# Patient Record
Sex: MALE | Race: WHITE | NOT HISPANIC OR LATINO | Employment: FULL TIME | URBAN - METROPOLITAN AREA
[De-identification: names, ages, dates, MRNs, and addresses within clinical notes are randomized per-mention and may not be internally consistent; named-entity substitution may affect disease eponyms.]

---

## 2018-04-27 ENCOUNTER — HOSPITAL ENCOUNTER (EMERGENCY)
Facility: HOSPITAL | Age: 53
Discharge: HOME | End: 2018-04-27
Attending: EMERGENCY MEDICINE
Payer: COMMERCIAL

## 2018-04-27 ENCOUNTER — APPOINTMENT (EMERGENCY)
Dept: RADIOLOGY | Facility: HOSPITAL | Age: 53
End: 2018-04-27
Payer: COMMERCIAL

## 2018-04-27 VITALS
RESPIRATION RATE: 16 BRPM | DIASTOLIC BLOOD PRESSURE: 96 MMHG | TEMPERATURE: 98.6 F | OXYGEN SATURATION: 99 % | WEIGHT: 226 LBS | SYSTOLIC BLOOD PRESSURE: 169 MMHG | BODY MASS INDEX: 30.65 KG/M2 | HEART RATE: 78 BPM

## 2018-04-27 DIAGNOSIS — M25.519 SHOULDER PAIN: Primary | ICD-10-CM

## 2018-04-27 DIAGNOSIS — S42.151A CLOSED FRACTURE OF GLENOID CAVITY AND NECK OF RIGHT SCAPULA, INITIAL ENCOUNTER: ICD-10-CM

## 2018-04-27 DIAGNOSIS — S42.141A CLOSED FRACTURE OF GLENOID CAVITY AND NECK OF RIGHT SCAPULA, INITIAL ENCOUNTER: ICD-10-CM

## 2018-04-27 PROCEDURE — 73030 X-RAY EXAM OF SHOULDER: CPT

## 2018-04-27 PROCEDURE — 99283 EMERGENCY DEPT VISIT LOW MDM: CPT

## 2018-04-27 RX ORDER — IBUPROFEN 400 MG/1
400 TABLET ORAL ONCE
Status: COMPLETED | OUTPATIENT
Start: 2018-04-27 | End: 2018-04-27

## 2018-04-27 RX ORDER — OXYCODONE HYDROCHLORIDE AND ACETAMINOPHEN 5; 325 MG/1; MG/1
1 TABLET ORAL EVERY 6 HOURS PRN
Qty: 15 TABLET | Refills: 0 | Status: SHIPPED | OUTPATIENT
Start: 2018-04-27 | End: 2018-05-02

## 2018-04-27 RX ORDER — IBUPROFEN 600 MG/1
600 TABLET ORAL EVERY 6 HOURS PRN
Qty: 28 TABLET | Refills: 0 | Status: SHIPPED | OUTPATIENT
Start: 2018-04-27 | End: 2019-11-15

## 2018-04-27 RX ORDER — OXYCODONE HYDROCHLORIDE AND ACETAMINOPHEN 5; 325 MG/1; MG/1
1 TABLET ORAL ONCE
Status: COMPLETED | OUTPATIENT
Start: 2018-04-27 | End: 2018-04-27

## 2018-04-27 RX ADMIN — IBUPROFEN 400 MG: 400 TABLET ORAL at 21:16

## 2018-04-27 RX ADMIN — OXYCODONE HYDROCHLORIDE AND ACETAMINOPHEN 1 TABLET: 5; 325 TABLET ORAL at 22:17

## 2018-04-28 NOTE — ED PROVIDER NOTES
History  Chief Complaint   Patient presents with    Shoulder Pain     pt c/o shoulder pain increasing over the past 2 weeks  has known torn rotator cuff for years  History provided by:  Patient   used: No    Shoulder Pain   Associated symptoms: no back pain and no fever      Patient is a 26-year-old male presenting to emergency department right shoulder pain  Chronic pain but worse since yesterday  Initially had bruising worsening pain 3 weeks ago  Bruising resolved  Yesterday was doing physical activity when pain got worse  No fevers  No swelling  No redness  No new falls  MDM x-ray concerning for age indeterminate glenoid fracture  Pain medications, sling, Ortho follow-up          None       Past Medical History:   Diagnosis Date    Fracture     right leg    Rotator cuff tear     bilateral       Past Surgical History:   Procedure Laterality Date    HIP ARTHROPLASTY Right     KNEE ARTHROSCOPY Bilateral     CT COLONOSCOPY FLX DX W/COLLJ SPEC WHEN PFRMD N/A 7/26/2016    Procedure: COLONOSCOPY;  Surgeon: Claudia Gross MD;  [facility]: [facility]; Service: Gastroenterology    CT WRIST Lori Stalling LIG Right 12/30/2016    Procedure: ENDOSCOPIC CARPAL TUNNEL RELEASE ;  Surgeon: Tracy Lorenzo MD;  Location: [facility];  Service: Orthopedics       History reviewed  No pertinent family history  I have reviewed and agree with the history as documented  Social History   Substance Use Topics    Smoking status: Never Smoker    Smokeless tobacco: Never Used    Alcohol use Yes      Comment: social        Review of Systems   Constitutional: Negative for chills, diaphoresis and fever  Respiratory: Negative for cough, shortness of breath, wheezing and stridor  Cardiovascular: Negative for chest pain, palpitations and leg swelling  Gastrointestinal: Negative for abdominal pain, blood in stool, diarrhea, nausea and vomiting     Genitourinary: Negative for dysuria, frequency and urgency  Musculoskeletal: Negative for back pain and neck stiffness  Skin: Negative for pallor and rash  Neurological: Negative for dizziness, syncope, weakness, light-headedness and headaches  All other systems reviewed and are negative  Physical Exam  ED Triage Vitals   Temperature Pulse Respirations Blood Pressure SpO2   04/27/18 2028 04/27/18 2026 04/27/18 2026 04/27/18 2026 04/27/18 2026   98 6 °F (37 °C) 78 16 169/96 99 %      Temp Source Heart Rate Source Patient Position - Orthostatic VS BP Location FiO2 (%)   04/27/18 2028 -- -- -- --   Oral          Pain Score       04/27/18 2026       8           Orthostatic Vital Signs  Vitals:    04/27/18 2026   BP: 169/96   Pulse: 78       Physical Exam   Constitutional: He appears well-developed and well-nourished  No distress  HENT:   Head: Normocephalic  Eyes: Pupils are equal, round, and reactive to light  Cardiovascular: Normal rate  Pulmonary/Chest: Effort normal    Musculoskeletal: He exhibits no tenderness or deformity  Decreased range of motion of the right shoulder due to pain, neurovascular intact distally, no tenderness, no deformity   Neurological: He is alert  No sensory deficit  He exhibits normal muscle tone  Coordination normal    Skin: He is not diaphoretic  ED Medications  Medications   ibuprofen (MOTRIN) tablet 400 mg (400 mg Oral Given 4/27/18 2116)   oxyCODONE-acetaminophen (PERCOCET) 5-325 mg per tablet 1 tablet (1 tablet Oral Given 4/27/18 2217)       Diagnostic Studies  Results Reviewed     None                 XR shoulder 2+ views RIGHT   Final Result by Merlinda Bank, DO (04/27 2158)      Lucency through the glenoid which may represent a age indeterminate fracture    Clinical correlation is recommended            Workstation performed: [id number]                    Procedures  Procedures       Phone Contacts  ED Phone Contact    ED Course                               ABI Means Time    Disposition  Final diagnoses:   Shoulder pain   Closed fracture of glenoid cavity and neck of right scapula, initial encounter     Time reflects when diagnosis was documented in both MDM as applicable and the Disposition within this note     Time User Action Codes Description Comment    4/27/2018 10:18 PM Benita Gant Add [M25 519] Shoulder pain     4/27/2018 10:18 PM Benita Gant Add [J16 168Q,  S42 151A] Closed fracture of glenoid cavity and neck of right scapula, initial encounter       ED Disposition     ED Disposition Condition Comment    Discharge  Denae Fitzgerald discharge to home/self care  Condition at discharge: Good        Follow-up Information     Follow up With Specialties Details Why Contact Info Additional [address] Emergency Department Emergency Medicine  As needed, If symptoms worsen Howie Mensah  [facility], [address], [facility] [place], [place], [address], DO Family Medicine  As needed [address] [facility] [place] [facility] Schedule an appointment as soon as possible for a visit  Edson [address] [place] [phone]  [phone]         Discharge Medication List as of 4/27/2018 10:20 PM      START taking these medications    Details   ibuprofen (MOTRIN) 600 mg tablet Take 1 tablet (600 mg total) by mouth every 6 (six) hours as needed for mild pain for up to 7 days, Starting Fri 4/27/2018, Until Fri 5/4/2018, Print      oxyCODONE-acetaminophen (PERCOCET) 5-325 mg per tablet Take 1 tablet by mouth every 6 (six) hours as needed for moderate pain for up to 5 days Max Daily Amount: 4 tablets, Starting Fri 4/27/2018, Until Wed 5/2/2018, Print           No discharge procedures on file      ED Provider  Electronically Signed by           Sony Hu MD  04/27/18 5463

## 2018-04-28 NOTE — DISCHARGE INSTRUCTIONS
Rotator Cuff Injury   WHAT YOU NEED TO KNOW:   What is a rotator cuff injury? A rotator cuff injury is damage to the muscles or tendons of your rotator cuff  A tendon is a cord of tough tissue that connects your muscles to your bones  The rotator cuff is made up of a group of muscles and tendons that hold the shoulder joint in place  The damage may include stretching of your muscle or tears in the tendons  It may also include inflammation of the bursa (small sack of fluid around the joint)  What causes a rotator cuff injury? · Wear and tear of the tendons: Your tendons get weaker as you get older  · Impingement: This happens when the bone of your upper arm presses on the tendon of your shoulder when you lift your arm  Impingement causes pain and inflammation that weaken your rotator cuff  · Overuse or injury:  Heavy lifting, throwing, or overuse may damage the rotator cuff  Sports such as baseball and tennis may injure your rotator cuff when your arm goes over your head  A fall or other shoulder injury may also damage your rotator cuff  What are the signs and symptoms of a rotator cuff injury? · Pain or stiffness: You may feel pain in your shoulder that travels down your arm  You may feel pain all of the time or only sometimes  You may feel pain when you lie on the side of your injured shoulder  · Decreased range of motion:  You may have trouble lifting your arm or placing it behind your back  It may also be hard to use your shoulder  If you have a severe injury, you may not be able to move your shoulder at all  · Tenderness or swelling: Your shoulder area may swell and be painful to the touch  · Numbness: You may lose feeling in part or all of your arm  This is more common in athletes who throw as part of their sport, such as baseball pitchers  · A popping noise: You may hear this noise and feel pain when you lift your arm  How is a rotator cuff injury diagnosed?   Your healthcare provider will ask if you have had an injury or surgery on your shoulder  He will examine your shoulder, and test the strength and movement of your arm  You may need other tests to show what is causing your symptoms  · Ultrasound: An ultrasound uses sound waves to show pictures on a monitor  An ultrasound may be done to show fluid or swelling around your rotator cuff  · X-ray:  An x-ray may show injury to the bones and tissues in your shoulder  · CT scan: This test is also called a CAT scan  An x-ray machine uses a computer to take pictures of your shoulder  You may be given a dye before the pictures are taken to help healthcare providers see the pictures better  Tell the healthcare provider if you have ever had an allergic reaction to contrast dye  · MRI:  This scan uses powerful magnets and a computer to take pictures of your shoulder  An MRI may show damage to your muscles or tendons  You may be given dye to help the pictures show up better  Tell the healthcare provider if you have ever had an allergic reaction to contrast dye  Do not enter the MRI room with any metal  Metal can cause serious injury  Tell the healthcare provider if you have any metal in or on your body  How is a rotator cuff injury treated? · Medicines:      ¨ Acetaminophen: This medicine decreases pain  Acetaminophen is available without a doctor's order  Ask how much to take and how often to take it  Follow directions  Acetaminophen can cause liver damage if not taken correctly  ¨ NSAIDs:  These medicines decrease swelling, pain, and fever  NSAIDs are available without a doctor's order  Ask your healthcare provider which medicine is right for you  Ask how much to take and when to take it  Take as directed  NSAIDs can cause stomach bleeding or kidney problems if not taken correctly  ¨ Pain medicine: You may be given a prescription medicine to decrease pain   Do not wait until the pain is severe before you take this medicine  ¨ Steroids: This medicine may be injected into the rotator cuff area to decrease inflammation and pain  · Physical therapy:  A physical therapist can teach you exercises to help improve movement and strength, and to decrease pain  These exercises may help you go back to your usual activities or return to playing sports  · Surgery: You may need surgery if your injury is severe or your symptoms do not improve  You may also need surgery to decrease your signs and symptoms if other treatments have not worked  ¨ Debridement and repair: This surgery is done for partial or full tears of your rotator cuff  Your healthcare provider will clean away damaged tissue and fix your tear  ¨ Tendon transfer and graft: This surgery is done for badly torn rotator cuffs that cannot be repaired easily  Your healthcare provider will use a piece of another tissue or muscle to fix the torn tendon  ¨ Arthrodesis: This surgery is to reshape the bone of your shoulder joint so it stays in place  This is done if the muscles of your rotator cuff are not working  ¨ Arthroplasty:  During this surgery, an artificial joint made of metal and plastic is put into your shoulder  This surgery may be done if the rotator cuff cannot be fixed, or if your pain and swelling do not go away  How can I care for my rotator cuff injury at home? · Rest:  Rest may help your shoulder heal  Overuse of your shoulder can make your injury worse  Avoid heavy lifting, using your arms over your head, or any other activity that makes the pain worse  · Put ice or heat on your shoulder:  Use ice on your shoulder every few hours for the first several days  This may help decrease pain and swelling  After the first several days, a heating pad may help relax the muscles in your shoulder  When should I contact my healthcare provider?    · The pain in your shoulder or arm is not improving, or is worse than before you started treatment  · You have new pain in your neck  · You have a fever  · You have questions or concerns about your condition or care  When should I seek immediate care or call 911? · You suddenly cannot move your arm  · You have severe stomach or back pain, are vomiting blood, or have black bowel movements  CARE AGREEMENT:   You have the right to help plan your care  Learn about your health condition and how it may be treated  Discuss treatment options with your caregivers to decide what care you want to receive  You always have the right to refuse treatment  The above information is an  only  It is not intended as medical advice for individual conditions or treatments  Talk to your doctor, nurse or pharmacist before following any medical regimen to see if it is safe and effective for you  © 2017 [employer]  Information is for End User's use only and may not be sold, redistributed or otherwise used for commercial purposes  All illustrations and images included in CareNotes® are the copyrighted property of A [employer]  or [employer]

## 2018-06-07 ENCOUNTER — OFFICE VISIT (OUTPATIENT)
Dept: OBGYN CLINIC | Facility: OTHER | Age: 53
End: 2018-06-07
Payer: COMMERCIAL

## 2018-06-07 VITALS
BODY MASS INDEX: 30.35 KG/M2 | SYSTOLIC BLOOD PRESSURE: 146 MMHG | WEIGHT: 223.8 LBS | DIASTOLIC BLOOD PRESSURE: 91 MMHG | HEART RATE: 71 BPM

## 2018-06-07 DIAGNOSIS — M25.511 ACUTE PAIN OF RIGHT SHOULDER: Primary | ICD-10-CM

## 2018-06-07 PROCEDURE — 99214 OFFICE O/P EST MOD 30 MIN: CPT | Performed by: ORTHOPAEDIC SURGERY

## 2018-06-07 NOTE — PROGRESS NOTES
Rehan Ngo MD personally examined the patient and reviewed the history provided  I agree with the note and the assessment and plan by Cesia Cleveland PA-C  Briefly the patient is a 47 y o  male with a chief complaint of right shoulder pain who presents to the office for evaluation and treatment  Please refer to the HPI documented by the PA in the body of the note for further details  I had cared for the patient years ago for a full-thickness rotator cuff tear and has been doing well until his recent falls and recurrent injury    Physical Exam: Blood pressure 146/91, pulse 71, weight 102 kg (223 lb 12 8 oz)  Right shoulder positive drop arm difficult with overhead lifting and weakness with abduction external rotation strength testing    Radiology: I have personally reviewed the following images and my interpretation follows  MRI right shoulder from 2010 shows a full-thickness supraspinatus tear    X-rays from April 2018 show no proximal migration and minimal degenerative change, questionable calcification of the posterior superior labrum    Assessment:    Right shoulder recurrent symptoms with known full-thickness supraspinatus tear    Plan:    Given the amount of time since the injury and the significant concern for progression of his rotator cuff tear and new MRI is indicated to evaluate for the structure of the shoulder and determine what options are available for treatment                  Assessment  Diagnoses and all orders for this visit:    Acute pain of right shoulder  -     MRI shoulder right wo contrast; Future        Discussion and Plan:    1  MRI right shoulder to evaluate rotator cuff, evaluate for loose bodies and to better define the x-ray suspected glenoid fracture  2  Tylenol as needed  3  Follow up after MRI to discuss treatment options  Ed getting  in September, so if there is something surgical then he would like to wait until after the wedding      Subjective: Patient ID: Elena Liang is a 47 y o  male      Ed returns to the office with worsening right shoulder pain  He has a past history positive for a right shoulder rotator cuff tear - 8-9 years ago  He admits to 2 significant falls since he was seen in 2010  His pain recently worsened 2 months ago when painting for extended period of time overhead-Arm got stuck and he had to forcibly move the arm  He noticed significant bruising the next day  A few days later he was carrying a box when the pain in the right shoulder worsened to the point where he had to go to the ER  ER took x-rays and told him he possibly had a glenoid fracture and to follow up with [facility] (this was in April)  Since the ER visit, he has noted an overall improvement in the right shoulder pain but has difficulty elevating the right shoulder over head  He denies paresthesias  The following portions of the patient's history were reviewed and updated as appropriate: allergies, current medications, past family history, past medical history, past social history, past surgical history and problem list     Review of Systems   Constitutional: Negative for chills and fever  HENT: Negative for hearing loss  Eyes: Negative for visual disturbance  Respiratory: Negative for shortness of breath  Cardiovascular: Negative for chest pain  Gastrointestinal: Negative for abdominal pain  Musculoskeletal:        As reviewed in the HPI   Skin: Negative for rash  Neurological:        As reviewed in the HPI   Psychiatric/Behavioral: Negative for agitation  Objective:  Right Shoulder Exam     Tenderness   The patient is experiencing tenderness in the trapezius      Range of Motion   Active Abduction:                       130  Forward Flexion:                        170  External Rotation:                      60  Internal Rotation 0 degrees:      Lumbar    Muscle Strength   External Rotation: 5/5  Supraspinatus:     4/5    Tests Impingement:   Negative  Rubio:          Positive  Cross Arm:      Negative  Drop Arm:        Positive    Comments:  +empty Can  +O'Briens  Neg speeds          Physical Exam   Constitutional: He is oriented to person, place, and time  He appears well-developed and well-nourished  HENT:   Head: Normocephalic  Eyes: EOM are normal    Neck: Normal range of motion  Pulmonary/Chest: No respiratory distress  He has no wheezes  Neurological: He is alert and oriented to person, place, and time  Skin: Skin is warm and dry  Psychiatric: He has a normal mood and affect  His behavior is normal  Judgment and thought content normal          I have personally reviewed pertinent films in PACS and my interpretation is as follows      3 views right shoulder: No dislocation; possible glenoid fracture as mentioned by radiologist

## 2018-06-14 ENCOUNTER — HOSPITAL ENCOUNTER (OUTPATIENT)
Dept: RADIOLOGY | Age: 53
Discharge: HOME | End: 2018-06-14
Payer: COMMERCIAL

## 2018-06-14 DIAGNOSIS — M25.511 ACUTE PAIN OF RIGHT SHOULDER: ICD-10-CM

## 2018-06-14 PROCEDURE — 73221 MRI JOINT UPR EXTREM W/O DYE: CPT

## 2018-07-03 ENCOUNTER — OFFICE VISIT (OUTPATIENT)
Dept: OBGYN CLINIC | Facility: OTHER | Age: 53
End: 2018-07-03
Payer: COMMERCIAL

## 2018-07-03 VITALS
SYSTOLIC BLOOD PRESSURE: 136 MMHG | BODY MASS INDEX: 30.22 KG/M2 | DIASTOLIC BLOOD PRESSURE: 90 MMHG | HEART RATE: 83 BPM | WEIGHT: 222.8 LBS

## 2018-07-03 DIAGNOSIS — IMO0001 SUPRASPINATUS TENDON TEAR, RIGHT, SUBSEQUENT ENCOUNTER: Primary | ICD-10-CM

## 2018-07-03 PROCEDURE — 99214 OFFICE O/P EST MOD 30 MIN: CPT | Performed by: ORTHOPAEDIC SURGERY

## 2018-07-03 PROCEDURE — 20610 DRAIN/INJ JOINT/BURSA W/O US: CPT | Performed by: ORTHOPAEDIC SURGERY

## 2018-07-03 RX ORDER — BUPIVACAINE HYDROCHLORIDE 2.5 MG/ML
2 INJECTION, SOLUTION INFILTRATION; PERINEURAL
Status: COMPLETED | OUTPATIENT
Start: 2018-07-03 | End: 2018-07-03

## 2018-07-03 RX ORDER — BETAMETHASONE SODIUM PHOSPHATE AND BETAMETHASONE ACETATE 3; 3 MG/ML; MG/ML
6 INJECTION, SUSPENSION INTRA-ARTICULAR; INTRALESIONAL; INTRAMUSCULAR; SOFT TISSUE
Status: COMPLETED | OUTPATIENT
Start: 2018-07-03 | End: 2018-07-03

## 2018-07-03 RX ADMIN — BUPIVACAINE HYDROCHLORIDE 2 ML: 2.5 INJECTION, SOLUTION INFILTRATION; PERINEURAL at 15:52

## 2018-07-03 RX ADMIN — BETAMETHASONE SODIUM PHOSPHATE AND BETAMETHASONE ACETATE 6 MG: 3; 3 INJECTION, SUSPENSION INTRA-ARTICULAR; INTRALESIONAL; INTRAMUSCULAR; SOFT TISSUE at 15:52

## 2018-07-03 NOTE — PROGRESS NOTES
Assessment  Diagnoses and all orders for this visit:    Supraspinatus tendon tear, right, subsequent encounter        Discussion and Plan:    The patient has progressed his tear to a large retracted supraspinatus tear with mild to moderate atrophy  Operative options including attempted repair with possible superior capsular reconstruction were reviewed and the patient is interested in this however he wishes to delay this until after his wedding and does not feel he can have surgery to November so we have provided him with a subacromial injection for symptomatic treatment and he will return to his home exercises  We will check his progress in October to possibly consider arthroscopic evaluation and either repair or reconstruction in November    Subjective:   Patient ID: Talon Farooq is a 47 y o  male      Patient presents for review of his right shoulder MRI scan  Had a known full-thickness tear 8 years ago which was treated successfully nonoperatively, he has had recurrent symptoms and cannot raise his arm overhead            The following portions of the patient's history were reviewed and updated as appropriate: allergies, current medications, past family history, past medical history, past social history, past surgical history and problem list     Review of Systems   Constitutional: Negative for chills and fever  HENT: Negative for hearing loss  Eyes: Negative for visual disturbance  Respiratory: Negative for shortness of breath  Cardiovascular: Negative for chest pain  Gastrointestinal: Negative for abdominal pain  Musculoskeletal:        As reviewed in the HPI   Skin: Negative for rash  Neurological:        As reviewed in the HPI   Psychiatric/Behavioral: Negative for agitation         Objective:  Right Shoulder Exam     Tenderness   None    Range of Motion   Active Abduction:                       100  Passive Abduction:                    100  Extension: Normal  Forward Flexion:                        120  External Rotation:                      30    Muscle Strength   Abduction:            4/5  Internal Rotation:  4/5  External Rotation: 4/5    Tests   Impingement:   Positive  Rubio:          Positive  Cross Arm:      Negative  Drop Arm:        Positive  Apprehension: Negative          Physical Exam   Constitutional: He is oriented to person, place, and time  He appears well-developed and well-nourished  HENT:   Head: Normocephalic and atraumatic  Neck: Normal range of motion  Neck supple  Cardiovascular: Normal rate and regular rhythm  Pulmonary/Chest: Effort normal  No respiratory distress  Abdominal: Soft  He exhibits no distension  Neurological: He is alert and oriented to person, place, and time  Skin: Skin is warm and dry  Psychiatric: He has a normal mood and affect  His behavior is normal    Nursing note and vitals reviewed  I have personally reviewed pertinent films in PACS and my interpretation is as follows      MRI right shoulder shows progression of his supraspinatus tear to a full-thickness retracted tear, the tendon is retracted to the glenoid and there is mild to moderate muscle atrophy with cystic changes of the humeral head    Plain x-rays right shoulder from April 2018 show no proximal humeral migration      Large joint arthrocentesis  Date/Time: 7/3/2018 3:52 PM  Consent given by: patient  Timeout: Immediately prior to procedure a time out was called to verify the correct patient, procedure, equipment, support staff and site/side marked as required   Supporting Documentation  Indications: pain   Procedure Details  Location: shoulder - R subacromial bursa  Preparation: Patient was prepped and draped in the usual sterile fashion  Needle size: 22 G  Ultrasound guidance: no  Approach: lateral  Medications administered: 2 mL bupivacaine 0 25 %; 6 mg betamethasone acetate-betamethasone sodium phosphate 6 (3-3) mg/mL    Patient tolerance: patient tolerated the procedure well with no immediate complications  Dressing:  Sterile dressing applied

## 2018-07-03 NOTE — PATIENT INSTRUCTIONS

## 2018-10-04 ENCOUNTER — OFFICE VISIT (OUTPATIENT)
Dept: OBGYN CLINIC | Facility: OTHER | Age: 53
End: 2018-10-04
Payer: COMMERCIAL

## 2018-10-04 VITALS
WEIGHT: 217.4 LBS | DIASTOLIC BLOOD PRESSURE: 91 MMHG | SYSTOLIC BLOOD PRESSURE: 132 MMHG | BODY MASS INDEX: 29.48 KG/M2 | HEART RATE: 76 BPM

## 2018-10-04 DIAGNOSIS — IMO0001 SUPRASPINATUS TENDON TEAR, RIGHT, SUBSEQUENT ENCOUNTER: Primary | ICD-10-CM

## 2018-10-04 PROCEDURE — 99214 OFFICE O/P EST MOD 30 MIN: CPT | Performed by: ORTHOPAEDIC SURGERY

## 2018-10-04 NOTE — PROGRESS NOTES
Assessment  Diagnoses and all orders for this visit:    Supraspinatus tendon tear, right, subsequent encounter      Discussion and Plan:    Plan for OR with Dr Adonis Suresh for Right Shoulder rotator cuff repair  Physical therapy and a sling will be arranged  Follow up after surgery    Subjective:   Patient ID: Kerry Woo is a 47 y o  male      Ed presents to the office with chief complaint of right shoulder pain  Right shoulder pain is worse with motion and lifting  He states the shoulder feels better since the injection in July  He denies new injury or trauma  He currently does not have pain that interferes with sleep  He is here to discuss surgical options for his massive rotator cuff tear  No numbness or tingling  The following portions of the patient's history were reviewed and updated as appropriate: allergies, current medications, past family history, past medical history, past social history, past surgical history and problem list     Review of Systems   Constitutional: Negative for chills and fever  HENT: Negative for hearing loss  Eyes: Negative for visual disturbance  Respiratory: Negative for shortness of breath  Cardiovascular: Negative for chest pain  Gastrointestinal: Negative for abdominal pain  Musculoskeletal:        As reviewed in the HPI   Skin: Negative for rash  Neurological:        As reviewed in the HPI   Psychiatric/Behavioral: Negative for agitation  Objective:  Right Shoulder Exam     Range of Motion   Normal right shoulder ROM    Muscle Strength   External Rotation: 4/5  Supraspinatus:     4/5    Tests   Impingement:   Positive  Rubio:          Positive  Drop Arm:        Positive    Comments:    Positive empty can  Positive O'Briens          Physical Exam   Constitutional: He is oriented to person, place, and time  He appears well-developed and well-nourished  HENT:   Head: Normocephalic  Eyes: EOM are normal    Neck: Normal range of motion  Pulmonary/Chest: No respiratory distress  He has no wheezes  Neurological: He is alert and oriented to person, place, and time  Skin: Skin is warm and dry  Psychiatric: He has a normal mood and affect  His behavior is normal  Judgment and thought content normal        I Etelvina Garcia MD personally examined the patient and reviewed the history provided  I agree with the note and the assessment and plan by Ros Mensah PA-C  Briefly the patient is a 47 y o  male who presents to the office for evaluation and treatment  Please refer to the HPI documented by the PA in the body of the note for further details  Patient has a known full-thickness supraspinatus tear which is somewhat chronic in nature    Physical Exam: Blood pressure 132/91, pulse 76, weight 98 6 kg (217 lb 6 4 oz)  Right shoulder full range of motion with pain and weakness abduction testing    Radiology: I have personally reviewed the following images and my interpretation follows  MRI right shoulder shows a full-thickness complete supraspinatus tear with retraction the glenohumeral joint  Only mild muscle atrophy is seen    Assessment:    Right full-thickness supraspinatus tear    Plan:    I again reviewed with the patient that this is a chronic tear which should be attempted to be repaired to help preserve his function in the shoulder going forward and he does wish to proceed forward  He is aware that we may not be able to fully repair the tear if it is truly chronic but certainly an attempt should be made and he is interested in proceeding forward with scheduling  A thorough discussion was performed with the patient regarding the risks and benefit of operative and nonoperative treatment of their rotator cuff tear    Risks discussed include but were not limited to infection, neurovascular injury, recurrent tear, nonhealing of the repair, need for further surgery, need for biceps tenodesis or tenotomy, stiffness, need for prolonged rehabilitation, as well as the risk of anesthesia  After this discussion all questions were answered and informed consent was obtained in the office for arthroscopic rotator cuff repair of the right shoulder

## 2018-10-04 NOTE — PATIENT INSTRUCTIONS
You are being scheduled for a shoulder arthroscopy to treat your symptoms  Below are some instructions and information on what to expect before and after your surgery  Pre-Surgical Preparation for Arthroscopic Shoulder Surgery: You will be contacted the evening prior to your surgery to confirm the scheduled time of the procedure and when to arrive at the hospital    Do not eat or drink anything after midnight the night before your surgery  Since you are having out-patient surgery, make sure that you have someone who can drive you home later in the day  Also, prepare that person for a long day, as the process of safely preparing for and recovering from the procedure is more time consuming than the actual procedure! As you will be in a sling after surgery, please wear or bring a loose fitting button-down shirt so that you can easily place this over the sling when you leave the surgical suite  This avoids having to place the operative arm in a sleeve  Most patients find that this is the easiest outfit to wear for the first week or so after surgery so you may want to plan accordingly  Most patients find that lying down in bed after shoulder surgery accentuates their discomfort  This is likely related to the effect of gravity on the swelling in the shoulder  As a result, most patients sleep better in a recliner or in bed with pillows propped up behind their back for the first few days or weeks after surgery  It is a good idea to plan for this ahead of time so there will be less hassle getting things set up the night after surgery  What to Expect After Arthroscopic Shoulder Surgery: It is normal to have swelling and discomfort in the shoulder for several days or a week after surgery  It is also normal to have a small amount of drainage from the surgical wounds (especially the first few days after surgery), as we put fluid into the shoulder to visualize the structures during surgery  It is NOT normal to have foul smelling, purulent drainage and if this is noted, please contact the office immediately or proceed to the emergency room for evaluation as this may indicate an infection  Applying ice bags to the shoulder may help with pain that is not controlled by the pain medicine  Ice should be applied 20-30 minutes at a time, every hour or two  Make sure to put a thin towel or T-shirt next to your skin to avoid direct contact of the ice with the skin  Icing is most helpful in the first 48 hours, although many people find that continuing past this time frame lessens their postoperative pain  Please note that your post-operative dressing is not conductive to ice, so if you need to, it is okay to remove that dressing even the night after surgery and place band-aids over the wounds in order for the ice to take effect  Pain Control    Most patients will receive a nerve block, the local anesthetic may keep your whole arm numb for several hours (12-24 in most cases)  When the block is beginning to wear off, you will first feel a pins and needles sensation in your hand  This is a warning that you may start experiencing more pain, so please take a pain pill if you have not already  You will be given a prescription for pain medication when you are discharged from the hospital  If you find you do not tolerate that type of pain medicine well, call our office and we will try another one  The anesthesiologists have also been providing most patients with a catheter that is left in place after the block  The catheter is connected to a small pump which will continue to provide numbing medicine and help prolong the pain control from the block  Unfortunately this catheter is not as effective as the initial block, but can still be very helpful in managing the pain  Even with the block the pain can be significant and a narcotic pain medication is often required to manage the pain    A prescription for this will be provided but only a limited number of pills will be prescribed to help manage the acute phase of recovery  Outside of the acute phase (5 or so days), this medication will not be indicated  In addition to the narcotic pain medication, it is safe to use an anti-inflammatory (unless the patient has a medical condition that would not allow safe use of this mediation)  This includes the Advil, Motrin, Ibuprofen and Alleve category of medications  Simply follow the over the counter dosing on the package and use as indicated as another adjunct  Importantly since these medications are all very similar, use only one of them  Tylenol is a separate medication that can be utilized as well and can be taken at the same time as the other medication or given in a "staggered" manner  Just make sure that you follow the dosing on the over the counter bottle instructions  Also make sure that the pain medication prescribed by Dr Lisa Soto team does not contain acetaminophen (this is found in Percocet and Vicodin)  Typically we do not prescribe those types of pain medications but if for some reason that has been prescribed DO NOT add more Tylenol (acetaminophen) as you could end up taking too much of that medication  As mentioned above, most patients find that lying down accentuates their discomfort  You might sleep better in a recliner, or propped up in bed  Dr Neema Lam encourages patients to safely ambulate around the house as much as possible in the first few days after the procedure as this can help with blood circulation in the legs  While the incidence of blood clots is very rare following shoulder surgery, early ambulation is a great way to help decrease the already low rate  24-48 hours after the surgery you may remove the bandage and cover incisions with Band-Aids if needed   At that time you may shower, the wounds will have a surgical glue that will protect them from shower water but do not submerge your incisions directly (bathing or swimming) until at least 2 weeks post-operatively  Unless noted otherwise in your discharge paperwork, Dr Gamaliel Orourke uses absorbable sutures so they do not need to be removed  Dr Cheko Xie physician assistant (PA) will see you in the office a few days after the procedure to review the intra-operative findings and to initiate physical therapy if appropriate  A post-operative appointment should have been scheduled for you already, but if for some reason this did not happen, please call the office to make one  Physical therapy is important after nearly all shoulder surgeries and a detailed rehabilitation plan based on the specific intra-operative findings and procedures will be provided to your therapist at the first post-operative office visit  Most patients have post-operative therapy appointments scheduled pre-operatively, but if you do not, that will be handled at the first post-operative office visit  Unless expressly directed otherwise it is safe to remove the sling even the first day after the surgery and let the arm hang by the side  This allows patients to shower and even put a shirt on (bad arm in the sleeve first)  It is also safe to flex and extend their wrist, hand and fingers as much as possible when the block wears off  These simple motions can serve to pump fluid out of the forearm and decrease swelling in the arm

## 2018-10-18 NOTE — PRE-PROCEDURE INSTRUCTIONS
Pre-Surgery Instructions:   Medication Instructions    ibuprofen (MOTRIN) 600 mg tablet Patient was instructed by Physician and understands  Pre op and bathing instructions reviewed  Pt will get hibiclens    Pt will bring Sling DOS

## 2018-10-19 ENCOUNTER — ANESTHESIA EVENT (OUTPATIENT)
Dept: PERIOP | Facility: AMBULARY SURGERY CENTER | Age: 53
End: 2018-10-19
Payer: COMMERCIAL

## 2018-11-02 ENCOUNTER — ANESTHESIA (OUTPATIENT)
Dept: PERIOP | Facility: AMBULARY SURGERY CENTER | Age: 53
End: 2018-11-02
Payer: COMMERCIAL

## 2018-11-02 ENCOUNTER — HOSPITAL ENCOUNTER (OUTPATIENT)
Facility: AMBULARY SURGERY CENTER | Age: 53
Setting detail: OUTPATIENT SURGERY
Discharge: HOME | End: 2018-11-02
Attending: ORTHOPAEDIC SURGERY | Admitting: ORTHOPAEDIC SURGERY
Payer: COMMERCIAL

## 2018-11-02 VITALS
HEART RATE: 78 BPM | OXYGEN SATURATION: 98 % | HEIGHT: 72 IN | BODY MASS INDEX: 29.8 KG/M2 | DIASTOLIC BLOOD PRESSURE: 66 MMHG | RESPIRATION RATE: 18 BRPM | WEIGHT: 220 LBS | SYSTOLIC BLOOD PRESSURE: 122 MMHG | TEMPERATURE: 97.5 F

## 2018-11-02 DIAGNOSIS — IMO0001 SUPRASPINATUS TENDON TEAR, RIGHT, SUBSEQUENT ENCOUNTER: Primary | ICD-10-CM

## 2018-11-02 PROCEDURE — 29827 SHO ARTHRS SRG RT8TR CUF RPR: CPT | Performed by: PHYSICIAN ASSISTANT

## 2018-11-02 PROCEDURE — C1713 ANCHOR/SCREW BN/BN,TIS/BN: HCPCS | Performed by: ORTHOPAEDIC SURGERY

## 2018-11-02 PROCEDURE — C9290 INJ, BUPIVACAINE LIPOSOME: HCPCS | Performed by: STUDENT IN AN ORGANIZED HEALTH CARE EDUCATION/TRAINING PROGRAM

## 2018-11-02 PROCEDURE — 29823 SHO ARTHRS SRG XTNSV DBRDMT: CPT | Performed by: PHYSICIAN ASSISTANT

## 2018-11-02 PROCEDURE — 29827 SHO ARTHRS SRG RT8TR CUF RPR: CPT | Performed by: ORTHOPAEDIC SURGERY

## 2018-11-02 PROCEDURE — 29823 SHO ARTHRS SRG XTNSV DBRDMT: CPT | Performed by: ORTHOPAEDIC SURGERY

## 2018-11-02 DEVICE — HEALIX ADVANCE KNOTLESS BR ANCHOR TCP/PLGA ABSORBABLE ANCHOR 5.5MM
Type: IMPLANTABLE DEVICE | Site: SHOULDER | Status: FUNCTIONAL
Brand: HEALIX ADVANCE

## 2018-11-02 DEVICE — DEPUY MITEK HEALIX ADVANCE 4.5 BR: Type: IMPLANTABLE DEVICE | Site: SHOULDER | Status: FUNCTIONAL

## 2018-11-02 RX ORDER — PROPOFOL 10 MG/ML
INJECTION, EMULSION INTRAVENOUS AS NEEDED
Status: DISCONTINUED | OUTPATIENT
Start: 2018-11-02 | End: 2018-11-02 | Stop reason: SURG

## 2018-11-02 RX ORDER — BUPIVACAINE HYDROCHLORIDE 5 MG/ML
INJECTION, SOLUTION PERINEURAL AS NEEDED
Status: DISCONTINUED | OUTPATIENT
Start: 2018-11-02 | End: 2018-11-02 | Stop reason: SURG

## 2018-11-02 RX ORDER — OXYCODONE HYDROCHLORIDE 5 MG/1
10 TABLET ORAL EVERY 4 HOURS PRN
Status: DISCONTINUED | OUTPATIENT
Start: 2018-11-02 | End: 2018-11-02 | Stop reason: HOSPADM

## 2018-11-02 RX ORDER — ACETAMINOPHEN 325 MG/1
650 TABLET ORAL EVERY 6 HOURS PRN
Status: DISCONTINUED | OUTPATIENT
Start: 2018-11-02 | End: 2018-11-02 | Stop reason: HOSPADM

## 2018-11-02 RX ORDER — ONDANSETRON 2 MG/ML
4 INJECTION INTRAMUSCULAR; INTRAVENOUS ONCE AS NEEDED
Status: DISCONTINUED | OUTPATIENT
Start: 2018-11-02 | End: 2018-11-02 | Stop reason: HOSPADM

## 2018-11-02 RX ORDER — HYDROMORPHONE HCL/PF 1 MG/ML
0.5 SYRINGE (ML) INJECTION
Status: DISCONTINUED | OUTPATIENT
Start: 2018-11-02 | End: 2018-11-02 | Stop reason: HOSPADM

## 2018-11-02 RX ORDER — OXYCODONE HYDROCHLORIDE 5 MG/1
TABLET ORAL
Qty: 26 TABLET | Refills: 0 | Status: SHIPPED | OUTPATIENT
Start: 2018-11-02 | End: 2019-11-15

## 2018-11-02 RX ORDER — ONDANSETRON 2 MG/ML
4 INJECTION INTRAMUSCULAR; INTRAVENOUS EVERY 6 HOURS PRN
Status: DISCONTINUED | OUTPATIENT
Start: 2018-11-02 | End: 2018-11-02 | Stop reason: HOSPADM

## 2018-11-02 RX ORDER — ACETAMINOPHEN 500 MG
1000 TABLET ORAL EVERY 6 HOURS PRN
COMMUNITY
End: 2019-11-15

## 2018-11-02 RX ORDER — OXYCODONE HYDROCHLORIDE 5 MG/1
5 TABLET ORAL EVERY 4 HOURS PRN
Status: DISCONTINUED | OUTPATIENT
Start: 2018-11-02 | End: 2018-11-02 | Stop reason: HOSPADM

## 2018-11-02 RX ORDER — MIDAZOLAM HYDROCHLORIDE 1 MG/ML
INJECTION INTRAMUSCULAR; INTRAVENOUS AS NEEDED
Status: DISCONTINUED | OUTPATIENT
Start: 2018-11-02 | End: 2018-11-02 | Stop reason: SURG

## 2018-11-02 RX ORDER — FENTANYL CITRATE/PF 50 MCG/ML
25 SYRINGE (ML) INJECTION
Status: DISCONTINUED | OUTPATIENT
Start: 2018-11-02 | End: 2018-11-02 | Stop reason: HOSPADM

## 2018-11-02 RX ORDER — ONDANSETRON 2 MG/ML
INJECTION INTRAMUSCULAR; INTRAVENOUS AS NEEDED
Status: DISCONTINUED | OUTPATIENT
Start: 2018-11-02 | End: 2018-11-02 | Stop reason: SURG

## 2018-11-02 RX ORDER — FENTANYL CITRATE 50 UG/ML
INJECTION, SOLUTION INTRAMUSCULAR; INTRAVENOUS AS NEEDED
Status: DISCONTINUED | OUTPATIENT
Start: 2018-11-02 | End: 2018-11-02 | Stop reason: SURG

## 2018-11-02 RX ORDER — SODIUM CHLORIDE 9 MG/ML
125 INJECTION, SOLUTION INTRAVENOUS CONTINUOUS
Status: DISCONTINUED | OUTPATIENT
Start: 2018-11-02 | End: 2018-11-02 | Stop reason: HOSPADM

## 2018-11-02 RX ORDER — LIDOCAINE HYDROCHLORIDE 10 MG/ML
INJECTION, SOLUTION INFILTRATION; PERINEURAL AS NEEDED
Status: DISCONTINUED | OUTPATIENT
Start: 2018-11-02 | End: 2018-11-02 | Stop reason: SURG

## 2018-11-02 RX ADMIN — SODIUM CHLORIDE: 0.9 INJECTION, SOLUTION INTRAVENOUS at 11:24

## 2018-11-02 RX ADMIN — CEFAZOLIN SODIUM 2000 MG: 2 SOLUTION INTRAVENOUS at 11:24

## 2018-11-02 RX ADMIN — ONDANSETRON 4 MG: 2 INJECTION INTRAMUSCULAR; INTRAVENOUS at 12:40

## 2018-11-02 RX ADMIN — DEXAMETHASONE SODIUM PHOSPHATE 5 MG: 10 INJECTION INTRAMUSCULAR; INTRAVENOUS at 11:37

## 2018-11-02 RX ADMIN — BUPIVACAINE 20 ML: 13.3 INJECTION, SUSPENSION, LIPOSOMAL INFILTRATION at 11:00

## 2018-11-02 RX ADMIN — BUPIVACAINE HYDROCHLORIDE 5 ML: 5 INJECTION, SOLUTION PERINEURAL at 11:00

## 2018-11-02 RX ADMIN — FENTANYL CITRATE 50 MCG: 50 INJECTION, SOLUTION INTRAMUSCULAR; INTRAVENOUS at 10:55

## 2018-11-02 RX ADMIN — LIDOCAINE HYDROCHLORIDE 50 MG: 10 INJECTION, SOLUTION INFILTRATION; PERINEURAL at 11:28

## 2018-11-02 RX ADMIN — MIDAZOLAM HYDROCHLORIDE 2 MG: 1 INJECTION, SOLUTION INTRAMUSCULAR; INTRAVENOUS at 10:55

## 2018-11-02 RX ADMIN — PROPOFOL 200 MG: 10 INJECTION, EMULSION INTRAVENOUS at 11:28

## 2018-11-02 NOTE — ANESTHESIA POSTPROCEDURE EVALUATION
Post-Op Assessment Note      CV Status:  Stable    Mental Status:  Alert and awake    Hydration Status:  Euvolemic    PONV Controlled:  Controlled    Airway Patency:  Patent    Post Op Vitals Reviewed: Yes          Staff: Anesthesiologist, CRNA           BP   130/81   Temp   97   Pulse  71   Resp   16   SpO2   98

## 2018-11-02 NOTE — ANESTHESIA PROCEDURE NOTES
Peripheral Block    Patient location during procedure: holding area  Start time: 11/2/2018 11:00 AM  Reason for block: at surgeon's request and post-op pain management  Staffing  Anesthesiologist: Jillian Arrieta  Performed: anesthesiologist   Preanesthetic Checklist  Completed: patient identified, site marked, surgical consent, pre-op evaluation, timeout performed, IV checked, risks and benefits discussed and monitors and equipment checked  Peripheral Block  Patient position: sitting  Prep: ChloraPrep  Patient monitoring: heart rate, continuous pulse ox, frequent blood pressure checks and cardiac monitor  Block type: interscalene  Laterality: right  Injection technique: single-shot  Procedures: ultrasound guided  Ultrasound permanent image saved  Local infiltration: lidocaine  Infiltration strength: 1 %  Dose: 1 5 mL  Needle  Needle type: Stimuplex   Needle gauge: 22 G  Needle length: 5 cm  Needle localization: ultrasound guidance  Assessment  Injection assessment: local visualized surrounding nerve on ultrasound, incremental injection, negative aspiration for heme and no paresthesia on injection  Paresthesia pain: none  Heart rate change: no  Slow fractionated injection: yes  Post-procedure:  site cleaned  patient tolerated the procedure well with no immediate complications

## 2018-11-02 NOTE — H&P
I identified and marked the patient in the pre-op holding area after confirming the surgical consent  No changes to medical health since the H&P was preformed  The patient's prescription history was queried in the [facility] to ensure compliance with applicable state laws

## 2018-11-02 NOTE — OP NOTE
OPERATIVE REPORT  PATIENT NAME: Keisha Fitzgerald    :  1965  MRN: [id number]  Pt Location: AN  OR ROOM 06    SURGERY DATE: 2018     SURGEON: Kiersten Victoria MD     ASSISTANT: Steph Cazares PA-C     NOTE: Steph Cazares PA-C was present throughout the entire procedure and performed essential assistance with patient prepping, draping, positioning, suture management, wound closure, sterile dressing application and sling application, all under my direct supervision  NOTE: No qualified resident physician was available for assistance    PREOPERATIVE DIAGNOSIS: Right Shoulder Large Retracted Rotator Cuff Tear    POSTOPERATIVE DIAGNOSIS: Same    PROCEDURES: Surgical Arthroscopy Right Shoulder with Rotator Cuff Repair, Removal Of Loose Body and Extensive Debridement    ANESTHESIA STAFF: Jennifer Morales MD     ANESTHESIA TYPE: General LMA , with interscalene block (Exparel)    COMPLICATIONS: None    FINDINGS:  Large retracted supraspinatus delaminated tear with intra-articular loose bodies and mild to moderate glenohumeral degenerative change    SPECIMEN(S):None    ESTIMATED BLOOD LOSS: Minimal    INDICATION:  Briefly, the patient is a 47 y o   male with right shoulder pain  MRI scan confirmed a retracted supraspinatus tear which had advanced in retraction in size since his initial treatment nonoperatively years ago  The patient elected for arthroscopic treatment  Informed consent was obtained after a thorough discussion of the risks and benefits of the procedure, as well as alternatives to the procedure  OPERATIVE TECHNIQUE:  On the day of surgery, I identified the patients right shoulder and marked it with my initials  The patient was taken to the operating room where anesthesia was induced and 2 grams of IV Cefazolin were given  The patient was examined in the supine position and was found to have full range of motion with crepitation of the right shoulder with no instability    The patient was then positioned in the [address] [place] [address] chair position  All bony prominences were padded  The shoulder was prepped and draped in normal sterile fashion  After a time-out for safety, a standard posterolateral arthroscopic portal was made  Glenohumeral evaluation revealed early degenerative changes humeral head and glenoid with multiple loose bodies greater than 5 mm in size  A shaving device was introduced and a extensive debridement of the degenerative changes of the humeral head and glenoid was performed as well as removal of the multiple intra-articular loose bodies through the anterior cannula  There was a large retracted and delaminated complete supraspinatus and anterior infraspinatus tear with a pre-existing long-head biceps tendon rupture  Subscapularis was intact  The glenoid labrum had extensive degenerative change and this was debrided to a stable border as was the long-head biceps tendon stump  After the intra-articular work was completed, the scope was then placed in the subacromial space through a posterolateral portal where a thorough bursectomy was performed  The tuberosity was prepared in routine fashion and a single row repair with two medial 4 5 mm Healix Advanced anchors was performed achieving anatomical reduction of the rotator cuff tendon to the tuberosity  Care was taken during the passage of stitches to grab both the bursal and articular sided delamination in order to perform a complete repair, this was able to be performed with restoration of the insertion of the 2 lamina of the supraspinatus to the tuberosity  A CA ligament release and acromioplasty was not performed given the concern with this large delaminated tear that we may not have complete healing and I did not wish to predispose the patient anterior superior escape with release of the CA ligament  The area was then irrigated  Scope was withdrawn  Wounds were closed with 4-0 Monocryl and Histoacryl   Sterile dressings and a sling with an abduction pillow was placed  The patient was awoken without complication and returned to the recovery room in good condition  We will see the patient back in the office next week to initiate therapy following standard rotator cuff repair rehabilitation protocol  At the end of procedure, the counts were correct       PATIENT DISPOSITION:  Stable to PACU    SIGNATURE: Joselito Underwood MD  DATE: November 2, 2018  TIME: 12:45 PM

## 2018-11-02 NOTE — ANESTHESIA PREPROCEDURE EVALUATION
Review of Systems/Medical History  Patient summary reviewed  Chart reviewed  No history of anesthetic complications     Cardiovascular  Hyperlipidemia,    Pulmonary  Negative pulmonary ROS   Comment: Snores, no apneas     GI/Hepatic  Negative GI/hepatic ROS          Negative  ROS        Endo/Other  Negative endo/other ROS      GYN       Hematology  Negative hematology ROS      Musculoskeletal  Negative musculoskeletal ROS        Neurology  Negative neurology ROS      Psychology   Negative psychology ROS              Physical Exam    Airway    Mallampati score: I  TM Distance: >3 FB  Neck ROM: full     Dental   No notable dental hx     Cardiovascular      Pulmonary      Other Findings       No recent labs    Anesthesia Plan  ASA Score- 1     Anesthesia Type- general and regional with ASA Monitors  Additional Monitors:   Airway Plan: LMA  Plan Factors-    Induction- intravenous  Postoperative Plan-     Informed Consent- Anesthetic plan and risks discussed with patient and spouse  I personally reviewed this patient with the CRNA  Discussed and agreed on the Anesthesia Plan with the CRNA  Sim Su

## 2018-11-02 NOTE — DISCHARGE INSTRUCTIONS
You are being scheduled for a shoulder arthroscopy to treat your symptoms  Below are some instructions and information on what to expect before and after your surgery  Pre-Surgical Preparation for Arthroscopic Shoulder Surgery: You will be contacted the evening prior to your surgery to confirm the scheduled time of the procedure and when to arrive at the hospital    Do not eat or drink anything after midnight the night before your surgery  Since you are having out-patient surgery, make sure that you have someone who can drive you home later in the day  Also, prepare that person for a long day, as the process of safely preparing for and recovering from the procedure is more time consuming than the actual procedure! As you will be in a sling after surgery, please wear or bring a loose fitting button-down shirt so that you can easily place this over the sling when you leave the surgical suite  This avoids having to place the operative arm in a sleeve  Most patients find that this is the easiest outfit to wear for the first week or so after surgery so you may want to plan accordingly  Most patients find that lying down in bed after shoulder surgery accentuates their discomfort  This is likely related to the effect of gravity on the swelling in the shoulder  As a result, most patients sleep better in a recliner or in bed with pillows propped up behind their back for the first few days or weeks after surgery  It is a good idea to plan for this ahead of time so there will be less hassle getting things set up the night after surgery  What to Expect After Arthroscopic Shoulder Surgery: It is normal to have swelling and discomfort in the shoulder for several days or a week after surgery  It is also normal to have a small amount of drainage from the surgical wounds (especially the first few days after surgery), as we put fluid into the shoulder to visualize the structures during surgery  It is NOT normal to have foul smelling, purulent drainage and if this is noted, please contact the office immediately or proceed to the emergency room for evaluation as this may indicate an infection  Applying ice bags to the shoulder may help with pain that is not controlled by the pain medicine  Ice should be applied 20-30 minutes at a time, every hour or two  Make sure to put a thin towel or T-shirt next to your skin to avoid direct contact of the ice with the skin  Icing is most helpful in the first 48 hours, although many people find that continuing past this time frame lessens their postoperative pain  Please note that your post-operative dressing is not conductive to ice, so if you need to, it is okay to remove that dressing even the night after surgery and place band-aids over the wounds in order for the ice to take effect  Pain Control    Most patients will receive a nerve block, the local anesthetic may keep your whole arm numb for several hours (12-24 in most cases)  When the block is beginning to wear off, you will first feel a pins and needles sensation in your hand  This is a warning that you may start experiencing more pain, so please take a pain pill if you have not already  You will be given a prescription for pain medication when you are discharged from the hospital  If you find you do not tolerate that type of pain medicine well, call our office and we will try another one  The anesthesiologists have also been providing most patients with a catheter that is left in place after the block  The catheter is connected to a small pump which will continue to provide numbing medicine and help prolong the pain control from the block  Unfortunately this catheter is not as effective as the initial block, but can still be very helpful in managing the pain  Even with the block the pain can be significant and a narcotic pain medication is often required to manage the pain    A prescription for this will be provided but only a limited number of pills will be prescribed to help manage the acute phase of recovery  Outside of the acute phase (5 or so days), this medication will not be indicated  In addition to the narcotic pain medication, it is safe to use an anti-inflammatory (unless the patient has a medical condition that would not allow safe use of this mediation)  This includes the Advil, Motrin, Ibuprofen and Alleve category of medications  Simply follow the over the counter dosing on the package and use as indicated as another adjunct  Importantly since these medications are all very similar, use only one of them  Tylenol is a separate medication that can be utilized as well and can be taken at the same time as the other medication or given in a "staggered" manner  Just make sure that you follow the dosing on the over the counter bottle instructions  Also make sure that the pain medication prescribed by Dr Roxie Naylor team does not contain acetaminophen (this is found in Percocet and Vicodin)  Typically we do not prescribe those types of pain medications but if for some reason that has been prescribed DO NOT add more Tylenol (acetaminophen) as you could end up taking too much of that medication  As mentioned above, most patients find that lying down accentuates their discomfort  You might sleep better in a recliner, or propped up in bed  Dr Bhumi Sabillon encourages patients to safely ambulate around the house as much as possible in the first few days after the procedure as this can help with blood circulation in the legs  While the incidence of blood clots is very rare following shoulder surgery, early ambulation is a great way to help decrease the already low rate  24-48 hours after the surgery you may remove the bandage and cover incisions with Band-Aids if needed   At that time you may shower, the wounds will have a surgical glue that will protect them from shower water but do not submerge your incisions directly (bathing or swimming) until at least 2 weeks post-operatively  Unless noted otherwise in your discharge paperwork, Dr Eufemia Monge uses absorbable sutures so they do not need to be removed  Dr Jacky Mack physician assistant (PA) will see you in the office a few days after the procedure to review the intra-operative findings and to initiate physical therapy if appropriate  A post-operative appointment should have been scheduled for you already, but if for some reason this did not happen, please call the office to make one  Physical therapy is important after nearly all shoulder surgeries and a detailed rehabilitation plan based on the specific intra-operative findings and procedures will be provided to your therapist at the first post-operative office visit  Most patients have post-operative therapy appointments scheduled pre-operatively, but if you do not, that will be handled at the first post-operative office visit  Unless expressly directed otherwise it is safe to remove the sling even the first day after the surgery and let the arm hang by the side  This allows patients to shower and even put a shirt on (bad arm in the sleeve first)  It is also safe to flex and extend their wrist, hand and fingers as much as possible when the block wears off  These simple motions can serve to pump fluid out of the forearm and decrease swelling in the arm

## 2018-11-04 NOTE — PROGRESS NOTES
PT Evaluation     Today's date: 2018  Patient name: Vinicio Pantoja  : 1965  MRN: [id number]  Referring provider: Audrey Urrutia PA-C  Dx:   Encounter Diagnosis     ICD-10-CM    1  Acute pain of right shoulder M25 511    2  Orthopedic aftercare Z47 89        Start Time: 1700  Stop Time: 1745  Total time in clinic (min): 45 minutes    Assessment  Impairments: abnormal muscle tone, abnormal or restricted ROM, abnormal movement and impaired physical strength    Assessment details: Patient is s/p 4 day(s)  right rotator cuff repair  Patient is doing well post operatively  Pt is correctly fitted in abduction sling  Incisions are clean and dry with no visible signs of infection  Patient would benefit from skilled PT at this time to improve function, reduce pain, increase ROM, and return to premorbid status  Understanding of Dx/Px/POC: good   Prognosis: good    Goals  Short Term Goals: to be achieved by 4 weeks  1) Patient to be independent with basic HEP  2) Decrease pain to 3/10 at its worst   3) Increase shoulder PROM by 5-10 degrees     Long Term Goals: to be achieved by discharge  1) FOTO equal to or greater than 67    2) Patient to be independent with comprehensive HEP  3) Abolish pain for improved quality of life  4) Increase shoulder ROM to within functional limits to improve a/iadls  5) Increase shoulder strength to 5/5 MMT grade in all planes to improve a/iadls  6) Patient to return to full duty at work  Plan  Patient would benefit from: skilled PT  Planned modality interventions: cryotherapy and TENS  Planned therapy interventions: manual therapy, neuromuscular re-education, patient education, therapeutic activities, therapeutic exercise and functional ROM exercises  Frequency: 2-3x per week for 4-6 weeks    Treatment plan discussed with: patient        Subjective Evaluation    History of Present Illness  Date of surgery: 2018  Mechanism of injury: Surgical Arthroscopy Right Shoulder with Rotator Cuff Repair, Removal Of Loose Body and Extensive Debridement with Dr Nelson Singh  Rehabilitation to follow standard rotator cuff repair  Patient taking oxycodone for pain relief  Patient correctly fitted in abduction sling; however, does admit to not wearing sling when sleeping  I educated patient on importance of protecting the integrity of the repair  Patient understood after conversation  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   Pain  Current pain rating: 3  At best pain ratin  At worst pain ratin  Relieving factors: relaxation, ice and medications  Exacerbated by: Movement  Hand dominance: right    Treatments  Previous treatment: injection treatment  Patient Goals  Patient goals for therapy: decreased pain, increased strength, return to work, independence with ADLs/IADLs and increased motion          Objective     Active Range of Motion     Right Elbow   Flexion: 135 degrees   Extension: 0 degrees   Forearm supination: WFL  Forearm pronation: [facility]    Additional Active Range of Motion Details  Wrist ROM is WNL in all planes   strength: 110# L and 95# R   Neurovascular status intact     Passive Range of Motion     Additional Passive Range of Motion Details  N/T due to protocol guidelines     Normal ROM of the cervical spine       Flowsheet Rows      Most Recent Value   PT/OT G-Codes   Current Score  41   Projected Score  67   FOTO information reviewed  Yes   Assessment Type  Evaluation   G code set  Other PT/OT Primary   Other PT Primary Current Status ()  CK   Other PT Primary Goal Status ()  CJ          Precautions: rtc repair 18    Daily Treatment Diary     Manual              PROM of R shoulder                                                                      Exercise Diary              Pendulums             Elbow ROM             Wrist AROM             Digiflex             T/S extension             Scapular retraction Modalities  11/6            CP to finish  10 min                                        Patient was given a HEP with verbal and written instruction x 10 minutes

## 2018-11-05 ENCOUNTER — OFFICE VISIT (OUTPATIENT)
Dept: OBGYN CLINIC | Facility: HOSPITAL | Age: 53
End: 2018-11-05

## 2018-11-05 VITALS
BODY MASS INDEX: 30.41 KG/M2 | DIASTOLIC BLOOD PRESSURE: 84 MMHG | SYSTOLIC BLOOD PRESSURE: 128 MMHG | HEART RATE: 81 BPM | WEIGHT: 224.2 LBS

## 2018-11-05 DIAGNOSIS — Z47.89 AFTERCARE FOLLOWING SURGERY OF THE MUSCULOSKELETAL SYSTEM: Primary | ICD-10-CM

## 2018-11-05 PROCEDURE — 99024 POSTOP FOLLOW-UP VISIT: CPT | Performed by: PHYSICIAN ASSISTANT

## 2018-11-05 NOTE — PROGRESS NOTES
Assessment:       1  Aftercare following surgery of the musculoskeletal system          Plan:        Patient is doing well postoperatively  Operative note, images, and rehab protocol were discussed  All questions were addressed to the patient's satisfaction  Patient has an appointment with PT  Follow-up will be in 2 months to assess patient's progress  Subjective:     Patient ID: Latisha Laurent is a 47 y o  male  Chief Complaint:    HPI       Social History     Occupational History    Not on file  Social History Main Topics    Smoking status: Never Smoker    Smokeless tobacco: Never Used    Alcohol use Yes      Comment: social    Drug use: No    Sexual activity: Not on file      Review of Systems   Respiratory: Negative  Musculoskeletal: Positive for myalgias  Skin: Positive for wound  Neurological: Positive for weakness  Negative for numbness  Psychiatric/Behavioral: Negative  Objective:         Neurological Testing     Additional Neurological Details  Motor and sensation grossly intact  Physical Exam   Constitutional: He is oriented to person, place, and time  He appears well-developed and well-nourished  Cardiovascular: Intact distal pulses  Pulmonary/Chest: Effort normal    Musculoskeletal:   Arm in sling  Range of motion and strength not tested  Neurological: He is alert and oriented to person, place, and time  Motor and sensation grossly intact  Skin: Skin is warm and dry  Incisions dry and clean  Psychiatric: He has a normal mood and affect   His behavior is normal

## 2018-11-05 NOTE — LETTER
November 5, 2018     Patient: Vinicio Pantoja   YOB: 1965   Date of Visit: 11/5/2018       To Whom it May Concern:    Jaylyn Chery is under my professional care  He had a right shoulder arthroscopic rotator cuff repair on 11/02/2018  He was seen in my office on 11/5/2018  He may return to work on 11/12/2018 and may return to work with limitations Of left hand work only  No lifting, pulling, or pushing with right upper extremity  Sling must be on at all times for 6 weeks from the date of surgery  We will re-evaluate his work status at his next visit       If you have any questions or concerns, please don't hesitate to call           Sincerely,          Leticia Ojeda PA-C        CC: No Recipients

## 2018-11-06 ENCOUNTER — EVALUATION (OUTPATIENT)
Dept: PHYSICAL THERAPY | Age: 53
End: 2018-11-06
Payer: COMMERCIAL

## 2018-11-06 DIAGNOSIS — M25.511 ACUTE PAIN OF RIGHT SHOULDER: Primary | ICD-10-CM

## 2018-11-06 DIAGNOSIS — Z47.89 ORTHOPEDIC AFTERCARE: ICD-10-CM

## 2018-11-06 PROCEDURE — G8991 OTHER PT/OT GOAL STATUS: HCPCS | Performed by: PHYSICAL THERAPIST

## 2018-11-06 PROCEDURE — G8990 OTHER PT/OT CURRENT STATUS: HCPCS | Performed by: PHYSICAL THERAPIST

## 2018-11-06 PROCEDURE — 97161 PT EVAL LOW COMPLEX 20 MIN: CPT | Performed by: PHYSICAL THERAPIST

## 2018-11-09 ENCOUNTER — OFFICE VISIT (OUTPATIENT)
Dept: PHYSICAL THERAPY | Age: 53
End: 2018-11-09
Payer: COMMERCIAL

## 2018-11-09 DIAGNOSIS — M25.511 ACUTE PAIN OF RIGHT SHOULDER: Primary | ICD-10-CM

## 2018-11-09 DIAGNOSIS — Z47.89 ORTHOPEDIC AFTERCARE: ICD-10-CM

## 2018-11-09 PROCEDURE — 97112 NEUROMUSCULAR REEDUCATION: CPT

## 2018-11-09 PROCEDURE — 97110 THERAPEUTIC EXERCISES: CPT

## 2018-11-09 PROCEDURE — 97140 MANUAL THERAPY 1/> REGIONS: CPT

## 2018-11-09 NOTE — PROGRESS NOTES
Daily Note     Today's date: 2018  Patient name: Chela Damon  : 1965  MRN: [id number]  Referring provider: Jorge Slutana PA-C  Dx:   Encounter Diagnosis     ICD-10-CM    1  Acute pain of right shoulder M25 511    2  Orthopedic aftercare Z47 89                   Subjective: pt reports feeling a "pop' when donning tshirt, pt reports felt better after this      Objective: See treatment diary below      Assessment: Tolerated treatment well  Patient would benefit from continued PT  Good available PROM R shoulder as per protocol      Plan: Continue per plan of care  Progress treatment as tolerated      Precautions: rtc repair 18    Daily Treatment Diary     Manual  18            PROM of R shoulder  20 min                                                                    Exercise Diary  18            Pendulums 30x ea            Elbow ROM 30x            Wrist AROM 30x ea            Digiflex grn 50x2            T/S extension 30x            Scapular retraction 30x5"                                                                                                                                                                                                      Modalities  18           CP to finish  10 min 10 min

## 2018-11-13 ENCOUNTER — OFFICE VISIT (OUTPATIENT)
Dept: PHYSICAL THERAPY | Age: 53
End: 2018-11-13
Payer: COMMERCIAL

## 2018-11-13 DIAGNOSIS — M25.511 ACUTE PAIN OF RIGHT SHOULDER: Primary | ICD-10-CM

## 2018-11-13 DIAGNOSIS — Z47.89 ORTHOPEDIC AFTERCARE: ICD-10-CM

## 2018-11-13 PROCEDURE — 97140 MANUAL THERAPY 1/> REGIONS: CPT | Performed by: PHYSICAL THERAPIST

## 2018-11-13 PROCEDURE — 97110 THERAPEUTIC EXERCISES: CPT | Performed by: PHYSICAL THERAPIST

## 2018-11-13 PROCEDURE — 97112 NEUROMUSCULAR REEDUCATION: CPT | Performed by: PHYSICAL THERAPIST

## 2018-11-13 NOTE — PROGRESS NOTES
Daily Note     Today's date: 2018  Patient name: Fern Coppola  : 1965  MRN: [id number]  Referring provider: Lesley Hahn PA-C  Dx:   Encounter Diagnosis     ICD-10-CM    1  Acute pain of right shoulder M25 511    2  Orthopedic aftercare Z47 89        Start Time: 1700  Stop Time: 1800  Total time in clinic (min): 60 minutes    Subjective: No new complaints today  Objective: See treatment diary below      Assessment: Tolerated treatment well  Stage I PROM goals achieved  No pain with treatment today  Good compliance with protocol and clinical exercise program        Plan: Continue per plan of care       Precautions: rtc repair 18    Daily Treatment Diary     Manual  18           PROM of R shoulder  20 min 25 min Protocol guidelines                                                                   Exercise Diary  18           Pendulums 30x ea 5 min all planes           Elbow ROM 30x 30x           Wrist AROM 30x ea 30x           Digiflex grn 50x2 4x10 black           T/S extension 30x 30x           Scapular retraction 30x5" 30x5"           Upper trap stretch   5x10" b/l                                                                                                                                                                                         Modalities  18           CP to finish  10 min 10 min

## 2018-11-14 ENCOUNTER — OFFICE VISIT (OUTPATIENT)
Dept: PHYSICAL THERAPY | Age: 53
End: 2018-11-14
Payer: COMMERCIAL

## 2018-11-14 DIAGNOSIS — Z47.89 ORTHOPEDIC AFTERCARE: ICD-10-CM

## 2018-11-14 DIAGNOSIS — M25.511 ACUTE PAIN OF RIGHT SHOULDER: Primary | ICD-10-CM

## 2018-11-14 PROCEDURE — 97140 MANUAL THERAPY 1/> REGIONS: CPT

## 2018-11-14 PROCEDURE — 97112 NEUROMUSCULAR REEDUCATION: CPT

## 2018-11-14 PROCEDURE — 97110 THERAPEUTIC EXERCISES: CPT

## 2018-11-14 NOTE — PROGRESS NOTES
Daily Note     Today's date: 2018  Patient name: Susie Mohr  : 1965  MRN: [id number]  Referring provider: Jessy Pennington PA-C  Dx:   Encounter Diagnosis     ICD-10-CM    1  Acute pain of right shoulder M25 511    2  Orthopedic aftercare Z47 89                   Subjective:  Pt reports R shoulder     Objective: See treatment diary below      Assessment: reviewed protocol with pt  PROM R shoulder as per protocol  Plan: Continue per plan of care  Progress treament per protocol       Precautions: rtc repair 18    Daily Treatment Diary     Manual  18          PROM of R shoulder  20 min 25 min Protocol guidelines 23 min                                                                  Exercise Diary  18          Pendulums 30x ea 5 min all planes 5 min all planes          Elbow ROM 30x 30x 30x          Wrist AROM 30x ea 30x 40x          Digiflex grn 50x2 4x10 black 40x          T/S extension 30x 30x 30x5"          Scapular retraction 30x5" 30x5" 30x5"          Upper trap stretch   5x10" b/l  5x15" ea                                                                                                                                                                                       Modalities  18           CP to finish  10 min 10 min                                       1:1 treatment [phone]

## 2018-11-15 ENCOUNTER — APPOINTMENT (OUTPATIENT)
Dept: PHYSICAL THERAPY | Age: 53
End: 2018-11-15
Payer: COMMERCIAL

## 2018-11-19 ENCOUNTER — OFFICE VISIT (OUTPATIENT)
Dept: PHYSICAL THERAPY | Age: 53
End: 2018-11-19
Payer: COMMERCIAL

## 2018-11-19 DIAGNOSIS — M25.511 ACUTE PAIN OF RIGHT SHOULDER: Primary | ICD-10-CM

## 2018-11-19 DIAGNOSIS — Z47.89 ORTHOPEDIC AFTERCARE: ICD-10-CM

## 2018-11-19 PROCEDURE — 97112 NEUROMUSCULAR REEDUCATION: CPT

## 2018-11-19 PROCEDURE — 97140 MANUAL THERAPY 1/> REGIONS: CPT

## 2018-11-19 PROCEDURE — 97110 THERAPEUTIC EXERCISES: CPT

## 2018-11-19 NOTE — PROGRESS NOTES
Daily Note     Today's date: 2018  Patient name: Sahara Welsh  : 1965  MRN: [id number]  Referring provider: Macarena Thompson PA-C  Dx:   Encounter Diagnosis     ICD-10-CM    1  Acute pain of right shoulder M25 511    2  Orthopedic aftercare Z47 89                   Subjective:  Pt reports he's doing well and HEP compliance  Objective: See treatment diary below      Assessment: pt doing well as per protocol  Pt has  Good available PROM as per protocol  Plan: Continue per plan of care  Progress treament per protocol  Will cont 1-2x/wk to monitor ROM and progress as able as per protocol      Precautions: rtc repair 18    Daily Treatment Diary     Manual  18         PROM of R shoulder  20 min 25 min Protocol guidelines 23 min 10 min                                                                 Exercise Diary  18         Pendulums 30x ea 5 min all planes 5 min all planes 5 min ea plane         Elbow ROM 30x 30x 30x 30x         Wrist AROM 30x ea 30x 40x 40x         Digiflex grn 50x2 4x10 black 40x 40x         T/S extension 30x 30x 30x5" 30x5"         Scapular retraction 30x5" 30x5" 30x5" 30x5"         Upper trap stretch   5x10" b/l  5x15" ea 5x15" ea                                                                                                                                                                                      Modalities  18           CP to finish  10 min 10 min                                       1:1treatment [phone]

## 2018-11-21 ENCOUNTER — APPOINTMENT (OUTPATIENT)
Dept: PHYSICAL THERAPY | Age: 53
End: 2018-11-21
Payer: COMMERCIAL

## 2018-11-27 ENCOUNTER — APPOINTMENT (OUTPATIENT)
Dept: PHYSICAL THERAPY | Age: 53
End: 2018-11-27
Payer: COMMERCIAL

## 2018-11-29 ENCOUNTER — APPOINTMENT (OUTPATIENT)
Dept: PHYSICAL THERAPY | Age: 53
End: 2018-11-29
Payer: COMMERCIAL

## 2018-11-29 ENCOUNTER — OFFICE VISIT (OUTPATIENT)
Dept: PHYSICAL THERAPY | Age: 53
End: 2018-11-29
Payer: COMMERCIAL

## 2018-11-29 DIAGNOSIS — Z47.89 ORTHOPEDIC AFTERCARE: ICD-10-CM

## 2018-11-29 DIAGNOSIS — M25.511 ACUTE PAIN OF RIGHT SHOULDER: Primary | ICD-10-CM

## 2018-11-29 PROCEDURE — 97112 NEUROMUSCULAR REEDUCATION: CPT

## 2018-11-29 PROCEDURE — 97110 THERAPEUTIC EXERCISES: CPT

## 2018-11-29 PROCEDURE — 97140 MANUAL THERAPY 1/> REGIONS: CPT

## 2018-11-29 NOTE — PROGRESS NOTES
Daily Note     Today's date: 2018  Patient name: Sonido Tillman  : 1965  MRN: [id number]  Referring provider: Sebastian Cerrato PA-C  Dx:   Encounter Diagnosis     ICD-10-CM    1  Acute pain of right shoulder M25 511    2  Orthopedic aftercare Z47 89                   Subjective: pt reports no problems at this time and HEP compliance      Objective: See treatment diary below      Assessment: Tolerated treatment well  Patient would benefit from continued PT  Pt has good available PROM R shoulder as per protocol  Plan: Progress treament per protocol  next visit    Precautions: rtc repair 18    Daily Treatment Diary     Manual  18        PROM of R shoulder  20 min 25 min Protocol guidelines 23 min 10 min 20 min                                                                Exercise Diary  18        Pendulums 30x ea 5 min all planes 5 min all planes 5 min ea plane 5 min         Elbow ROM 30x 30x 30x 30x 30x        Wrist AROM 30x ea 30x 40x 40x 40x        Digiflex grn 50x2 4x10 black 40x 40x 40x        T/S extension 30x 30x 30x5" 30x5" 30x5"        Scapular retraction 30x5" 30x5" 30x5" 30x5" 30x5"        Upper trap stretch   5x10" b/l  5x15" ea 5x15" ea 5x15" ea                                                                                                                                                                                     Modalities  18           CP to finish  10 min 10 min

## 2018-12-04 ENCOUNTER — OFFICE VISIT (OUTPATIENT)
Dept: PHYSICAL THERAPY | Age: 53
End: 2018-12-04
Payer: COMMERCIAL

## 2018-12-04 DIAGNOSIS — Z47.89 ORTHOPEDIC AFTERCARE: ICD-10-CM

## 2018-12-04 DIAGNOSIS — M25.511 ACUTE PAIN OF RIGHT SHOULDER: Primary | ICD-10-CM

## 2018-12-04 PROCEDURE — 97112 NEUROMUSCULAR REEDUCATION: CPT | Performed by: PHYSICAL THERAPIST

## 2018-12-04 PROCEDURE — 97140 MANUAL THERAPY 1/> REGIONS: CPT | Performed by: PHYSICAL THERAPIST

## 2018-12-04 PROCEDURE — 97110 THERAPEUTIC EXERCISES: CPT | Performed by: PHYSICAL THERAPIST

## 2018-12-04 PROCEDURE — G8990 OTHER PT/OT CURRENT STATUS: HCPCS | Performed by: PHYSICAL THERAPIST

## 2018-12-04 PROCEDURE — G8991 OTHER PT/OT GOAL STATUS: HCPCS | Performed by: PHYSICAL THERAPIST

## 2018-12-04 NOTE — PROGRESS NOTES
Daily Note     Today's date: 2018  Patient name: Yoshi Alfaro  : 1965  MRN: [id number]  Referring provider: Macey Milligan PA-C  Dx:   Encounter Diagnosis     ICD-10-CM    1  Acute pain of right shoulder M25 511    2  Orthopedic aftercare Z47 89        Start Time: 1630  Stop Time: 1715  Total time in clinic (min): 45 minutes    Subjective: Pt reports no new symptoms  Objective: See treatment diary below      Assessment: Tolerated treatment well  Pt's POC was progressed per protocol to include AAROM in anticpation for AROM on week 6  Pt responded well and had no increases in symptoms with exercises  Patient demonstrated fatigue post treatment and would benefit from continued PT      Plan: Continue per plan of care       Precautions: rtc repair 18    Daily Treatment Diary     Manual  18 12/       PROM of R shoulder  20 min 25 min Protocol guidelines 23 min 10 min 20 min 20 min                                                               Exercise Diary  18 12/4       Pendulums 30x ea 5 min all planes 5 min all planes 5 min ea plane 5 min  3 min       Elbow ROM 30x 30x 30x 30x 30x 30x       Wrist AROM 30x ea 30x 40x 40x 40x 40x       Digiflex grn 50x2 4x10 black 40x 40x 40x 40x       T/S extension 30x 30x 30x5" 30x5" 30x5" 30*5"       Scapular retraction 30x5" 30x5" 30x5" 30x5" 30x5" 20*5"       Upper trap stretch   5x10" b/l  5x15" ea 5x15" ea 5x15" ea 5*15"       AAROM flexion, abd, ER      2*10                                                                                                                                                                       Modalities  18           CP to finish  10 min 10 min

## 2018-12-11 ENCOUNTER — OFFICE VISIT (OUTPATIENT)
Dept: PHYSICAL THERAPY | Age: 53
End: 2018-12-11
Payer: COMMERCIAL

## 2018-12-11 DIAGNOSIS — M25.511 ACUTE PAIN OF RIGHT SHOULDER: Primary | ICD-10-CM

## 2018-12-11 DIAGNOSIS — Z47.89 ORTHOPEDIC AFTERCARE: ICD-10-CM

## 2018-12-11 PROCEDURE — 97110 THERAPEUTIC EXERCISES: CPT | Performed by: PHYSICAL THERAPIST

## 2018-12-11 PROCEDURE — 97140 MANUAL THERAPY 1/> REGIONS: CPT | Performed by: PHYSICAL THERAPIST

## 2018-12-11 NOTE — PROGRESS NOTES
Daily Note     Today's date: 2018  Patient name: Jose Hernandez  : 1965  MRN: [id number]  Referring provider: Gi Piedra PA-C  Dx:   Encounter Diagnosis     ICD-10-CM    1  Acute pain of right shoulder M25 511    2  Orthopedic aftercare Z47 89        Start Time: 1615  Stop Time: 1700  Total time in clinic (min): 45 minutes    Subjective: Pt reports that his shoulder is doing well and he is performing his exercises as instructed  Objective: See treatment diary below      Assessment: Tolerated treatment well  Good tolerance to progression in AAROM  Cueing needed for recovery periods  Patient demonstrated fatigue post treatment and would benefit from continued PT      Plan: Continue per plan of care  Precautions: rtc repair 18    Daily Treatment Diary     Manual  18      PROM of R shoulder  20 min 25 min Protocol guidelines 23 min 10 min 20 min 20 min 20 min  Exercise Diary  18      Pendulums 30x ea 5 min all planes 5 min all planes 5 min ea plane 5 min  3 min 3 min  Elbow ROM, wrist AROM 30x 30x 30x 30x 30x 30x HEP      Digiflex grn 50x2 4x10 black 40x 40x 40x 40x 3' black      T/S extension 30x 30x 30x5" 30x5" 30x5" 30*5" 30*5''      Scapular retraction 30x5" 30x5" 30x5" 30x5" 30x5" 20*5" 20*5"      Upper trap stretch   5x10" b/l  5x15" ea 5x15" ea 5x15" ea 5*15" np      AAROM flexion, abd, ER      2*10 2*10 ea                                                                                          Modalities  18           CP to finish  10 min 10 min

## 2018-12-18 ENCOUNTER — EVALUATION (OUTPATIENT)
Dept: PHYSICAL THERAPY | Age: 53
End: 2018-12-18
Payer: COMMERCIAL

## 2018-12-18 DIAGNOSIS — M25.511 ACUTE PAIN OF RIGHT SHOULDER: Primary | ICD-10-CM

## 2018-12-18 DIAGNOSIS — Z47.89 ORTHOPEDIC AFTERCARE: ICD-10-CM

## 2018-12-18 PROCEDURE — 97140 MANUAL THERAPY 1/> REGIONS: CPT | Performed by: PHYSICAL THERAPIST

## 2018-12-18 PROCEDURE — 97110 THERAPEUTIC EXERCISES: CPT | Performed by: PHYSICAL THERAPIST

## 2018-12-18 NOTE — PROGRESS NOTES
Re-evaluation    Today's date: 2018  Patient name: Caroline Mcbride  : 1965  MRN: [id number]  Referring provider: Sherif Edwards PA-C  Dx:   Encounter Diagnosis     ICD-10-CM    1  Acute pain of right shoulder M25 511    2  Orthopedic aftercare Z47 89        Start Time: 33 64 74  Stop Time: 1705  Total time in clinic (min): 50 minutes      Plan  Patient would benefit from: skilled PT  Planned therapy interventions: manual therapy, neuromuscular re-education, patient education, therapeutic activities, therapeutic exercise and functional ROM exercises  Frequency: 1-2 times per week, 8 weeks  Strengthening to begin in 6 weeks (12 weeks post-op)  Treatment plan discussed with: patient     Objective: See treatment diary below      Assessment  Impairments:decreased strength    Assessment details: Patient is making great gains at this time and progressing per protocol  Introduced AROM and RTC isometrics today  Patient demonstrates full pain-free AROM at this time  He reports adhering to lifting restriction  Patient olerated treatment well today with progression in exercises  Patient required cueing to avoid shoulder hiking  Overall patient is making good gains at this time and will continue to benefit from skilled therapy  Understanding of Dx/Px/POC: good   Prognosis: good   Goals  Short Term Goals: to be achieved by 4 weeks  met  1) Patient to be independent with basic HEP  met  2) Decrease pain to 3/10 at its worst  met  3) Increase shoulder PROM by 5-10 degrees  met    Long Term Goals: to be achieved by discharge  1) FOTO equal to or greater than 67    2) Patient to be independent with comprehensive HEP  met  3) Abolish pain for improved quality of life  met  4) Increase shoulder ROM to within functional limits to improve a/iadls  met  5) Increase shoulder strength to 5/5 MMT grade in all planes to improve a/iadls   Not met  6) Patient to return to full duty at work     Keyanna Peguero Evaluation     History of Present Illness  Date of surgery: 2018  Mechanism of injury: Surgical Arthroscopy Right Shoulder with Rotator Cuff Repair, Removal Of Loose Body and Extensive Debridement with Dr Carmen Tobin  Rehabilitation to follow standard rotator cuff repair  Patient taking oxycodone for pain relief  Patient correctly fitted in abduction sling; however, does admit to not wearing sling when sleeping  I educated patient on importance of protecting the integrity of the repair  Patient understood after conversation  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   Pain  Current pain ratin  At best pain ratin  At worst pain ratin  Relieving factors: ache  Exacerbated by: Movement      Hand dominance: right    Treatments  Previous treatment: injection treatment  Patient Goals  Patient goals for therapy: decreased pain, increased strength, return to work, independence with ADLs/IADLs and increased motion      Objective      Active Range of Motion     Full pain-free cervical AROM              MMT         AROM    Shoulder       L       R        L           R   Flex  5 4+ WNL WNL   Extn  5 5 WNL WNL   Abd  5 4+ WNL WNL   Add  5 4+ WNL WNL   IR  5 4+ WNL WNL   ER  5 5 WNL WNL   Behind back IR   T7 T7          Low Trap 4 4-     Mid Trap 4 4-     Serratus A 4 3+                         MMT    Elbow       L       R   Flex  5 5   Extn  5 5              MMT    Wrist       L       R   Flex  5 5   Extn   5 5    straight 5 5     Scapula position: right scapular winging    Shoulder: painful arc sign= -   apprehension test= -   Anterior drawer test = -   Maple test= -  biceps load=  - NEERs= - Rubio/Morro test = - Empty can test= - Speed's test= -  Infraspinatus test= -        Shoulder joint mobility: WNL    Precautions: rtc repair 18     Daily Treatment Diary      Manual  18       PROM of R shoulder  20 min 25 min Protocol guidelines 23 min 10 min 20 min 20 min 20 min   10 min                                                                                                              Exercise Diary  11/9/18 11/13 11/14/18 11/19/18 11/29/18 12/4 12/11 12/18       Pendulums, elbow ROM, scap retract  30x ea 5 min all planes 5 min all planes 5 min ea plane 5 min  3 min 3 min   HEP       Digiflex grn 50x2 4x10 black 40x 40x 40x 40x 3' black  np       T/S extension 30x 30x 30x5" 30x5" 30x5" 30*5" 30*5''  np       AAROM flexion, abd, ER           2*10 2*10 ea   np        4-way shoulder iso                2x10, 5"        rows                3x10 GTB        low rows               3x10 GTB        wall slide               2x10, 3"        shoulder Abd   AROM               3x10        shoulder flex AROM               3x10

## 2018-12-27 ENCOUNTER — OFFICE VISIT (OUTPATIENT)
Dept: PHYSICAL THERAPY | Age: 53
End: 2018-12-27
Payer: COMMERCIAL

## 2018-12-27 DIAGNOSIS — M25.511 ACUTE PAIN OF RIGHT SHOULDER: Primary | ICD-10-CM

## 2018-12-27 DIAGNOSIS — Z47.89 ORTHOPEDIC AFTERCARE: ICD-10-CM

## 2018-12-27 PROCEDURE — 97110 THERAPEUTIC EXERCISES: CPT | Performed by: PHYSICAL THERAPIST

## 2018-12-27 PROCEDURE — G8991 OTHER PT/OT GOAL STATUS: HCPCS | Performed by: PHYSICAL THERAPIST

## 2018-12-27 PROCEDURE — G8992 OTHER PT/OT  D/C STATUS: HCPCS | Performed by: PHYSICAL THERAPIST

## 2018-12-27 NOTE — PROGRESS NOTES
Daily Note/Discharge Summary    Today's date: 2018  Patient name: Kerry Woo  : 1965  MRN: [id number]  Referring provider: Brittny Liu PA-C  Dx:   Encounter Diagnosis     ICD-10-CM    1  Acute pain of right shoulder M25 511    2  Orthopedic aftercare Z47 89        Start Time: 33 64 74  Stop Time: 1655  Total time in clinic (min): 40 minutes    Subjective: Patient reports that his shoulder has been doing well and he is adhering to not lifting greater than 2 lbs  Objective: See treatment diary below      Assessment: Tolerated treatment well  Patient reported that he would like to stop therapy as he could perform all exercises at home  I educated him on correct progression of when to start with strengthening based exercises  Plan: discharge to [facility]  Patient to contact clinic via phone PRN  Precautions: rtc repair 18     Daily Treatment Diary      Manual  18       PROM of R shoulder  20 min 25 min Protocol guidelines 23 min 10 min 20 min 20 min 20 min   10 min                                                                                                              Exercise Diary  18     Pendulums, elbow ROM, scap retract  30x ea 5 min all planes 5 min all planes 5 min ea plane 5 min  3 min 3 min   HEP       T/S extension 30x 30x 30x5" 30x5" 30x5" 30*5" 30*5''  np       AAROM flexion, abd, ER           2*10 2*10 ea   np        4-way shoulder iso                2x10, 5"  2x10, 5"      rows                3x10 GTB  3x10 GTB      low rows               3x10 GTB  3x10 GTB      wall slide               2x10, 3"  2x10, 5"      shoulder Abd  AROM               3x10  2x15      shoulder flex AROM               3x10  3x10     pulleys         5'    ER iso   Step out          2x10 YTB

## 2019-08-07 ENCOUNTER — TELEPHONE (OUTPATIENT)
Dept: GASTROENTEROLOGY | Facility: AMBULARY SURGERY CENTER | Age: 54
End: 2019-08-07

## 2019-08-07 DIAGNOSIS — Z86.010 HISTORY OF COLON POLYPS: Primary | ICD-10-CM

## 2019-11-01 ENCOUNTER — ANESTHESIA EVENT (OUTPATIENT)
Dept: GASTROENTEROLOGY | Facility: AMBULARY SURGERY CENTER | Age: 54
End: 2019-11-01

## 2019-11-13 ENCOUNTER — TELEPHONE (OUTPATIENT)
Dept: GASTROENTEROLOGY | Facility: AMBULARY SURGERY CENTER | Age: 54
End: 2019-11-13

## 2019-11-15 ENCOUNTER — ANESTHESIA (OUTPATIENT)
Dept: GASTROENTEROLOGY | Facility: AMBULARY SURGERY CENTER | Age: 54
End: 2019-11-15

## 2019-11-15 ENCOUNTER — HOSPITAL ENCOUNTER (OUTPATIENT)
Dept: GASTROENTEROLOGY | Facility: AMBULARY SURGERY CENTER | Age: 54
Setting detail: OUTPATIENT SURGERY
Discharge: HOME | End: 2019-11-15
Attending: INTERNAL MEDICINE | Admitting: INTERNAL MEDICINE
Payer: COMMERCIAL

## 2019-11-15 VITALS
TEMPERATURE: 98.1 F | DIASTOLIC BLOOD PRESSURE: 104 MMHG | WEIGHT: 223 LBS | BODY MASS INDEX: 30.2 KG/M2 | HEIGHT: 72 IN | RESPIRATION RATE: 18 BRPM | SYSTOLIC BLOOD PRESSURE: 147 MMHG | OXYGEN SATURATION: 97 % | HEART RATE: 66 BPM

## 2019-11-15 DIAGNOSIS — Z86.010 HX OF COLONIC POLYPS: ICD-10-CM

## 2019-11-15 PROCEDURE — 45380 COLONOSCOPY AND BIOPSY: CPT | Performed by: INTERNAL MEDICINE

## 2019-11-15 PROCEDURE — 45385 COLONOSCOPY W/LESION REMOVAL: CPT | Performed by: INTERNAL MEDICINE

## 2019-11-15 PROCEDURE — 88305 TISSUE EXAM BY PATHOLOGIST: CPT | Performed by: PATHOLOGY

## 2019-11-15 PROCEDURE — 45381 COLONOSCOPY SUBMUCOUS NJX: CPT | Performed by: INTERNAL MEDICINE

## 2019-11-15 PROCEDURE — NC001 PR NO CHARGE: Performed by: INTERNAL MEDICINE

## 2019-11-15 RX ORDER — LIDOCAINE HYDROCHLORIDE 10 MG/ML
INJECTION, SOLUTION INFILTRATION; PERINEURAL AS NEEDED
Status: DISCONTINUED | OUTPATIENT
Start: 2019-11-15 | End: 2019-11-15 | Stop reason: SURG

## 2019-11-15 RX ORDER — PROPOFOL 10 MG/ML
INJECTION, EMULSION INTRAVENOUS AS NEEDED
Status: DISCONTINUED | OUTPATIENT
Start: 2019-11-15 | End: 2019-11-15 | Stop reason: SURG

## 2019-11-15 RX ORDER — SODIUM CHLORIDE, SODIUM LACTATE, POTASSIUM CHLORIDE, CALCIUM CHLORIDE 600; 310; 30; 20 MG/100ML; MG/100ML; MG/100ML; MG/100ML
125 INJECTION, SOLUTION INTRAVENOUS CONTINUOUS
Status: DISCONTINUED | OUTPATIENT
Start: 2019-11-15 | End: 2019-11-19 | Stop reason: HOSPADM

## 2019-11-15 RX ORDER — SODIUM CHLORIDE, SODIUM LACTATE, POTASSIUM CHLORIDE, CALCIUM CHLORIDE 600; 310; 30; 20 MG/100ML; MG/100ML; MG/100ML; MG/100ML
INJECTION, SOLUTION INTRAVENOUS CONTINUOUS PRN
Status: DISCONTINUED | OUTPATIENT
Start: 2019-11-15 | End: 2019-11-15 | Stop reason: SURG

## 2019-11-15 RX ADMIN — PROPOFOL 30 MG: 10 INJECTION, EMULSION INTRAVENOUS at 08:28

## 2019-11-15 RX ADMIN — SODIUM CHLORIDE, SODIUM LACTATE, POTASSIUM CHLORIDE, AND CALCIUM CHLORIDE 125 ML/HR: .6; .31; .03; .02 INJECTION, SOLUTION INTRAVENOUS at 08:00

## 2019-11-15 RX ADMIN — PROPOFOL 50 MG: 10 INJECTION, EMULSION INTRAVENOUS at 08:37

## 2019-11-15 RX ADMIN — SODIUM CHLORIDE, SODIUM LACTATE, POTASSIUM CHLORIDE, AND CALCIUM CHLORIDE: .6; .31; .03; .02 INJECTION, SOLUTION INTRAVENOUS at 08:00

## 2019-11-15 RX ADMIN — PROPOFOL 50 MG: 10 INJECTION, EMULSION INTRAVENOUS at 08:31

## 2019-11-15 RX ADMIN — PROPOFOL 20 MG: 10 INJECTION, EMULSION INTRAVENOUS at 08:24

## 2019-11-15 RX ADMIN — LIDOCAINE HYDROCHLORIDE 50 MG: 10 INJECTION, SOLUTION INFILTRATION; PERINEURAL at 08:22

## 2019-11-15 RX ADMIN — PROPOFOL 150 MG: 10 INJECTION, EMULSION INTRAVENOUS at 08:22

## 2019-11-15 RX ADMIN — PROPOFOL 50 MG: 10 INJECTION, EMULSION INTRAVENOUS at 08:26

## 2019-11-15 NOTE — H&P
History and Physical - [facility]  Shaina Fitzgerald 49 y o  male MRN: [id number]    HPI: Marc Lambert is a 49y o  year old male who presents with hx of colon polyp     Review of Systems    Historical Information   Past Medical History:   Diagnosis Date    Arthritis     Fracture     right leg    Hyperlipidemia     diet controlled    Rotator cuff tear     bilateral    Snoring      Past Surgical History:   Procedure Laterality Date    COLONOSCOPY      HIP ARTHROPLASTY Right     KNEE ARTHROSCOPY Bilateral     [place] COLONOSCOPY FLX DX W/COLLJ SPEC WHEN PFRMD N/A 7/26/2016    Procedure: COLONOSCOPY;  Surgeon: Montserrat Mccormick MD;  [facility];   Service: Gastroenterology    [place] [address] ARTHROSCOP,SURG,W/ROTAT CUFF REPR Right 11/2/2018    Procedure: SHOULDER ARTHROSCOPIC ROTATOR CUFF REPAIR;  Surgeon: Helena Ayala MD;  [facility];  Service: Orthopedics    [place] WRIST Amy Tyler LIG Right 12/30/2016    Procedure: ENDOSCOPIC CARPAL TUNNEL RELEASE ;  Surgeon: Claudia Ghosh MD;  [facility];  Service: Orthopedics    WISDOM TOOTH EXTRACTION       Social History   Social History     Substance and Sexual Activity   Alcohol Use Yes    Frequency: 2-4 times a month    Drinks per session: 3 or 4     Social History     Substance and Sexual Activity   Drug Use No     Social History     Tobacco Use   Smoking Status Never Smoker   Smokeless Tobacco Never Used     Family History   Problem Relation Age of Onset    No Known Problems Family        Meds/Allergies       (Not in a hospital admission)    No Known Allergies    Objective     /91   Pulse 77   Temp 97 6 °F (36 4 °C) (Temporal)   Resp 16   Ht 6' (1 829 m)   Wt 101 kg (223 lb)   SpO2 99%   BMI 30 24 kg/m²       PHYSICAL EXAM    Gen: NAD  CV: RRR  CHEST: Clear  ABD: soft, NT/ND  EXT: no edema  Neuro: AAO      ASSESSMENT/PLAN:  This is a 49y o  year old male here for hx of colon polyp PLAN:   Procedure: colonoscopy

## 2019-11-15 NOTE — ANESTHESIA PREPROCEDURE EVALUATION
Review of Systems/Medical History          Cardiovascular  Hyperlipidemia,    Pulmonary       GI/Hepatic    Bowel prep            Endo/Other     GYN       Hematology   Musculoskeletal    Arthritis     Neurology   Psychology                Anesthesia Plan  ASA Score- 2     Anesthesia Type- IV sedation with anesthesia with ASA Monitors  Additional Monitors:   Airway Plan:     Comment: IV sedation,  standard ASA monitors  Risks and benefits discussed with patient; patient consented and agrees to proceed  I saw and evaluated the patient  If seen with CRNA, we have discussed the anesthetic plan and I am in agreement that the plan is appropriate for the patient        Plan Factors-    Induction- intravenous  Postoperative Plan-     Informed Consent- Anesthetic plan and risks discussed with patient  I personally reviewed this patient with the CRNA  Discussed and agreed on the Anesthesia Plan with the CRNA  Ne Edwards

## 2019-11-25 ENCOUNTER — TELEPHONE (OUTPATIENT)
Dept: GASTROENTEROLOGY | Facility: CLINIC | Age: 54
End: 2019-11-25

## 2019-11-25 NOTE — TELEPHONE ENCOUNTER
----- Message from Rodney Montes MD sent at 11/24/2019  8:04 PM EST -----  Please inform the patient that 1 polyp removed was a tubular adenoma, there was no high-grade dysplasia and no cancer  The polyp in the sigmoid colon was actually hyperplastic polyp which is benign has no cancer potential   The last polyp was also actually normal tissue  I recommend repeat colonoscopy in 5 years, please put in for recall  Please have the patient call with any questions or concerns

## 2019-11-25 NOTE — LETTER
November 25, 2019     Israel Barrera  [address] [facility] [address]      Dear Mr Sadia Melo:          [facility] office has attempted to call you in regards to your results, however, we have been unable to get a hold of you  Please give our office a call so we can review your results and answer any questions you may have in regards to your recent Colonoscopy Biopsies                 Sincerely,        Dennie Romance [facility]

## 2020-11-11 ENCOUNTER — OFFICE VISIT (OUTPATIENT)
Dept: OBGYN CLINIC | Facility: MEDICAL CENTER | Age: 55
End: 2020-11-11
Payer: COMMERCIAL

## 2020-11-11 ENCOUNTER — APPOINTMENT (OUTPATIENT)
Dept: RADIOLOGY | Facility: MEDICAL CENTER | Age: 55
End: 2020-11-11
Payer: COMMERCIAL

## 2020-11-11 VITALS
BODY MASS INDEX: 32.28 KG/M2 | HEART RATE: 76 BPM | TEMPERATURE: 97.2 F | SYSTOLIC BLOOD PRESSURE: 160 MMHG | WEIGHT: 238 LBS | DIASTOLIC BLOOD PRESSURE: 101 MMHG

## 2020-11-11 DIAGNOSIS — M25.552 LEFT HIP PAIN: ICD-10-CM

## 2020-11-11 DIAGNOSIS — M16.12 PRIMARY OSTEOARTHRITIS OF ONE HIP, LEFT: ICD-10-CM

## 2020-11-11 DIAGNOSIS — S76.012A STRAIN OF HIP FLEXOR, LEFT, INITIAL ENCOUNTER: Primary | ICD-10-CM

## 2020-11-11 PROCEDURE — 73502 X-RAY EXAM HIP UNI 2-3 VIEWS: CPT

## 2020-11-11 PROCEDURE — 99213 OFFICE O/P EST LOW 20 MIN: CPT | Performed by: ORTHOPAEDIC SURGERY

## 2020-11-11 RX ORDER — NAPROXEN 500 MG/1
500 TABLET ORAL 2 TIMES DAILY WITH MEALS
Qty: 60 TABLET | Refills: 1 | Status: SHIPPED | OUTPATIENT
Start: 2020-11-11

## 2021-09-28 ENCOUNTER — HOSPITAL ENCOUNTER (EMERGENCY)
Facility: HOSPITAL | Age: 56
Discharge: HOME | End: 2021-09-28
Attending: EMERGENCY MEDICINE | Admitting: EMERGENCY MEDICINE
Payer: OTHER MISCELLANEOUS

## 2021-09-28 VITALS
RESPIRATION RATE: 18 BRPM | SYSTOLIC BLOOD PRESSURE: 176 MMHG | HEART RATE: 71 BPM | TEMPERATURE: 98.7 F | DIASTOLIC BLOOD PRESSURE: 100 MMHG | OXYGEN SATURATION: 97 %

## 2021-09-28 DIAGNOSIS — H57.9 EYE PROBLEM: Primary | ICD-10-CM

## 2021-09-28 PROCEDURE — 99283 EMERGENCY DEPT VISIT LOW MDM: CPT

## 2021-09-28 PROCEDURE — 99284 EMERGENCY DEPT VISIT MOD MDM: CPT | Performed by: EMERGENCY MEDICINE

## 2021-09-28 RX ORDER — TETRACAINE HYDROCHLORIDE 5 MG/ML
1 SOLUTION OPHTHALMIC ONCE
Status: COMPLETED | OUTPATIENT
Start: 2021-09-28 | End: 2021-09-28

## 2021-09-28 RX ADMIN — TETRACAINE HYDROCHLORIDE 1 DROP: 5 SOLUTION OPHTHALMIC at 16:58

## 2021-09-28 RX ADMIN — FLUORESCEIN SODIUM 1 STRIP: 1 STRIP OPHTHALMIC at 16:58

## 2022-03-03 ENCOUNTER — PREP FOR PROCEDURE (OUTPATIENT)
Dept: SURGERY | Facility: CLINIC | Age: 57
End: 2022-03-03

## 2022-03-03 ENCOUNTER — CONSULT (OUTPATIENT)
Dept: SURGERY | Facility: CLINIC | Age: 57
End: 2022-03-03
Payer: COMMERCIAL

## 2022-03-03 VITALS
BODY MASS INDEX: 30.48 KG/M2 | HEART RATE: 93 BPM | HEIGHT: 72 IN | SYSTOLIC BLOOD PRESSURE: 149 MMHG | DIASTOLIC BLOOD PRESSURE: 92 MMHG | WEIGHT: 225 LBS

## 2022-03-03 DIAGNOSIS — K42.9 UMBILICAL HERNIA: Primary | ICD-10-CM

## 2022-03-03 DIAGNOSIS — K42.9 UMBILICAL HERNIA WITHOUT OBSTRUCTION AND WITHOUT GANGRENE: Primary | ICD-10-CM

## 2022-03-03 PROCEDURE — 99204 OFFICE O/P NEW MOD 45 MIN: CPT | Performed by: SURGERY

## 2022-03-03 RX ORDER — CEPHALEXIN 500 MG/1
CAPSULE ORAL
COMMUNITY
Start: 2022-01-04 | End: 2022-04-01 | Stop reason: ALTCHOICE

## 2022-03-03 RX ORDER — PSEUDOEPHEDRINE HCL 120 MG/1
TABLET, FILM COATED, EXTENDED RELEASE ORAL EVERY 12 HOURS
COMMUNITY
Start: 2021-10-20 | End: 2022-04-01 | Stop reason: ALTCHOICE

## 2022-03-03 RX ORDER — EFINACONAZOLE 100 MG/ML
SOLUTION TOPICAL
COMMUNITY
Start: 2021-09-21 | End: 2022-04-01 | Stop reason: ALTCHOICE

## 2022-03-03 NOTE — PROGRESS NOTES
Assessment/Plan:    Diagnoses and all orders for this visit:    Umbilical hernia without obstruction and without gangrene    Risks and benefits of an umbilical hernia repair were discussed with him including the potential for recurrence or bowel injury and he agrees to proceed  Subjective:      Patient ID: Vinicio Pantoja is a 55 y o  male  Patient presents for umbilical hernia consult  States he has had a bulge and discomfort at his umbilicus for one year  Does not limit his activities  The following portions of the patient's history were reviewed and updated as appropriate:     He  has a past medical history of Arthritis, Fracture, Hyperlipidemia, Rotator cuff tear, and Snoring  He  has a past surgical history that includes Hip Arthroplasty (Right); pr wrist arthroscop,release xvers lig (Right, 12/30/2016); pr colonoscopy flx dx w/collj spec when pfrmd (N/A, 7/26/2016); Knee arthroscopy (Bilateral); Colonoscopy; pr shldr arthroscop,surg,w/rotat cuff repr (Right, 11/2/2018); and [place] tooth extraction  His family history includes No Known Problems in his family  He  reports that he has quit smoking  His smoking use included cigarettes  He has never used smokeless tobacco  He reports current alcohol use  He reports that he does not use drugs  Current Outpatient Medications   Medication Sig Dispense Refill    Efinaconazole (Jublia) 10 % SOLN APPLY TO TOENAILS EVERY DAY FOR 48 WEEKS      pseudoephedrine (SUDAFED) 120 MG 12 hr tablet Take by mouth every 12 (twelve) hours      cephalexin (KEFLEX) 500 mg capsule TAKE 4 CAPSULES BY MOUTH 1 HOUR PRIOR TO APPOINTMENT      naproxen (NAPROSYN) 500 mg tablet Take 1 tablet (500 mg total) by mouth 2 (two) times a day with meals 60 tablet 1     No current facility-administered medications for this visit  He has No Known Allergies       Review of Systems   HENT: Positive for hearing loss and tinnitus  Eyes: Positive for visual disturbance  Musculoskeletal: Positive for joint swelling  All other systems reviewed and are negative  Objective:      /92   Pulse 93   Ht 6' (1 829 m)   Wt 102 kg (225 lb)   BMI 30 52 kg/m²          Physical Exam  Constitutional:       General: He is not in acute distress  Appearance: He is not ill-appearing  HENT:      Mouth/Throat:      Mouth: Mucous membranes are moist    Eyes:      Extraocular Movements: Extraocular movements intact  Cardiovascular:      Rate and Rhythm: Normal rate  Pulmonary:      Effort: Pulmonary effort is normal    Abdominal:      General: Bowel sounds are normal       Palpations: Abdomen is soft  Hernia: A hernia (Soft reducible umbilical hernia) is present  Musculoskeletal:      Cervical back: Normal range of motion  Skin:     General: Skin is warm and dry  Neurological:      Mental Status: He is alert         extremities:  No edema

## 2022-03-25 ENCOUNTER — APPOINTMENT (OUTPATIENT)
Dept: LAB | Age: 57
End: 2022-03-25
Payer: COMMERCIAL

## 2022-03-25 ENCOUNTER — OFFICE VISIT (OUTPATIENT)
Dept: LAB | Age: 57
End: 2022-03-25
Payer: COMMERCIAL

## 2022-03-25 DIAGNOSIS — K42.9 UMBILICAL HERNIA: ICD-10-CM

## 2022-03-25 LAB
ANION GAP SERPL CALCULATED.3IONS-SCNC: 3 MMOL/L (ref 4–13)
BUN SERPL-MCNC: 18 MG/DL (ref 5–25)
CALCIUM SERPL-MCNC: 9.2 MG/DL (ref 8.3–10.1)
CHLORIDE SERPL-SCNC: 106 MMOL/L (ref 100–108)
CO2 SERPL-SCNC: 29 MMOL/L (ref 21–32)
CREAT SERPL-MCNC: 1.07 MG/DL (ref 0.6–1.3)
GFR SERPL CREATININE-BSD FRML MDRD: 77 ML/MIN/1.73SQ M
GLUCOSE SERPL-MCNC: 109 MG/DL (ref 65–140)
POTASSIUM SERPL-SCNC: 3.9 MMOL/L (ref 3.5–5.3)
SODIUM SERPL-SCNC: 138 MMOL/L (ref 136–145)

## 2022-03-25 PROCEDURE — 36415 COLL VENOUS BLD VENIPUNCTURE: CPT

## 2022-03-25 PROCEDURE — 80048 BASIC METABOLIC PNL TOTAL CA: CPT

## 2022-03-25 PROCEDURE — 93005 ELECTROCARDIOGRAM TRACING: CPT

## 2022-03-28 ENCOUNTER — ANESTHESIA EVENT (OUTPATIENT)
Dept: PERIOP | Facility: AMBULARY SURGERY CENTER | Age: 57
End: 2022-03-28
Payer: COMMERCIAL

## 2022-03-30 ENCOUNTER — APPOINTMENT (OUTPATIENT)
Dept: LAB | Age: 57
End: 2022-03-30
Payer: COMMERCIAL

## 2022-03-30 LAB
ATRIAL RATE: 69 BPM
ERYTHROCYTE [DISTWIDTH] IN BLOOD BY AUTOMATED COUNT: 12.7 % (ref 11.6–15.1)
HCT VFR BLD AUTO: 42.6 % (ref 36.5–49.3)
HGB BLD-MCNC: 14.4 G/DL (ref 12–17)
MCH RBC QN AUTO: 31 PG (ref 26.8–34.3)
MCHC RBC AUTO-ENTMCNC: 33.8 G/DL (ref 31.4–37.4)
MCV RBC AUTO: 92 FL (ref 82–98)
P AXIS: 20 DEGREES
PLATELET # BLD AUTO: 280 THOUSANDS/UL (ref 149–390)
PMV BLD AUTO: 8.5 FL (ref 8.9–12.7)
PR INTERVAL: 160 MS
QRS AXIS: 6 DEGREES
QRSD INTERVAL: 84 MS
QT INTERVAL: 388 MS
QTC INTERVAL: 415 MS
RBC # BLD AUTO: 4.65 MILLION/UL (ref 3.88–5.62)
T WAVE AXIS: -16 DEGREES
VENTRICULAR RATE: 69 BPM
WBC # BLD AUTO: 6.21 THOUSAND/UL (ref 4.31–10.16)

## 2022-03-30 PROCEDURE — 36415 COLL VENOUS BLD VENIPUNCTURE: CPT

## 2022-03-30 PROCEDURE — 93010 ELECTROCARDIOGRAM REPORT: CPT | Performed by: INTERNAL MEDICINE

## 2022-03-30 PROCEDURE — 85027 COMPLETE CBC AUTOMATED: CPT

## 2022-04-01 NOTE — PRE-PROCEDURE INSTRUCTIONS
Pre-Surgery Instructions:   Medication Instructions    naproxen (NAPROSYN) 500 mg tablet LD 3/28    INSTR ON CARLY CALL,  REPORT LOC , BRING PHOTO ID/MED LIST/INS  INFO ,SHOWER REV , STOP ASA/NSAID/VIT 7 DAY PREOP, PT VERBALIZES UNDERSTANDING W/ NO FURTHER QUESTIONS

## 2022-04-03 RX ORDER — CEFAZOLIN SODIUM 2 G/50ML
2000 SOLUTION INTRAVENOUS ONCE
Status: DISCONTINUED | OUTPATIENT
Start: 2022-04-04 | End: 2022-04-04 | Stop reason: HOSPADM

## 2022-04-04 ENCOUNTER — ANESTHESIA (OUTPATIENT)
Dept: PERIOP | Facility: AMBULARY SURGERY CENTER | Age: 57
End: 2022-04-04
Payer: COMMERCIAL

## 2022-04-04 ENCOUNTER — HOSPITAL ENCOUNTER (OUTPATIENT)
Facility: AMBULARY SURGERY CENTER | Age: 57
Setting detail: OUTPATIENT SURGERY
Discharge: HOME | End: 2022-04-04
Attending: SURGERY | Admitting: SURGERY
Payer: COMMERCIAL

## 2022-04-04 VITALS
WEIGHT: 226.6 LBS | OXYGEN SATURATION: 99 % | RESPIRATION RATE: 16 BRPM | BODY MASS INDEX: 30.69 KG/M2 | SYSTOLIC BLOOD PRESSURE: 140 MMHG | HEART RATE: 65 BPM | HEIGHT: 72 IN | DIASTOLIC BLOOD PRESSURE: 68 MMHG | TEMPERATURE: 97.6 F

## 2022-04-04 DIAGNOSIS — K42.9 UMBILICAL HERNIA WITHOUT OBSTRUCTION AND WITHOUT GANGRENE: Primary | ICD-10-CM

## 2022-04-04 PROCEDURE — 99024 POSTOP FOLLOW-UP VISIT: CPT | Performed by: SURGERY

## 2022-04-04 PROCEDURE — C1781 MESH (IMPLANTABLE): HCPCS | Performed by: SURGERY

## 2022-04-04 PROCEDURE — 49585 PR REPAIR UMBILICAL HERN,5+Y/O,REDUC: CPT | Performed by: SURGERY

## 2022-04-04 DEVICE — VENTRALEX ST HERNIA PATCH
Type: IMPLANTABLE DEVICE | Site: ABDOMEN | Status: FUNCTIONAL
Brand: VENTRALEX ST HERNIA PATCH

## 2022-04-04 RX ORDER — FENTANYL CITRATE 50 UG/ML
INJECTION, SOLUTION INTRAMUSCULAR; INTRAVENOUS AS NEEDED
Status: DISCONTINUED | OUTPATIENT
Start: 2022-04-04 | End: 2022-04-04

## 2022-04-04 RX ORDER — CEFAZOLIN SODIUM 2 G/50ML
SOLUTION INTRAVENOUS AS NEEDED
Status: DISCONTINUED | OUTPATIENT
Start: 2022-04-04 | End: 2022-04-04

## 2022-04-04 RX ORDER — KETOROLAC TROMETHAMINE 30 MG/ML
INJECTION, SOLUTION INTRAMUSCULAR; INTRAVENOUS AS NEEDED
Status: DISCONTINUED | OUTPATIENT
Start: 2022-04-04 | End: 2022-04-04

## 2022-04-04 RX ORDER — MORPHINE SULFATE 4 MG/ML
4 INJECTION, SOLUTION INTRAMUSCULAR; INTRAVENOUS
Status: DISCONTINUED | OUTPATIENT
Start: 2022-04-04 | End: 2022-04-04 | Stop reason: HOSPADM

## 2022-04-04 RX ORDER — MAGNESIUM HYDROXIDE 1200 MG/15ML
LIQUID ORAL AS NEEDED
Status: DISCONTINUED | OUTPATIENT
Start: 2022-04-04 | End: 2022-04-04 | Stop reason: HOSPADM

## 2022-04-04 RX ORDER — ONDANSETRON 2 MG/ML
INJECTION INTRAMUSCULAR; INTRAVENOUS AS NEEDED
Status: DISCONTINUED | OUTPATIENT
Start: 2022-04-04 | End: 2022-04-04

## 2022-04-04 RX ORDER — FENTANYL CITRATE/PF 50 MCG/ML
25 SYRINGE (ML) INJECTION
Status: DISCONTINUED | OUTPATIENT
Start: 2022-04-04 | End: 2022-04-04 | Stop reason: HOSPADM

## 2022-04-04 RX ORDER — ONDANSETRON 2 MG/ML
4 INJECTION INTRAMUSCULAR; INTRAVENOUS ONCE AS NEEDED
Status: DISCONTINUED | OUTPATIENT
Start: 2022-04-04 | End: 2022-04-04 | Stop reason: HOSPADM

## 2022-04-04 RX ORDER — OXYCODONE HYDROCHLORIDE 5 MG/1
5 TABLET ORAL EVERY 4 HOURS PRN
Status: DISCONTINUED | OUTPATIENT
Start: 2022-04-04 | End: 2022-04-04 | Stop reason: HOSPADM

## 2022-04-04 RX ORDER — LIDOCAINE HYDROCHLORIDE 10 MG/ML
INJECTION, SOLUTION EPIDURAL; INFILTRATION; INTRACAUDAL; PERINEURAL AS NEEDED
Status: DISCONTINUED | OUTPATIENT
Start: 2022-04-04 | End: 2022-04-04

## 2022-04-04 RX ORDER — SODIUM CHLORIDE, SODIUM LACTATE, POTASSIUM CHLORIDE, CALCIUM CHLORIDE 600; 310; 30; 20 MG/100ML; MG/100ML; MG/100ML; MG/100ML
125 INJECTION, SOLUTION INTRAVENOUS CONTINUOUS
Status: DISCONTINUED | OUTPATIENT
Start: 2022-04-04 | End: 2022-04-04 | Stop reason: HOSPADM

## 2022-04-04 RX ORDER — ONDANSETRON 2 MG/ML
4 INJECTION INTRAMUSCULAR; INTRAVENOUS EVERY 4 HOURS PRN
Status: DISCONTINUED | OUTPATIENT
Start: 2022-04-04 | End: 2022-04-04 | Stop reason: HOSPADM

## 2022-04-04 RX ORDER — BUPIVACAINE HYDROCHLORIDE 2.5 MG/ML
INJECTION, SOLUTION EPIDURAL; INFILTRATION; INTRACAUDAL AS NEEDED
Status: DISCONTINUED | OUTPATIENT
Start: 2022-04-04 | End: 2022-04-04 | Stop reason: HOSPADM

## 2022-04-04 RX ORDER — MIDAZOLAM HYDROCHLORIDE 2 MG/2ML
INJECTION, SOLUTION INTRAMUSCULAR; INTRAVENOUS AS NEEDED
Status: DISCONTINUED | OUTPATIENT
Start: 2022-04-04 | End: 2022-04-04

## 2022-04-04 RX ORDER — LIDOCAINE HYDROCHLORIDE 10 MG/ML
0.5 INJECTION, SOLUTION EPIDURAL; INFILTRATION; INTRACAUDAL; PERINEURAL ONCE AS NEEDED
Status: DISCONTINUED | OUTPATIENT
Start: 2022-04-04 | End: 2022-04-04 | Stop reason: HOSPADM

## 2022-04-04 RX ORDER — OXYCODONE HYDROCHLORIDE 5 MG/1
5 TABLET ORAL EVERY 4 HOURS PRN
Qty: 10 TABLET | Refills: 0 | Status: SHIPPED | OUTPATIENT
Start: 2022-04-04 | End: 2022-04-14

## 2022-04-04 RX ORDER — DEXAMETHASONE SODIUM PHOSPHATE 10 MG/ML
INJECTION, SOLUTION INTRAMUSCULAR; INTRAVENOUS AS NEEDED
Status: DISCONTINUED | OUTPATIENT
Start: 2022-04-04 | End: 2022-04-04

## 2022-04-04 RX ORDER — PROPOFOL 10 MG/ML
INJECTION, EMULSION INTRAVENOUS AS NEEDED
Status: DISCONTINUED | OUTPATIENT
Start: 2022-04-04 | End: 2022-04-04

## 2022-04-04 RX ADMIN — LIDOCAINE HYDROCHLORIDE 50 MG: 10 INJECTION, SOLUTION EPIDURAL; INFILTRATION; INTRACAUDAL at 11:42

## 2022-04-04 RX ADMIN — SODIUM CHLORIDE, SODIUM LACTATE, POTASSIUM CHLORIDE, AND CALCIUM CHLORIDE: .6; .31; .03; .02 INJECTION, SOLUTION INTRAVENOUS at 11:38

## 2022-04-04 RX ADMIN — MIDAZOLAM HYDROCHLORIDE 2 MG: 1 INJECTION, SOLUTION INTRAMUSCULAR; INTRAVENOUS at 11:38

## 2022-04-04 RX ADMIN — CEFAZOLIN SODIUM 2000 MG: 2 SOLUTION INTRAVENOUS at 11:34

## 2022-04-04 RX ADMIN — PROPOFOL 200 MG: 10 INJECTION, EMULSION INTRAVENOUS at 11:42

## 2022-04-04 RX ADMIN — FENTANYL CITRATE 50 MCG: 50 INJECTION, SOLUTION INTRAMUSCULAR; INTRAVENOUS at 12:00

## 2022-04-04 RX ADMIN — FENTANYL CITRATE 25 MCG: 50 INJECTION INTRAMUSCULAR; INTRAVENOUS at 12:42

## 2022-04-04 RX ADMIN — FENTANYL CITRATE 25 MCG: 50 INJECTION INTRAMUSCULAR; INTRAVENOUS at 12:35

## 2022-04-04 RX ADMIN — FENTANYL CITRATE 25 MCG: 50 INJECTION, SOLUTION INTRAMUSCULAR; INTRAVENOUS at 11:40

## 2022-04-04 RX ADMIN — KETOROLAC TROMETHAMINE 30 MG: 30 INJECTION, SOLUTION INTRAMUSCULAR at 12:07

## 2022-04-04 RX ADMIN — FENTANYL CITRATE 25 MCG: 50 INJECTION, SOLUTION INTRAMUSCULAR; INTRAVENOUS at 11:48

## 2022-04-04 RX ADMIN — DEXAMETHASONE SODIUM PHOSPHATE 10 MG: 10 INJECTION, SOLUTION INTRAMUSCULAR; INTRAVENOUS at 11:50

## 2022-04-04 RX ADMIN — OXYCODONE HYDROCHLORIDE 5 MG: 5 TABLET ORAL at 13:38

## 2022-04-04 RX ADMIN — ONDANSETRON 4 MG: 2 INJECTION INTRAMUSCULAR; INTRAVENOUS at 12:09

## 2022-04-04 NOTE — ANESTHESIA PREPROCEDURE EVALUATION
Procedure:  UMBILICAL HERNIA REPAIR (N/A Abdomen)    Relevant Problems   Other   (+) Snoring        Physical Exam    Airway    Mallampati score: II  TM Distance: >3 FB  Neck ROM: full     Dental   No notable dental hx     Cardiovascular  Rhythm: regular, Rate: normal, Cardiovascular exam normal    Pulmonary  Pulmonary exam normal Breath sounds clear to auscultation,     Other Findings        Anesthesia Plan  ASA Score- 1     Anesthesia Type- general with ASA Monitors  Additional Monitors:   Airway Plan: LMA  Comment: Risks/benefits and alternatives discussed with patient including likely possibility of PONV and sore throat, as well as the rare possibilities of aspiration, dental/oropharyngeal/ocular injuries, or grave/life threatening anesthetic and surgical emergencies          Plan Factors-Exercise tolerance (METS): >4 METS  Patient summary reviewed  Patient instructed to abstain from smoking on day of procedure  Patient did not smoke on day of surgery  Induction- intravenous  Postoperative Plan- Plan for postoperative opioid use  Planned trial extubation    Informed Consent- Anesthetic plan and risks discussed with patient  I personally reviewed this patient with the CRNA  Discussed and agreed on the Anesthesia Plan with the CRNA  Selam Mcfarland

## 2022-04-04 NOTE — OP NOTE
OPERATIVE REPORT  PATIENT NAME: Harley Fitzgerald    :  1965  MRN: [id number]  Pt Location: [facility] 04    SURGERY DATE: 2022    Surgeon(s) and Role:     * Deandre Ulrich, DO - Primary     * Eric Beltran MD - Assisting    Preop Diagnosis:  Umbilical hernia [I70 8]    Post-Op Diagnosis Codes:     * Umbilical hernia [N05 6]    Procedure(s) (LRB):  UMBILICAL HERNIA REPAIR (N/A) with 1 7 in Ventralex patch    Specimen(s):  * No specimens in log *    Estimated Blood Loss:   Minimal    Drains:  * No LDAs found *    Anesthesia Type:   General    Operative Indications:  Umbilical hernia [U62 4]      Operative Findings:  Umbilical hernia  Height 72 in weight 23 kg/226 lb  BMI 31  ASA 1  Wound class 1    Complications:   None    Procedure and Technique:  Patient was brought the operative suite and identified by visualization, conversation, by armband  Sequential compression pumps were placed  He was given Ancef perioperatively  Once under anesthesia abdomen is then prepped and draped in a sterile fashion  Time-out was performed was assured that the prep was dry  Local was instilled about the umbilicus  Curvilinear skin incision was made to the left side of the umbilicus  Underlying subcutaneous tissue divided hot cautery hernia sac was noted this was freed up from the surrounding tissues as well as the underlying fascial defect  This was able to be reduced  1 7 in Ventralex patch was chosen and placed under the fascial defect  Tails were anchored superior inferior edges with 0 Vicryl suture  Tails were trimmed  Irrigation is carried out  0 Vicryl was used to tack the fascia back together  The same 0 Vicryl was used to tack the umbilical skin to the fascia  Some excess skin was trimmed away  0 Vicryl was used to close subcutaneous tissue  Four Monocryl was used to close skin in a subcuticular fashion  Wounds washed and dried  Sterile skin glue was applied    He was awake in the operating returned to the recovery area in stable condition having tolerated the procedure well     I was present for the entire procedure    Patient Disposition:  PACU       SIGNATURE: Catracho Cates DO  DATE: April 4, 2022  TIME: 12:09 PM

## 2022-04-04 NOTE — ANESTHESIA POSTPROCEDURE EVALUATION
Post-Op Assessment Note    CV Status:  Stable  Pain Score: 0    Pain management: adequate     Mental Status:  Alert and awake   Hydration Status:  Euvolemic   PONV Controlled:  Controlled   Airway Patency:  Patent      Post Op Vitals Reviewed: Yes      Staff: Anesthesiologist, CRNA         No complications documented      BP (P) 116/77 (04/04/22 1223)    Temp (P) 97 6 °F (36 4 °C) (04/04/22 1223)    Pulse (P) 62 (04/04/22 1223)   Resp (P) 18 (04/04/22 1223)    SpO2 (P) 99 % (04/04/22 1223)

## 2022-04-04 NOTE — H&P
History and Physical -General Surgical Care   Glory Fitzgerald 55 y o  male MRN: [id number]  Unit/Bed#: OR POOL Encounter: [id number]       Principal Problem:  Umbilical hernia    HPI: Kate Gómez is a 55y o  year old male who presents with umbilical hernia  Please see office note from 2022      Review of Systems    Historical Information   Past Medical History:   Diagnosis Date    Arthritis     Fracture     right leg    Hyperlipidemia     diet controlled    Rotator cuff tear     bilateral    Snoring      Past Surgical History:   Procedure Laterality Date    COLONOSCOPY      HIP ARTHROPLASTY Right     JOINT REPLACEMENT      total r hip    KNEE ARTHROSCOPY Bilateral     l x1 r x2    [place] COLONOSCOPY FLX DX W/COLLJ SPEC WHEN PFRMD N/A 2016    Procedure: COLONOSCOPY;  Surgeon: Ronaldo Carney MD;  [facility];   Service: Gastroenterology    [place] [address] ARTHROSCOP,SURG,W/ROTAT CUFF REPR Right 2018    Procedure: SHOULDER ARTHROSCOPIC ROTATOR CUFF REPAIR;  Surgeon: Sheree Gallegos MD;  [facility];  Service: Orthopedics    [place] WRIST Ebbie Aw LIG Right 2016    Procedure: ENDOSCOPIC CARPAL TUNNEL RELEASE ;  Surgeon: Samantha eJan MD;  [facility];  Service: Orthopedics    ROTATOR CUFF REPAIR      WISDOM TOOTH EXTRACTION       Social History   Social History     Substance and Sexual Activity   Alcohol Use Yes    Comment: socially     Social History     Substance and Sexual Activity   Drug Use No     Social History     Tobacco Use   Smoking Status Former Smoker    Types: Cigarettes    Quit date: 200    Years since quittin 2   Smokeless Tobacco Never Used     Family History   Problem Relation Age of Onset    No Known Problems Family        Meds/Allergies     Medications Prior to Admission   Medication    naproxen (NAPROSYN) 500 mg tablet     Current Facility-Administered Medications   Medication Dose Route Frequency    ceFAZolin (ANCEF) IVPB (premix in dextrose) 2,000 mg 50 mL  2,000 mg Intravenous Once    lactated ringers infusion  125 mL/hr Intravenous Continuous    lidocaine (PF) (XYLOCAINE-MPF) 1 % injection 0 5 mL  0 5 mL Infiltration Once PRN       No Known Allergies        Blood pressure 153/91, pulse 66, temperature (!) 96 9 °F (36 1 °C), temperature source Temporal, resp  rate 18, height 6' (1 829 m), weight 103 kg (226 lb 9 6 oz), SpO2 99 %  No intake or output data in the 24 hours ending 04/04/22 1119    PHYSICAL EXAM  General appearance: alert and oriented, in no acute distress  Lungs: clear to auscultation bilaterally  Heart: regular rate and rhythm, S1, S2 normal, no murmur, click, rub or gallop  Abdomen: soft, non-tender; bowel sounds normal; no masses,  no organomegaly  Soft reducible umbilical hernia  This tends to be more to the left side of the umbilicus  Rectal: deferred  Skin: Skin color, texture, turgor normal  No rashes or lesions    Lab Results:   No visits with results within 1 Day(s) from this visit  Latest known visit with results is:   Office Visit on 03/25/2022   Component Date Value    Ventricular Rate 03/25/2022 69     Atrial Rate 03/25/2022 69     PA Interval 03/25/2022 160     QRSD Interval 03/25/2022 84     QT Interval 03/25/2022 388     QTC Interval 03/25/2022 415     P Axis 03/25/2022 20     QRS Axis 03/25/2022 6     T Wave Axis 03/25/2022 -16      Imaging Studies:     ASSESSMENT:  Umbilical hernia    PLAN:  Risks and benefits of an umbilical hernia repair discussed with him including potential for recurrence or bowel injury agrees to proceed  Counseling / Coordination of Care  Total time spent today  20 minutes  Greater than 50% of total time was spent with the patient and / or family counseling and / or coordination of care

## 2022-05-04 ENCOUNTER — OFFICE VISIT (OUTPATIENT)
Dept: SURGERY | Facility: CLINIC | Age: 57
End: 2022-05-04

## 2022-05-04 DIAGNOSIS — K42.9 UMBILICAL HERNIA WITHOUT OBSTRUCTION AND WITHOUT GANGRENE: Primary | ICD-10-CM

## 2022-05-04 PROCEDURE — 99024 POSTOP FOLLOW-UP VISIT: CPT | Performed by: SURGERY

## 2022-05-04 NOTE — PROGRESS NOTES
Assessment/Plan:    Diagnoses and all orders for this visit:    Umbilical hernia without obstruction and without gangrene    Status post umbilical hernia repair with mesh  Healing quite well  May resume unrestricted activity at this point is 1 month out from surgery  Postoperative restrictions reviewed  All questions answered  ______________________________________________________  HPI: Patient presents post operatively  Umbilical hernia repair 2/1/1685  ROS:  General ROS: negative for - chills, fatigue, fever or night sweats, weight loss  Respiratory ROS: no cough, shortness of breath, or wheezing  Cardiovascular ROS: no chest pain or dyspnea on exertion  Genito-Urinary ROS: no dysuria, trouble voiding, or hematuria  Musculoskeletal ROS: negative for - gait disturbance, joint pain or muscle pain  Neurological ROS: no TIA or stroke symptoms  GI ROS: see HPI  Skin ROS: no new rashes or lesions   Lymphatic ROS: no new adenopathy noted by pt  GYN ROS: see HPI, no new GYN history or bleeding noted  Psy ROS: no new mental or behavioral disturbances         Patient Active Problem List   Diagnosis    Pain in right shoulder    Supraspinatus tendon tear, right, subsequent encounter    Aftercare following surgery of the musculoskeletal system    Umbilical hernia without obstruction and without gangrene    Snoring       Allergies:  Patient has no known allergies        Current Outpatient Medications:     naproxen (NAPROSYN) 500 mg tablet, Take 1 tablet (500 mg total) by mouth 2 (two) times a day with meals (Patient taking differently: Take 500 mg by mouth 2 (two) times a day as needed  ), Disp: 60 tablet, Rfl: 1    Past Medical History:   Diagnosis Date    Arthritis     Fracture     right leg    Hyperlipidemia     diet controlled    Rotator cuff tear     bilateral    Snoring        Past Surgical History:   Procedure Laterality Date    COLONOSCOPY      HIP ARTHROPLASTY Right     JOINT REPLACEMENT      total r hip    KNEE ARTHROSCOPY Bilateral     l x1 r x2    MI COLONOSCOPY FLX DX W/COLLJ SPEC WHEN PFRMD N/A 7/26/2016    Procedure: COLONOSCOPY;  Surgeon: Josué Russell MD;  [facility]; Service: Gastroenterology    MI REPAIR UMBILICAL TIBH,5+B/E,AXGJB N/A 4/4/2022    Procedure: UMBILICAL HERNIA REPAIR;  Surgeon: Darrel Koyanagi, DO;  [facility];  Service: General    MI SHLDR ARTHROSCOP,SURG,W/ROTAT CUFF REPR Right 11/2/2018    Procedure: SHOULDER ARTHROSCOPIC ROTATOR CUFF REPAIR;  Surgeon: Benja Perry MD;  [facility];  Service: Orthopedics    MI WRIST Xochitl Rands LIG Right 12/30/2016    Procedure: ENDOSCOPIC CARPAL TUNNEL RELEASE ;  Surgeon: Milly Alfaro MD;  [facility];  Service: Orthopedics    ROTATOR CUFF REPAIR      WISDOM TOOTH EXTRACTION         Family History   Problem Relation Age of Onset    No Known Problems Family         reports that he quit smoking about 32 years ago  His smoking use included cigarettes  He has never used smokeless tobacco  He reports current alcohol use  He reports that he does not use drugs  PHYSICAL EXAM    There were no vitals taken for this visit      General: normal, cooperative, no distress  Abdominal: soft, nondistended or nontender  Incision: clean, dry, and intact and healing well      Darrel Koyanagi, DO    Date: 5/4/2022 Time: 4:21 PM

## 2022-07-05 ENCOUNTER — APPOINTMENT (EMERGENCY)
Dept: CT IMAGING | Facility: HOSPITAL | Age: 57
DRG: 392 | End: 2022-07-05
Payer: COMMERCIAL

## 2022-07-05 ENCOUNTER — OFFICE VISIT (OUTPATIENT)
Dept: URGENT CARE | Age: 57
End: 2022-07-05
Payer: COMMERCIAL

## 2022-07-05 ENCOUNTER — HOSPITAL ENCOUNTER (INPATIENT)
Facility: HOSPITAL | Age: 57
LOS: 4 days | Discharge: HOME | DRG: 392 | End: 2022-07-09
Attending: EMERGENCY MEDICINE | Admitting: SURGERY
Payer: COMMERCIAL

## 2022-07-05 VITALS
SYSTOLIC BLOOD PRESSURE: 140 MMHG | RESPIRATION RATE: 16 BRPM | TEMPERATURE: 98.7 F | DIASTOLIC BLOOD PRESSURE: 79 MMHG | HEART RATE: 94 BPM | OXYGEN SATURATION: 94 %

## 2022-07-05 DIAGNOSIS — R50.9 FEVER, UNSPECIFIED FEVER CAUSE: Primary | ICD-10-CM

## 2022-07-05 DIAGNOSIS — K57.92 ACUTE DIVERTICULITIS: Primary | ICD-10-CM

## 2022-07-05 LAB
4HR DELTA HS TROPONIN: 0 NG/L
ALBUMIN SERPL BCP-MCNC: 3.9 G/DL (ref 3.5–5)
ALP SERPL-CCNC: 68 U/L (ref 34–104)
ALT SERPL W P-5'-P-CCNC: 17 U/L (ref 7–52)
ANION GAP SERPL CALCULATED.3IONS-SCNC: 9 MMOL/L (ref 4–13)
AST SERPL W P-5'-P-CCNC: 14 U/L (ref 13–39)
BACTERIA UR QL AUTO: ABNORMAL /HPF
BASOPHILS # BLD AUTO: 0.07 THOUSANDS/ΜL (ref 0–0.1)
BASOPHILS NFR BLD AUTO: 1 % (ref 0–1)
BILIRUB SERPL-MCNC: 0.78 MG/DL (ref 0.2–1)
BILIRUB UR QL STRIP: NEGATIVE
BUN SERPL-MCNC: 18 MG/DL (ref 5–25)
CALCIUM SERPL-MCNC: 9.2 MG/DL (ref 8.4–10.2)
CARDIAC TROPONIN I PNL SERPL HS: 6 NG/L
CARDIAC TROPONIN I PNL SERPL HS: 6 NG/L
CHLORIDE SERPL-SCNC: 101 MMOL/L (ref 96–108)
CLARITY UR: CLEAR
CO2 SERPL-SCNC: 26 MMOL/L (ref 21–32)
COLOR UR: YELLOW
CREAT SERPL-MCNC: 1.18 MG/DL (ref 0.6–1.3)
EOSINOPHIL # BLD AUTO: 0.02 THOUSAND/ΜL (ref 0–0.61)
EOSINOPHIL NFR BLD AUTO: 0 % (ref 0–6)
ERYTHROCYTE [DISTWIDTH] IN BLOOD BY AUTOMATED COUNT: 12.7 % (ref 11.6–15.1)
GFR SERPL CREATININE-BSD FRML MDRD: 68 ML/MIN/1.73SQ M
GLUCOSE SERPL-MCNC: 111 MG/DL (ref 65–140)
GLUCOSE UR STRIP-MCNC: NEGATIVE MG/DL
HCT VFR BLD AUTO: 40.4 % (ref 36.5–49.3)
HGB BLD-MCNC: 13.9 G/DL (ref 12–17)
HGB UR QL STRIP.AUTO: ABNORMAL
HOLD SPECIMEN: NORMAL
IMM GRANULOCYTES # BLD AUTO: 0.06 THOUSAND/UL (ref 0–0.2)
IMM GRANULOCYTES NFR BLD AUTO: 0 % (ref 0–2)
KETONES UR STRIP-MCNC: ABNORMAL MG/DL
LACTATE SERPL-SCNC: 0.7 MMOL/L (ref 0.5–2)
LEUKOCYTE ESTERASE UR QL STRIP: NEGATIVE
LIPASE SERPL-CCNC: 8 U/L (ref 11–82)
LYMPHOCYTES # BLD AUTO: 0.93 THOUSANDS/ΜL (ref 0.6–4.47)
LYMPHOCYTES NFR BLD AUTO: 7 % (ref 14–44)
MCH RBC QN AUTO: 32 PG (ref 26.8–34.3)
MCHC RBC AUTO-ENTMCNC: 34.4 G/DL (ref 31.4–37.4)
MCV RBC AUTO: 93 FL (ref 82–98)
MONOCYTES # BLD AUTO: 0.68 THOUSAND/ΜL (ref 0.17–1.22)
MONOCYTES NFR BLD AUTO: 5 % (ref 4–12)
MUCOUS THREADS UR QL AUTO: ABNORMAL
NEUTROPHILS # BLD AUTO: 12.02 THOUSANDS/ΜL (ref 1.85–7.62)
NEUTS SEG NFR BLD AUTO: 87 % (ref 43–75)
NITRITE UR QL STRIP: NEGATIVE
NON-SQ EPI CELLS URNS QL MICRO: ABNORMAL /HPF
NRBC BLD AUTO-RTO: 0 /100 WBCS
PH UR STRIP.AUTO: 6 [PH]
PLATELET # BLD AUTO: 263 THOUSANDS/UL (ref 149–390)
PMV BLD AUTO: 8.3 FL (ref 8.9–12.7)
POTASSIUM SERPL-SCNC: 4 MMOL/L (ref 3.5–5.3)
PROT SERPL-MCNC: 7.5 G/DL (ref 6.4–8.4)
PROT UR STRIP-MCNC: ABNORMAL MG/DL
RBC # BLD AUTO: 4.35 MILLION/UL (ref 3.88–5.62)
RBC #/AREA URNS AUTO: ABNORMAL /HPF
SARS-COV-2 AG UPPER RESP QL IA: NEGATIVE
SODIUM SERPL-SCNC: 136 MMOL/L (ref 135–147)
SP GR UR STRIP.AUTO: 1.02 (ref 1–1.03)
UROBILINOGEN UR QL STRIP.AUTO: 2 E.U./DL
VALID CONTROL: NORMAL
WBC # BLD AUTO: 13.78 THOUSAND/UL (ref 4.31–10.16)
WBC #/AREA URNS AUTO: ABNORMAL /HPF

## 2022-07-05 PROCEDURE — 83690 ASSAY OF LIPASE: CPT | Performed by: EMERGENCY MEDICINE

## 2022-07-05 PROCEDURE — 96375 TX/PRO/DX INJ NEW DRUG ADDON: CPT

## 2022-07-05 PROCEDURE — NC001 PR NO CHARGE: Performed by: SURGERY

## 2022-07-05 PROCEDURE — G1004 CDSM NDSC: HCPCS

## 2022-07-05 PROCEDURE — 80053 COMPREHEN METABOLIC PANEL: CPT | Performed by: EMERGENCY MEDICINE

## 2022-07-05 PROCEDURE — 83605 ASSAY OF LACTIC ACID: CPT

## 2022-07-05 PROCEDURE — 81001 URINALYSIS AUTO W/SCOPE: CPT | Performed by: EMERGENCY MEDICINE

## 2022-07-05 PROCEDURE — 87040 BLOOD CULTURE FOR BACTERIA: CPT

## 2022-07-05 PROCEDURE — 84484 ASSAY OF TROPONIN QUANT: CPT

## 2022-07-05 PROCEDURE — 96365 THER/PROPH/DIAG IV INF INIT: CPT

## 2022-07-05 PROCEDURE — 85025 COMPLETE CBC W/AUTO DIFF WBC: CPT | Performed by: EMERGENCY MEDICINE

## 2022-07-05 PROCEDURE — 99285 EMERGENCY DEPT VISIT HI MDM: CPT | Performed by: EMERGENCY MEDICINE

## 2022-07-05 PROCEDURE — 87811 SARS-COV-2 COVID19 W/OPTIC: CPT | Performed by: NURSE PRACTITIONER

## 2022-07-05 PROCEDURE — G0382 LEV 3 HOSP TYPE B ED VISIT: HCPCS | Performed by: NURSE PRACTITIONER

## 2022-07-05 PROCEDURE — 93005 ELECTROCARDIOGRAM TRACING: CPT

## 2022-07-05 PROCEDURE — 96361 HYDRATE IV INFUSION ADD-ON: CPT

## 2022-07-05 PROCEDURE — 99285 EMERGENCY DEPT VISIT HI MDM: CPT

## 2022-07-05 PROCEDURE — 36415 COLL VENOUS BLD VENIPUNCTURE: CPT

## 2022-07-05 PROCEDURE — 74176 CT ABD & PELVIS W/O CONTRAST: CPT

## 2022-07-05 RX ORDER — OXYCODONE HYDROCHLORIDE 5 MG/1
5 TABLET ORAL EVERY 4 HOURS PRN
Status: DISCONTINUED | OUTPATIENT
Start: 2022-07-05 | End: 2022-07-09 | Stop reason: HOSPADM

## 2022-07-05 RX ORDER — ACETAMINOPHEN 325 MG/1
975 TABLET ORAL EVERY 6 HOURS PRN
Status: DISCONTINUED | OUTPATIENT
Start: 2022-07-05 | End: 2022-07-09 | Stop reason: HOSPADM

## 2022-07-05 RX ORDER — HYDROMORPHONE HCL IN WATER/PF 6 MG/30 ML
0.2 PATIENT CONTROLLED ANALGESIA SYRINGE INTRAVENOUS
Status: DISCONTINUED | OUTPATIENT
Start: 2022-07-05 | End: 2022-07-09 | Stop reason: HOSPADM

## 2022-07-05 RX ORDER — OXYCODONE HYDROCHLORIDE 10 MG/1
10 TABLET ORAL EVERY 4 HOURS PRN
Status: DISCONTINUED | OUTPATIENT
Start: 2022-07-05 | End: 2022-07-09 | Stop reason: HOSPADM

## 2022-07-05 RX ORDER — ONDANSETRON 2 MG/ML
4 INJECTION INTRAMUSCULAR; INTRAVENOUS ONCE
Status: COMPLETED | OUTPATIENT
Start: 2022-07-05 | End: 2022-07-05

## 2022-07-05 RX ORDER — HYDROMORPHONE HCL/PF 1 MG/ML
0.5 SYRINGE (ML) INJECTION ONCE
Status: COMPLETED | OUTPATIENT
Start: 2022-07-05 | End: 2022-07-05

## 2022-07-05 RX ORDER — ONDANSETRON 2 MG/ML
4 INJECTION INTRAMUSCULAR; INTRAVENOUS EVERY 6 HOURS PRN
Status: DISCONTINUED | OUTPATIENT
Start: 2022-07-05 | End: 2022-07-09 | Stop reason: HOSPADM

## 2022-07-05 RX ORDER — ENOXAPARIN SODIUM 100 MG/ML
40 INJECTION SUBCUTANEOUS DAILY
Status: DISCONTINUED | OUTPATIENT
Start: 2022-07-06 | End: 2022-07-09 | Stop reason: HOSPADM

## 2022-07-05 RX ORDER — ACETAMINOPHEN 325 MG/1
975 TABLET ORAL ONCE
Status: COMPLETED | OUTPATIENT
Start: 2022-07-05 | End: 2022-07-05

## 2022-07-05 RX ORDER — SODIUM CHLORIDE, SODIUM LACTATE, POTASSIUM CHLORIDE, CALCIUM CHLORIDE 600; 310; 30; 20 MG/100ML; MG/100ML; MG/100ML; MG/100ML
125 INJECTION, SOLUTION INTRAVENOUS CONTINUOUS
Status: DISCONTINUED | OUTPATIENT
Start: 2022-07-05 | End: 2022-07-06

## 2022-07-05 RX ADMIN — SODIUM CHLORIDE 1000 ML: 0.9 INJECTION, SOLUTION INTRAVENOUS at 17:16

## 2022-07-05 RX ADMIN — ACETAMINOPHEN 975 MG: 325 TABLET ORAL at 20:35

## 2022-07-05 RX ADMIN — ONDANSETRON 4 MG: 2 INJECTION INTRAMUSCULAR; INTRAVENOUS at 17:16

## 2022-07-05 RX ADMIN — SODIUM CHLORIDE 1000 ML: 0.9 INJECTION, SOLUTION INTRAVENOUS at 20:37

## 2022-07-05 RX ADMIN — HYDROMORPHONE HYDROCHLORIDE 0.5 MG: 1 INJECTION, SOLUTION INTRAMUSCULAR; INTRAVENOUS; SUBCUTANEOUS at 17:17

## 2022-07-05 RX ADMIN — SODIUM CHLORIDE, SODIUM LACTATE, POTASSIUM CHLORIDE, AND CALCIUM CHLORIDE 125 ML/HR: .6; .31; .03; .02 INJECTION, SOLUTION INTRAVENOUS at 21:49

## 2022-07-05 RX ADMIN — PIPERACILLIN AND TAZOBACTAM 4.5 G: 36; 4.5 INJECTION, POWDER, FOR SOLUTION INTRAVENOUS at 20:23

## 2022-07-05 NOTE — PROGRESS NOTES
[facility]        NAME: Connor Fletcher is a 65 y o  male  : 1965    MRN: [id number]  DATE: 2022  TIME: 10:02 AM    Assessment and Plan   Fever, unspecified fever cause [R50 9]  1  Fever, unspecified fever cause  Poct Covid 19 Rapid Antigen Test         Patient Instructions     Rapid covid is negative  Follow up with PCP ASAP  Proceed to  ER if symptoms worsen    Chief Complaint     Chief Complaint   Patient presents with    Abdominal Pain     LOWER ABDOMINAL PAIN AND FEVER AS HIGH , SINCE          History of Present Illness       HPI   Reports abdominal pain x 2 days  One diarrheal stool 2 days ago  Fever of 103 degrees x 2 days  None today  Took tylenol for the fever  Also fatigue, loss of appetite and generally not feeling well  Review of Systems   Review of Systems   Constitutional: Positive for appetite change, fatigue and fever  HENT: Negative for sore throat  Respiratory: Negative for shortness of breath  Cardiovascular: Negative for chest pain  Gastrointestinal: Positive for abdominal pain and diarrhea (x1, two days ago)  Negative for blood in stool           Current Medications       Current Outpatient Medications:     naproxen (NAPROSYN) 500 mg tablet, Take 1 tablet (500 mg total) by mouth 2 (two) times a day with meals (Patient taking differently: Take 500 mg by mouth 2 (two) times a day as needed  ), Disp: 60 tablet, Rfl: 1    Current Allergies     Allergies as of 2022    (No Known Allergies)            The following portions of the patient's history were reviewed and updated as appropriate: allergies, current medications, past family history, past medical history, past social history, past surgical history and problem list      Past Medical History:   Diagnosis Date    Arthritis     Fracture     right leg    Hyperlipidemia     diet controlled    Rotator cuff tear     bilateral    Snoring        Past Surgical History:   Procedure Laterality Date    COLONOSCOPY      HIP ARTHROPLASTY Right     JOINT REPLACEMENT      total r hip    KNEE ARTHROSCOPY Bilateral     l x1 r x2    IN COLONOSCOPY FLX DX W/COLLJ SPEC WHEN PFRMD N/A 7/26/2016    Procedure: COLONOSCOPY;  Surgeon: Radha Mireles MD;  [facility]; Service: Gastroenterology    IN REPAIR UMBILICAL FMBD,4+C/S,MCPXN N/A 4/4/2022    Procedure: UMBILICAL HERNIA REPAIR;  Surgeon: Aylin Tobin DO;  [facility];  Service: General    IN SHLDR ARTHROSCOP,SURG,W/ROTAT CUFF REPR Right 11/2/2018    Procedure: SHOULDER ARTHROSCOPIC ROTATOR CUFF REPAIR;  Surgeon: Hao Victor MD;  [facility];  Service: Orthopedics    IN WRIST Domonique Bud LIG Right 12/30/2016    Procedure: ENDOSCOPIC CARPAL TUNNEL RELEASE ;  Surgeon: Cindy Palacio MD;  [facility];  Service: Orthopedics    ROTATOR CUFF REPAIR      WISDOM TOOTH EXTRACTION         Family History   Problem Relation Age of Onset    No Known Problems Family          Medications have been verified  Objective   /79   Pulse 94   Temp 98 7 °F (37 1 °C)   Resp 16   SpO2 94%   No LMP for male patient  Physical Exam     Physical Exam  Constitutional:       Appearance: He is not diaphoretic  HENT:      Right Ear: Tympanic membrane normal       Left Ear: Tympanic membrane normal       Mouth/Throat:      Mouth: Mucous membranes are moist       Pharynx: No posterior oropharyngeal erythema  Cardiovascular:      Rate and Rhythm: Regular rhythm  Pulmonary:      Breath sounds: Normal breath sounds  No wheezing  Abdominal:      General: Bowel sounds are normal       Palpations: Abdomen is soft  Tenderness: There is generalized abdominal tenderness

## 2022-07-05 NOTE — ED PROVIDER NOTES
History  Chief Complaint   Patient presents with    Abdominal Pain     Pt reports abd pain since 1600 Sunday  Fever 103 on Sunday  Pt took 2 amoxil he had for prior to dental procedure Sunday  MAURO Coppola is a 59yoM with h/o HTN and umbilical hernia repair in 4/2022 who presents to the ED with 8/10 constant and intermittently sharp, diffuse, persistent, pressure like abdominal and suprapubic pain that started Sunday  The pain does not radiate to the sides nor the back/shoulders  It is a/w significant fatigue and fever of 103 (taken orally at home) which he has been treating with Tylenol  It is difficult for him to urinate and for him to pass gas  His last BM was yesterday -- formed stool without blood  Reports one episode of watery, small volume diarrhea without blood  Denies hematuria  Loss of appetite but able to tolerate PO and liquids  One episode of nausea, one episode of self-induced vomitting  No diaphoresis, chest pain, or sob  Prior to Admission Medications   Prescriptions Last Dose Informant Patient Reported? Taking?   naproxen (NAPROSYN) 500 mg tablet   No No   Sig: Take 1 tablet (500 mg total) by mouth 2 (two) times a day with meals   Patient taking differently: Take 500 mg by mouth 2 (two) times a day as needed        Facility-Administered Medications: None       Past Medical History:   Diagnosis Date    Arthritis     Fracture     right leg    Hyperlipidemia     diet controlled    Rotator cuff tear     bilateral    Snoring        Past Surgical History:   Procedure Laterality Date    COLONOSCOPY      HIP ARTHROPLASTY Right     JOINT REPLACEMENT      total r hip    KNEE ARTHROSCOPY Bilateral     l x1 r x2    NH COLONOSCOPY FLX DX W/COLLJ SPEC WHEN PFRMD N/A 7/26/2016    Procedure: COLONOSCOPY;  Surgeon: Fausto Painter MD;  [facility];   Service: Gastroenterology    NH REPAIR UMBILICAL RKNV,4+H/X,OQAOZ N/A 4/4/2022    Procedure: UMBILICAL HERNIA REPAIR;  Surgeon: Jc [facility], DO;  [facility]: [facility];  Service: General    CA SHLDR ARTHROSCOP,SURG,W/ROTAT CUFF REPR Right 2018    Procedure: SHOULDER ARTHROSCOPIC ROTATOR CUFF REPAIR;  Surgeon: Dixon Velasquez MD;  [facility];  Service: Orthopedics    CA WRIST [employer] LIG Right 2016    Procedure: ENDOSCOPIC CARPAL TUNNEL RELEASE ;  Surgeon: Johanna Valle MD;  [facility];  Service: Orthopedics    ROTATOR CUFF REPAIR      WISDOM TOOTH EXTRACTION         Family History   Problem Relation Age of Onset    No Known Problems Family      I have reviewed and agree with the history as documented  E-Cigarette/Vaping    E-Cigarette Use Never User      E-Cigarette/Vaping Substances    Nicotine No     THC No     CBD No     Flavoring No     Other No     Unknown No      Social History     Tobacco Use    Smoking status: Former Smoker     Types: Cigarettes     Quit date:      Years since quittin 5    Smokeless tobacco: Never Used   Vaping Use    Vaping Use: Never used   Substance Use Topics    Alcohol use: Yes     Comment: socially    Drug use: No        Review of Systems   Constitutional: Positive for chills, diaphoresis, fatigue and fever  HENT: Negative for postnasal drip and sore throat  Respiratory: Negative for cough and shortness of breath  Cardiovascular: Negative for chest pain, palpitations and leg swelling  Gastrointestinal: Positive for abdominal distention, abdominal pain, diarrhea, nausea and vomiting  Negative for blood in stool and constipation  Genitourinary: Positive for difficulty urinating  Negative for dysuria, flank pain, frequency, hematuria and urgency  Musculoskeletal: Negative for back pain  Neurological: Positive for dizziness  Negative for headaches  All other systems reviewed and are negative        Physical Exam  ED Triage Vitals   Temperature Pulse Respirations Blood Pressure SpO2   22 1504 22 1504 07/05/22 1504 07/05/22 1504 07/05/22 1504   98 3 °F (36 8 °C) 89 18 124/79 98 %      Temp Source Heart Rate Source Patient Position - Orthostatic VS BP Location FiO2 (%)   07/05/22 1504 07/05/22 1504 07/05/22 1504 07/05/22 1504 --   Oral Monitor Sitting Left arm       Pain Score       07/05/22 1717       9             Orthostatic Vital Signs  Vitals:    07/06/22 0009 07/06/22 0027 07/06/22 0748 07/06/22 1548   BP: 126/89 139/82 133/80 150/88   Pulse: 75 69 73 74   Patient Position - Orthostatic VS: Lying Lying         Physical Exam  Vitals and nursing note reviewed  Constitutional:       General: He is not in acute distress  Appearance: He is well-developed  He is ill-appearing  He is not toxic-appearing or diaphoretic  HENT:      Head: Normocephalic and atraumatic  Mouth/Throat:      Mouth: Mucous membranes are moist       Pharynx: Oropharynx is clear  Eyes:      Extraocular Movements: Extraocular movements intact  Pupils: Pupils are equal, round, and reactive to light  Cardiovascular:      Rate and Rhythm: Normal rate and regular rhythm  Heart sounds: Normal heart sounds  No murmur heard  Pulmonary:      Effort: Pulmonary effort is normal  No respiratory distress  Breath sounds: Normal breath sounds  Abdominal:      General: A surgical scar is present  Bowel sounds are decreased  There is no distension or abdominal bruit  Palpations: Abdomen is soft  There is no shifting dullness  Tenderness: There is abdominal tenderness in the right lower quadrant, suprapubic area and left lower quadrant  Hernia: Umbilical: repaired  Skin:     General: Skin is warm and dry  Neurological:      Mental Status: He is alert and oriented to person, place, and time           ED Medications  Medications   ondansetron (ZOFRAN) injection 4 mg (has no administration in time range)   enoxaparin (LOVENOX) subcutaneous injection 40 mg (40 mg Subcutaneous Given 7/6/22 0841)   acetaminophen (TYLENOL) tablet 975 mg (975 mg Oral Given 7/6/22 0841)   oxyCODONE (ROXICODONE) immediate release tablet 10 mg (has no administration in time range)   oxyCODONE (ROXICODONE) IR tablet 5 mg (5 mg Oral Given 7/6/22 0037)   HYDROmorphone HCl (DILAUDID) injection 0 2 mg (has no administration in time range)   piperacillin-tazobactam (ZOSYN) 4 5 g in sodium chloride 0 9 % 100 mL IVPB (4 5 g Intravenous New Bag 7/6/22 1238)   dextrose 5 % and sodium chloride 0 45 % with KCl 20 mEq/L infusion (100 mL/hr Intravenous New Bag 7/6/22 1238)   sodium chloride 0 9 % bolus 1,000 mL (0 mL Intravenous Stopped 7/5/22 1816)   HYDROmorphone (DILAUDID) injection 0 5 mg (0 5 mg Intravenous Given 7/5/22 1717)   ondansetron (ZOFRAN) injection 4 mg (4 mg Intravenous Given 7/5/22 1716)   piperacillin-tazobactam (ZOSYN) 4 5 g in sodium chloride 0 9 % 100 mL IVPB (0 g Intravenous Stopped 7/5/22 2053)   acetaminophen (TYLENOL) tablet 975 mg (975 mg Oral Given 7/5/22 2035)   sodium chloride 0 9 % bolus 1,000 mL (0 mL Intravenous Stopped 7/5/22 2149)       Diagnostic Studies  Results Reviewed     Procedure Component Value Units Date/Time    Basic metabolic panel [[id number]]  (Abnormal) Collected: 07/06/22 0519    Lab Status: Final result Specimen: Blood from Arm, Left Updated: 07/06/22 0557     Sodium 136 mmol/L      Potassium 3 9 mmol/L      Chloride 107 mmol/L      CO2 25 mmol/L      ANION GAP 4 mmol/L      BUN 16 mg/dL      Creatinine 1 02 mg/dL      Glucose 100 mg/dL      Calcium 8 2 mg/dL      eGFR 81 ml/min/1 73sq m     Narrative:      [place] guidelines for Chronic Kidney Disease (CKD):     Stage 1 with normal or high GFR (GFR > 90 mL/min/1 73 square meters)    Stage 2 Mild CKD (GFR = 60-89 mL/min/1 73 square meters)    Stage 3A Moderate CKD (GFR = 45-59 mL/min/1 73 square meters)    Stage 3B Moderate CKD (GFR = 30-44 mL/min/1 73 square meters)    Stage 4 Severe CKD (GFR = 15-29 mL/min/1 73 square meters)    Stage 5 End Stage CKD (GFR <15 mL/min/1 73 square meters)  Note: GFR calculation is accurate only with a steady state creatinine    CBC and differential [[id number]]  (Abnormal) Collected: 07/06/22 0519    Lab Status: Final result Specimen: Blood from Arm, Left Updated: 07/06/22 0537     WBC 10 42 Thousand/uL      RBC 3 73 Million/uL      Hemoglobin 11 7 g/dL      Hematocrit 34 9 %      MCV 94 fL      MCH 31 4 pg      MCHC 33 5 g/dL      RDW 12 6 %      MPV 8 5 fL      Platelets 722 Thousands/uL      nRBC 0 /100 WBCs      Neutrophils Relative 84 %      Immat GRANS % 1 %      Lymphocytes Relative 7 %      Monocytes Relative 6 %      Eosinophils Relative 1 %      Basophils Relative 1 %      Neutrophils Absolute 8 81 Thousands/µL      Immature Grans Absolute 0 09 Thousand/uL      Lymphocytes Absolute 0 77 Thousands/µL      Monocytes Absolute 0 57 Thousand/µL      Eosinophils Absolute 0 11 Thousand/µL      Basophils Absolute 0 07 Thousands/µL     HS Troponin I 4hr [[id number]]  (Normal) Collected: 07/05/22 2031    Lab Status: Final result Specimen: Blood Updated: 07/05/22 2059     hs TnI 4hr 6 ng/L      Delta 4hr hsTnI 0 ng/L     Blood culture #1 [[id number]] Collected: 07/05/22 1725    Lab Status: Preliminary result Specimen: Blood from Arm, Left Updated: 07/05/22 2004     Blood Culture Received in Microbiology Lab  Culture in Progress  Blood culture #2 [[id number]] Collected: 07/05/22 1720    Lab Status: Preliminary result Specimen: Blood from Arm, Right Updated: 07/05/22 2004     Blood Culture Received in Microbiology Lab  Culture in Progress      HS Troponin 0hr (reflex protocol) [[id number]]  (Normal) Collected: 07/05/22 1508    Lab Status: Final result Specimen: Blood from Arm, Right Updated: 07/05/22 1941     hs TnI 0hr 6 ng/L     Urine Microscopic [[id number]]  (Abnormal) Collected: 07/05/22 1731    Lab Status: Final result Specimen: Urine, Clean Catch Updated: 07/05/22 1829     RBC, UA 2-4 /hpf WBC, UA 1-2 /hpf      Epithelial Cells None Seen /hpf      Bacteria, UA Occasional /hpf      MUCUS THREADS Innumerable    Lactic acid, plasma [[id number]]  (Normal) Collected: 07/05/22 1720    Lab Status: Final result Specimen: Blood from Arm, Right Updated: 07/05/22 1759     LACTIC ACID 0 7 mmol/L     Narrative:      Result may be elevated if tourniquet was used during collection  UA w Reflex to Microscopic w Reflex to Culture [[id number]]  (Abnormal) Collected: 07/05/22 1731    Lab Status: Final result Specimen: Urine, Clean Catch Updated: 07/05/22 1759     Color, UA Yellow     Clarity, UA Clear     Specific [place], UA 1 020     pH, UA 6 0     Leukocytes, UA Negative     Nitrite, UA Negative     Protein, UA 30 (1+) mg/dl      Glucose, UA Negative mg/dl      Ketones, UA Trace mg/dl      Urobilinogen, UA 2 0 E U /dl      Bilirubin, UA Negative     Occult Blood, UA Moderate    [place] draw [[id number]] Collected: 07/05/22 1508    Lab Status: Final result Specimen: Blood from Arm, Right Updated: 07/05/22 1704    Narrative: The following orders were created for panel order [place] draw  Procedure                               Abnormality         Status                     ---------                               -----------         ------                     Edger Caller Top on LFTZ[[id number]]                           Final result               Green / Black tube on ENAT[[id number]]                       Final result                 Please view results for these tests on the individual orders      Comprehensive metabolic panel [[id number]] Collected: 07/05/22 1508    Lab Status: Final result Specimen: Blood from Arm, Right Updated: 07/05/22 1622     Sodium 136 mmol/L      Potassium 4 0 mmol/L      Chloride 101 mmol/L      CO2 26 mmol/L      ANION GAP 9 mmol/L      BUN 18 mg/dL      Creatinine 1 18 mg/dL      Glucose 111 mg/dL      Calcium 9 2 mg/dL      AST 14 U/L      ALT 17 U/L      Alkaline Phosphatase 68 U/L Total Protein 7 5 g/dL      Albumin 3 9 g/dL      Total Bilirubin 0 78 mg/dL      eGFR 68 ml/min/1 73sq m     Narrative:      [place] guidelines for Chronic Kidney Disease (CKD):     Stage 1 with normal or high GFR (GFR > 90 mL/min/1 73 square meters)    Stage 2 Mild CKD (GFR = 60-89 mL/min/1 73 square meters)    Stage 3A Moderate CKD (GFR = 45-59 mL/min/1 73 square meters)    Stage 3B Moderate CKD (GFR = 30-44 mL/min/1 73 square meters)    Stage 4 Severe CKD (GFR = 15-29 mL/min/1 73 square meters)    Stage 5 End Stage CKD (GFR <15 mL/min/1 73 square meters)  Note: GFR calculation is accurate only with a steady state creatinine    Lipase [[id number]]  (Abnormal) Collected: 07/05/22 1508    Lab Status: Final result Specimen: Blood from Arm, Right Updated: 07/05/22 1622     Lipase 8 u/L     CBC and differential [[id number]]  (Abnormal) Collected: 07/05/22 1508    Lab Status: Final result Specimen: Blood from Arm, Right Updated: 07/05/22 1521     WBC 13 78 Thousand/uL      RBC 4 35 Million/uL      Hemoglobin 13 9 g/dL      Hematocrit 40 4 %      MCV 93 fL      MCH 32 0 pg      MCHC 34 4 g/dL      RDW 12 7 %      MPV 8 3 fL      Platelets 760 Thousands/uL      nRBC 0 /100 WBCs      Neutrophils Relative 87 %      Immat GRANS % 0 %      Lymphocytes Relative 7 %      Monocytes Relative 5 %      Eosinophils Relative 0 %      Basophils Relative 1 %      Neutrophils Absolute 12 02 Thousands/µL      Immature Grans Absolute 0 06 Thousand/uL      Lymphocytes Absolute 0 93 Thousands/µL      Monocytes Absolute 0 68 Thousand/µL      Eosinophils Absolute 0 02 Thousand/µL      Basophils Absolute 0 07 Thousands/µL                  CT abdomen pelvis wo contrast   Final Result by German Rouse MD (07/05 1943)   Acute sigmoid diverticulitis with adjacent collection exhibiting air-fluid level measuring 2 7 x 3 7 cm concerning for abscess    This collection may also communicate with the adjacent bladder worrisome for fistulization  IV and oral contrast-enhanced    follow-up exam including delayed images for assessment of the bladder is advised  The study was marked in EPIC for significant notification  Workstation performed: [id number]               Procedures  ECG 12 Lead Documentation Only    Date/Time: 7/5/2022 8:45 PM  Performed by: Pete Perry MD  Authorized by: Pete Perry MD     Indications / Diagnosis:  N/v  ECG reviewed by me, the ED Provider: yes    Patient location:  ED  Previous ECG:     Previous ECG:  Compared to current  Interpretation:     Interpretation: non-specific    Quality:     Tracing quality:  Limited by artifact  Rate:     ECG rate:  80    ECG rate assessment: normal    Rhythm:     Rhythm: sinus rhythm    QRS:     QRS intervals:  Normal  ST segments:     ST segments:  Non-specific  T waves:     T waves: non-specific            ED Course  ED Course as of 07/06/22 1634   Tue Jul 05, 2022   2118 D/w Dr Cuong Hernandez, admitting under Dr Delano Caba 20yo+    [address] [facility] Most Recent Value   SBIRT (23 yo +)    In order to provide better care to our patients, we are screening all of our patients for alcohol and drug use  Would it be okay to ask you these screening questions? Unable to answer at this time Filed at: 07/05/2022 1823                MDM  Number of Diagnoses or Management Options  Acute diverticulitis: new and requires workup  Diagnosis management comments: 59yoM diagnosed with acute complicated diverticulitis, CT demonstrates c/f abcess formation and possible fistulization with adjacent bladder wall  Started on IVF and Zosyn  Zofran 4mg and dilaudid 0 5mg x1  Discussed the patient's case with Dr Cuong Hernandez from surgery  Patient to be admitted to surgery service under Dr Babs Fraser         Amount and/or Complexity of Data Reviewed  Clinical lab tests: ordered and reviewed  Tests in the radiology section of CPT®: ordered and reviewed  Review and summarize past medical records: yes  Discuss the patient with other providers: yes  Independent visualization of images, tracings, or specimens: yes    Risk of Complications, Morbidity, and/or Mortality  Presenting problems: moderate  Diagnostic procedures: moderate  Management options: moderate        Disposition  Final diagnoses:   Acute diverticulitis - with abscess     Time reflects when diagnosis was documented in both MDM as applicable and the Disposition within this note     Time User Action Codes Description Comment    7/5/2022  8:50 PM Hal Lacey Add [K57 92] Acute diverticulitis     7/5/2022  8:50 PM Hal Lacey Modify [K57 92] Acute diverticulitis with abscess      ED Disposition     ED Disposition   Admit    Condition   Stable    Date/Time   Tue Jul 5, 2022  9:18 PM    Comment   Case was discussed with Dr Benny Sandhu and the patient's admission status was agreed to be Admission Status: inpatient status to the service of Dr Tiffany Devi   Follow-up Information    None         Current Discharge Medication List      CONTINUE these medications which have NOT CHANGED    Details   naproxen (NAPROSYN) 500 mg tablet Take 1 tablet (500 mg total) by mouth 2 (two) times a day with meals  Qty: 60 tablet, Refills: 1    Associated Diagnoses: Left hip pain; Primary osteoarthritis of one hip, left; Strain of hip flexor, left, initial encounter           No discharge procedures on file  PDMP Review     None           ED Provider  Attending physically available and evaluated Jose Little I managed the patient along with the ED Attending      Electronically Signed by         Monique Tolbert MD  07/05/22 312 FERNANDO Mendez MD  07/05/22 2059 [facility]  07/06/22 2126

## 2022-07-05 NOTE — Clinical Note
Case was discussed with  __ and the patient's admission status was agreed to be Admission Status: inpatient status to the service of   __

## 2022-07-06 PROBLEM — K57.92 DIVERTICULITIS: Status: ACTIVE | Noted: 2022-07-06

## 2022-07-06 LAB
ANION GAP SERPL CALCULATED.3IONS-SCNC: 4 MMOL/L (ref 4–13)
ATRIAL RATE: 81 BPM
BASOPHILS # BLD AUTO: 0.07 THOUSANDS/ΜL (ref 0–0.1)
BASOPHILS NFR BLD AUTO: 1 % (ref 0–1)
BUN SERPL-MCNC: 16 MG/DL (ref 5–25)
CALCIUM SERPL-MCNC: 8.2 MG/DL (ref 8.4–10.2)
CHLORIDE SERPL-SCNC: 107 MMOL/L (ref 96–108)
CO2 SERPL-SCNC: 25 MMOL/L (ref 21–32)
CREAT SERPL-MCNC: 1.02 MG/DL (ref 0.6–1.3)
EOSINOPHIL # BLD AUTO: 0.11 THOUSAND/ΜL (ref 0–0.61)
EOSINOPHIL NFR BLD AUTO: 1 % (ref 0–6)
ERYTHROCYTE [DISTWIDTH] IN BLOOD BY AUTOMATED COUNT: 12.6 % (ref 11.6–15.1)
GFR SERPL CREATININE-BSD FRML MDRD: 81 ML/MIN/1.73SQ M
GLUCOSE SERPL-MCNC: 100 MG/DL (ref 65–140)
HCT VFR BLD AUTO: 34.9 % (ref 36.5–49.3)
HGB BLD-MCNC: 11.7 G/DL (ref 12–17)
IMM GRANULOCYTES # BLD AUTO: 0.09 THOUSAND/UL (ref 0–0.2)
IMM GRANULOCYTES NFR BLD AUTO: 1 % (ref 0–2)
LYMPHOCYTES # BLD AUTO: 0.77 THOUSANDS/ΜL (ref 0.6–4.47)
LYMPHOCYTES NFR BLD AUTO: 7 % (ref 14–44)
MCH RBC QN AUTO: 31.4 PG (ref 26.8–34.3)
MCHC RBC AUTO-ENTMCNC: 33.5 G/DL (ref 31.4–37.4)
MCV RBC AUTO: 94 FL (ref 82–98)
MONOCYTES # BLD AUTO: 0.57 THOUSAND/ΜL (ref 0.17–1.22)
MONOCYTES NFR BLD AUTO: 6 % (ref 4–12)
NEUTROPHILS # BLD AUTO: 8.81 THOUSANDS/ΜL (ref 1.85–7.62)
NEUTS SEG NFR BLD AUTO: 84 % (ref 43–75)
NRBC BLD AUTO-RTO: 0 /100 WBCS
P AXIS: 60 DEGREES
PLATELET # BLD AUTO: 217 THOUSANDS/UL (ref 149–390)
PMV BLD AUTO: 8.5 FL (ref 8.9–12.7)
POTASSIUM SERPL-SCNC: 3.9 MMOL/L (ref 3.5–5.3)
PR INTERVAL: 164 MS
QRS AXIS: 63 DEGREES
QRSD INTERVAL: 80 MS
QT INTERVAL: 350 MS
QTC INTERVAL: 403 MS
RBC # BLD AUTO: 3.73 MILLION/UL (ref 3.88–5.62)
SODIUM SERPL-SCNC: 136 MMOL/L (ref 135–147)
T WAVE AXIS: 39 DEGREES
VENTRICULAR RATE: 80 BPM
WBC # BLD AUTO: 10.42 THOUSAND/UL (ref 4.31–10.16)

## 2022-07-06 PROCEDURE — 80048 BASIC METABOLIC PNL TOTAL CA: CPT | Performed by: STUDENT IN AN ORGANIZED HEALTH CARE EDUCATION/TRAINING PROGRAM

## 2022-07-06 PROCEDURE — 93010 ELECTROCARDIOGRAM REPORT: CPT | Performed by: INTERNAL MEDICINE

## 2022-07-06 PROCEDURE — 99446 NTRPROF PH1/NTRNET/EHR 5-10: CPT | Performed by: INTERNAL MEDICINE

## 2022-07-06 PROCEDURE — 99232 SBSQ HOSP IP/OBS MODERATE 35: CPT | Performed by: SURGERY

## 2022-07-06 PROCEDURE — 85025 COMPLETE CBC W/AUTO DIFF WBC: CPT | Performed by: STUDENT IN AN ORGANIZED HEALTH CARE EDUCATION/TRAINING PROGRAM

## 2022-07-06 RX ORDER — DEXTROSE, SODIUM CHLORIDE, AND POTASSIUM CHLORIDE 5; .45; .15 G/100ML; G/100ML; G/100ML
75 INJECTION INTRAVENOUS CONTINUOUS
Status: DISCONTINUED | OUTPATIENT
Start: 2022-07-06 | End: 2022-07-08

## 2022-07-06 RX ADMIN — SODIUM CHLORIDE, SODIUM LACTATE, POTASSIUM CHLORIDE, AND CALCIUM CHLORIDE 125 ML/HR: .6; .31; .03; .02 INJECTION, SOLUTION INTRAVENOUS at 06:28

## 2022-07-06 RX ADMIN — PIPERACILLIN AND TAZOBACTAM 4.5 G: 4; .5 INJECTION, POWDER, LYOPHILIZED, FOR SOLUTION INTRAVENOUS at 17:25

## 2022-07-06 RX ADMIN — DEXTROSE, SODIUM CHLORIDE, AND POTASSIUM CHLORIDE 100 ML/HR: 5; .45; .15 INJECTION INTRAVENOUS at 12:38

## 2022-07-06 RX ADMIN — ACETAMINOPHEN 975 MG: 325 TABLET ORAL at 19:01

## 2022-07-06 RX ADMIN — ENOXAPARIN SODIUM 40 MG: 40 INJECTION SUBCUTANEOUS at 08:41

## 2022-07-06 RX ADMIN — PIPERACILLIN AND TAZOBACTAM 4.5 G: 4; .5 INJECTION, POWDER, LYOPHILIZED, FOR SOLUTION INTRAVENOUS at 12:38

## 2022-07-06 RX ADMIN — ACETAMINOPHEN 975 MG: 325 TABLET ORAL at 08:41

## 2022-07-06 RX ADMIN — PIPERACILLIN AND TAZOBACTAM 4.5 G: 4; .5 INJECTION, POWDER, LYOPHILIZED, FOR SOLUTION INTRAVENOUS at 02:19

## 2022-07-06 RX ADMIN — OXYCODONE HYDROCHLORIDE 5 MG: 5 TABLET ORAL at 00:37

## 2022-07-06 NOTE — H&P
H&P Exam - General Surgery   Keisha Fitzgerald 65 y o  male MRN: [id number]  Unit/Bed#: ED 17 Encounter: [id number]    Assessment/Plan     Assessment:  65 y o  M who presents with acute complicated sigmoid diverticulitis with concern for associated abscess    7/5 CTAP w/o contrast: Sigmoid diverticular disease w inflammatory change c/f diverticulitis  Adjacent collection w air-fluid level measuring up to 2 7 x 3 7 cm c/f abscess, questionably communicates w the bladder c/f fistulization  Febrile (Tmax 100 9F), VSS  WBC  13 7K    Plan:  Patient with acute complicated diverticulitis, associated abscess, no peritoneal signs on exam  Will attempt conservative management at this time  General surgery admission  NPO  [email or web address]  IV Zosyn  IR consult for consideration of percutaneous drain placement for intra-abdominal abscess  Consider repeat CTAP with rectal contrast to evaluate for colovesical fistula, however low suspicion at this time given no urinary symptoms and fairly unremarkable UA  Prn analgesia/anti-emetics  DVT ppx        History of Present Illness     HPI:  Stepan Ying is a 65 y o  male who is otherwise healthy, presents with abdominal pain, fevers, chills and suppressed appetite for the last 3 days  Patient states his symptoms started suddenly on Sunday afternoon  His abdominal pains is located throughout the lower abdomen, has been dull and is nonradiating  He denies nausea or vomiting, but states he has had little appetite over the last few days  Fevers and chills at home (Tmax 103F)  One episode of diarrhea, but otherwise has had normal nonbloody BMs  He is passing flatus, but does also complain of abdominal bloating  He endorses similar symptoms 8 years ago after getting food poisoning after eating oysters  He denies pneumaturia, fecaluria, foul smelling urine  States he has had somewhat more difficulty with initiation of urination, but this is 2/2 lower abdominal pain       Past surgical history is significant for an umbilical hernia repair with mesh in April of this year with Dr Manish Valdivia  Last colonoscopy was in 2019, which demonstrated polyps and few sigmoid diverticula  Review of Systems   Constitutional: Positive for appetite change (suppressed), chills and fever  HENT: Negative for ear pain and sore throat  Eyes: Negative for pain and visual disturbance  Respiratory: Negative for cough and shortness of breath  Cardiovascular: Negative for chest pain and palpitations  Gastrointestinal: Positive for abdominal pain  Negative for nausea and vomiting  Genitourinary: Negative for dysuria and hematuria  Musculoskeletal: Negative for arthralgias and back pain  Skin: Negative for color change and rash  Neurological: Negative for seizures and syncope  All other systems reviewed and are negative  Historical Information   Past Medical History:   Diagnosis Date    Arthritis     Fracture     right leg    Hyperlipidemia     diet controlled    Rotator cuff tear     bilateral    Snoring      Past Surgical History:   Procedure Laterality Date    COLONOSCOPY      HIP ARTHROPLASTY Right     JOINT REPLACEMENT      total r hip    KNEE ARTHROSCOPY Bilateral     l x1 r x2    CA COLONOSCOPY FLX DX W/COLLJ SPEC WHEN PFRMD N/A 7/26/2016    Procedure: COLONOSCOPY;  Surgeon: Charlie Trinidad MD;  [facility]: [facility];   Service: Gastroenterology    CA REPAIR UMBILICAL AMKW,4+H/C,SUSYR N/A 4/4/2022    Procedure: UMBILICAL HERNIA REPAIR;  Surgeon: Franc Francisco DO;  Location[facility];  Service: General    CA SHLDR ARTHROSCOP,SURG,W/ROTAT CUFF REPR Right 11/2/2018    Procedure: SHOULDER ARTHROSCOPIC ROTATOR CUFF REPAIR;  Surgeon: Zeina Carpenter MD;  Location: [facility];  Service: Orthopedics    CA WRIST Neha Doles LIG Right 12/30/2016    Procedure: ENDOSCOPIC CARPAL TUNNEL RELEASE ;  Surgeon: La Branham MD;  [facility];  Service: Orthopedics    ROTATOR CUFF REPAIR      WISDOM TOOTH EXTRACTION       Social History   Social History     Substance and Sexual Activity   Alcohol Use Yes    Comment: socially     Social History     Substance and Sexual Activity   Drug Use No     Social History     Tobacco Use   Smoking Status Former Smoker    Types: Cigarettes    Quit date: 200    Years since quittin 5   Smokeless Tobacco Never Used     E-Cigarette/Vaping    E-Cigarette Use Never User      E-Cigarette/Vaping Substances    Nicotine No     THC No     CBD No     Flavoring No     Other No     Unknown No      Family History: non-contributory    Meds/Allergies   PTA meds:   Prior to Admission Medications   Prescriptions Last Dose Informant Patient Reported? Taking?   naproxen (NAPROSYN) 500 mg tablet   No No   Sig: Take 1 tablet (500 mg total) by mouth 2 (two) times a day with meals   Patient taking differently: Take 500 mg by mouth 2 (two) times a day as needed        Facility-Administered Medications: None     No Known Allergies    Objective   First Vitals:   Blood Pressure: 124/79 (22 1504)  Pulse: 89 (22 1504)  Temperature: 98 3 °F (36 8 °C) (22 1504)  Temp Source: Oral (22 1504)  Respirations: 18 (22 1504)  SpO2: 98 % (22 1504)    Current Vitals:   Blood Pressure: 148/90 (22)  Pulse: 78 (22)  Temperature: (!) 100 7 °F (38 2 °C) (22)  Temp Source: Oral (22)  Respirations: 16 (22)  SpO2: 98 % (22)      Intake/Output Summary (Last 24 hours) at 2022  Last data filed at 2022 1816  Gross per 24 hour   Intake 1000 ml   Output --   Net 1000 ml       Invasive Devices  Report    Peripheral Intravenous Line  Duration           Peripheral IV 22 Right Antecubital <1 day                Physical Exam  Vitals and nursing note reviewed  Constitutional:       General: He is not in acute distress  Appearance: He is well-developed   He is not ill-appearing  HENT:      Head: Normocephalic and atraumatic  Mouth/Throat:      Mouth: Mucous membranes are moist    Eyes:      Conjunctiva/sclera: Conjunctivae normal    Cardiovascular:      Rate and Rhythm: Normal rate  Pulses: Normal pulses  Pulmonary:      Effort: Pulmonary effort is normal  No respiratory distress  Abdominal:      General: There is no distension  Palpations: Abdomen is soft  Tenderness: There is abdominal tenderness (LLQ, RLA, suprapubic)  There is no guarding or rebound  Musculoskeletal:      Cervical back: Neck supple  Right lower leg: No edema  Left lower leg: No edema  Skin:     General: Skin is warm and dry  Neurological:      Mental Status: He is alert and oriented to person, place, and time  Psychiatric:         Mood and Affect: Mood normal          Behavior: Behavior normal          Lab Results:   CBC:   Lab Results   Component Value Date    WBC 13 78 (H) 07/05/2022    HGB 13 9 07/05/2022    HCT 40 4 07/05/2022    MCV 93 07/05/2022     07/05/2022    MCH 32 0 07/05/2022    MCHC 34 4 07/05/2022    RDW 12 7 07/05/2022    MPV 8 3 (L) 07/05/2022    NRBC 0 07/05/2022   , CMP:   Lab Results   Component Value Date    SODIUM 136 07/05/2022    K 4 0 07/05/2022     07/05/2022    CO2 26 07/05/2022    BUN 18 07/05/2022    CREATININE 1 18 07/05/2022    CALCIUM 9 2 07/05/2022    AST 14 07/05/2022    ALT 17 07/05/2022    ALKPHOS 68 07/05/2022    EGFR 68 07/05/2022       Imaging: I have personally reviewed pertinent reports  EKG, Pathology, and Other Studies: I have personally reviewed pertinent reports  Code Status: Level 1 - Full Code  Advance Directive and Living Will:      Power of :    POLST:      Counseling / Coordination of Care  Total floor / unit time spent today 30 minutes  Greater than 50% of total time was spent with the patient and / or family counseling and / or coordination of care    A description of the counseling / coordination of care: discussion with patient and surgical team

## 2022-07-06 NOTE — ED ATTENDING ATTESTATION
7/5/2022  ILety, , saw and evaluated the patient  I have discussed the patient with the resident/non-physician practitioner and agree with the resident's/non-physician practitioner's findings, Plan of Care, and MDM as documented in the resident's/non-physician practitioner's note, except where noted  All available labs and Radiology studies were reviewed  I was present for key portions of any procedure(s) performed by the resident/non-physician practitioner and I was immediately available to provide assistance  At this point I agree with the current assessment done in the Emergency Department  I have conducted an independent evaluation of this patient a history and physical is as follows:    66 yo M coming into the ED for a 3 day history of lower abdominal pain, fever, chills, fatigue  Also c/o difficulty urinating  T max 103F 2 days ago, today was 101 5F  On physical exam: pt is ill appearing, in moderate pain distress  mucous membranes moist   CTA b/l , heart RRR  Abdomen moderately tender to the LLQ, suprapubic and RLQ  Mild rebound to LLQ  Normal bowel sounds  Neuro intact, gcs 15  Cap refill < 2 sec, skin warm and dry        ED Course  ED Course as of 07/06/22 [address] Jul 05, 2022 2118 D/w Dr Lynn Dennis, admitting under Dr Carla Tineo Time  Procedures

## 2022-07-06 NOTE — PROGRESS NOTES
Progress Note - General Surgery   Keisha Fitzgerald 65 y o  male MRN: [id number]  Unit/Bed#: W -01 Encounter: [id number]    Assessment:  65 y o  M who presents with acute complicated sigmoid diverticulitis with concern for associated abscess     -No fever, no N/V/D   -No bowel movement, only slight passing of flatus  -Minimal pain to do gas  -No chest pain, no SOB  -Patient continues to progress in comparison to when first admitted  Plan:  Clear liquid diet  IV Fluids  IV Zosyn  Trend WBC/Fever curve   No window for IR to perform percutaneous drainage, CT AP w contrast on 7/8  Prn analgesia/anti-emetics  DVT ppx        Subjective/Objective     Subjective: Patient states sharp pain has decreased significantly  Passing flatus but no bowel movement  Patient feels distended  Objective:     Blood pressure 150/88, pulse 74, temperature 99 2 °F (37 3 °C), resp  rate 17, weight 99 9 kg (220 lb 3 8 oz), SpO2 98 %  ,Body mass index is 29 87 kg/m²  Intake/Output Summary (Last 24 hours) at 7/6/2022 1859  Last data filed at 7/6/2022 1227  Gross per 24 hour   Intake 1320 ml   Output --   Net 1320 ml       Invasive Devices  Report    Peripheral Intravenous Line  Duration           Peripheral IV 07/05/22 Right Antecubital 1 day                Physical Exam  Constitutional:       Appearance: Normal appearance  Eyes:      Extraocular Movements: Extraocular movements intact  Pupils: Pupils are equal, round, and reactive to light  Cardiovascular:      Rate and Rhythm: Normal rate  Pulses: Normal pulses  Pulmonary:      Effort: Pulmonary effort is normal       Breath sounds: Normal breath sounds  Abdominal:      Tenderness: There is abdominal tenderness  Comments: Diffusely tender, mainly in the RLQ and LLQ  Musculoskeletal:         General: Normal range of motion  Cervical back: Normal range of motion  Neurological:      General: No focal deficit present        Mental Status: He is alert    Psychiatric:         Mood and Affect: Mood normal             Scheduled Meds:  Current Facility-Administered Medications   Medication Dose Route Frequency Provider Last Rate    acetaminophen  975 mg Oral Q6H PRN Dat Bill, DO      dextrose 5 % and sodium chloride 0 45 % with KCl 20 mEq/L  100 mL/hr Intravenous Continuous Maricarmen Tolentino  mL/hr (07/06/22 1238)    enoxaparin  40 mg Subcutaneous Daily Paradise Felix, DO      HYDROmorphone  0 2 mg Intravenous Q3H PRN Isaiah Felix, DO      ondansetron  4 mg Intravenous Q6H PRN Dat Bill, DO      oxyCODONE  10 mg Oral Q4H PRN Dat Mishra, DO      oxyCODONE  5 mg Oral Q4H PRN Isaiah Felix, DO      piperacillin-tazobactam  4 5 g Intravenous Q6H Isaiah Felix, DO 4 5 g (07/06/22 1725)     Continuous Infusions:dextrose 5 % and sodium chloride 0 45 % with KCl 20 mEq/L, 100 mL/hr, Last Rate: 100 mL/hr (07/06/22 1238)      PRN Meds:   acetaminophen    HYDROmorphone    ondansetron    oxyCODONE    oxyCODONE      Lab, Imaging and other studies:I have personally reviewed pertinent lab results        Results Reviewed     Procedure Component Value Units Date/Time    Basic metabolic panel [[id number]]  (Abnormal) Collected: 07/06/22 0519    Lab Status: Final result Specimen: Blood from Arm, Left Updated: 07/06/22 0557     Sodium 136 mmol/L      Potassium 3 9 mmol/L      Chloride 107 mmol/L      CO2 25 mmol/L      ANION GAP 4 mmol/L      BUN 16 mg/dL      Creatinine 1 02 mg/dL      Glucose 100 mg/dL      Calcium 8 2 mg/dL      eGFR 81 ml/min/1 73sq m     Narrative:      [place] guidelines for Chronic Kidney Disease (CKD):     Stage 1 with normal or high GFR (GFR > 90 mL/min/1 73 square meters)    Stage 2 Mild CKD (GFR = 60-89 mL/min/1 73 square meters)    Stage 3A Moderate CKD (GFR = 45-59 mL/min/1 73 square meters)    Stage 3B Moderate CKD (GFR = 30-44 mL/min/1 73 square meters)   Stage 4 Severe CKD (GFR = 15-29 mL/min/1 73 square meters)    Stage 5 End Stage CKD (GFR <15 mL/min/1 73 square meters)  Note: GFR calculation is accurate only with a steady state creatinine    CBC and differential [[id number]]  (Abnormal) Collected: 07/06/22 0519    Lab Status: Final result Specimen: Blood from Arm, Left Updated: 07/06/22 0537     WBC 10 42 Thousand/uL      RBC 3 73 Million/uL      Hemoglobin 11 7 g/dL      Hematocrit 34 9 %      MCV 94 fL      MCH 31 4 pg      MCHC 33 5 g/dL      RDW 12 6 %      MPV 8 5 fL      Platelets 222 Thousands/uL      nRBC 0 /100 WBCs      Neutrophils Relative 84 %      Immat GRANS % 1 %      Lymphocytes Relative 7 %      Monocytes Relative 6 %      Eosinophils Relative 1 %      Basophils Relative 1 %      Neutrophils Absolute 8 81 Thousands/µL      Immature Grans Absolute 0 09 Thousand/uL      Lymphocytes Absolute 0 77 Thousands/µL      Monocytes Absolute 0 57 Thousand/µL      Eosinophils Absolute 0 11 Thousand/µL      Basophils Absolute 0 07 Thousands/µL     HS Troponin I 4hr [[id number]]  (Normal) Collected: 07/05/22 2031    Lab Status: Final result Specimen: Blood Updated: 07/05/22 2059     hs TnI 4hr 6 ng/L      Delta 4hr hsTnI 0 ng/L     Blood culture #1 [[id number]] Collected: 07/05/22 1725    Lab Status: Preliminary result Specimen: Blood from Arm, Left Updated: 07/05/22 2004     Blood Culture Received in Microbiology Lab  Culture in Progress  Blood culture #2 [[id number]] Collected: 07/05/22 1720    Lab Status: Preliminary result Specimen: Blood from Arm, Right Updated: 07/05/22 2004     Blood Culture Received in Microbiology Lab  Culture in Progress      HS Troponin 0hr (reflex protocol) [[id number]]  (Normal) Collected: 07/05/22 1508    Lab Status: Final result Specimen: Blood from Arm, Right Updated: 07/05/22 1941     hs TnI 0hr 6 ng/L     Urine Microscopic [[id number]]  (Abnormal) Collected: 07/05/22 1731    Lab Status: Final result Specimen: Urine, Clean Catch Updated: 07/05/22 1829     RBC, UA 2-4 /hpf      WBC, UA 1-2 /hpf      Epithelial Cells None Seen /hpf      Bacteria, UA Occasional /hpf      MUCUS THREADS Innumerable    Lactic acid, plasma [[id number]]  (Normal) Collected: 07/05/22 1720    Lab Status: Final result Specimen: Blood from Arm, Right Updated: 07/05/22 1759     LACTIC ACID 0 7 mmol/L     Narrative:      Result may be elevated if tourniquet was used during collection  UA w Reflex to Microscopic w Reflex to Culture [[id number]]  (Abnormal) Collected: 07/05/22 1731    Lab Status: Final result Specimen: Urine, Clean Catch Updated: 07/05/22 1759     Color, UA Yellow     Clarity, UA Clear     Specific [place], UA 1 020     pH, UA 6 0     Leukocytes, UA Negative     Nitrite, UA Negative     Protein, UA 30 (1+) mg/dl      Glucose, UA Negative mg/dl      Ketones, UA Trace mg/dl      Urobilinogen, UA 2 0 E U /dl      Bilirubin, UA Negative     Occult Blood, UA Moderate    [place] draw [[id number]] Collected: 07/05/22 1508    Lab Status: Final result Specimen: Blood from Arm, Right Updated: 07/05/22 1704    Narrative: The following orders were created for panel order [place] draw  Procedure                               Abnormality         Status                     ---------                               -----------         ------                     Leandro Pinks Top on [facility][[id number]]                           Final result               Green / Black tube on HZDU[[id number]]                       Final result                 Please view results for these tests on the individual orders      Comprehensive metabolic panel [[id number]] Collected: 07/05/22 1508    Lab Status: Final result Specimen: Blood from Arm, Right Updated: 07/05/22 1622     Sodium 136 mmol/L      Potassium 4 0 mmol/L      Chloride 101 mmol/L      CO2 26 mmol/L      ANION GAP 9 mmol/L      BUN 18 mg/dL      Creatinine 1 18 mg/dL      Glucose 111 mg/dL      Calcium 9 2 mg/dL      AST 14 U/L ALT 17 U/L      Alkaline Phosphatase 68 U/L      Total Protein 7 5 g/dL      Albumin 3 9 g/dL      Total Bilirubin 0 78 mg/dL      eGFR 68 ml/min/1 73sq m     Narrative:      [place] guidelines for Chronic Kidney Disease (CKD):     Stage 1 with normal or high GFR (GFR > 90 mL/min/1 73 square meters)    Stage 2 Mild CKD (GFR = 60-89 mL/min/1 73 square meters)    Stage 3A Moderate CKD (GFR = 45-59 mL/min/1 73 square meters)    Stage 3B Moderate CKD (GFR = 30-44 mL/min/1 73 square meters)    Stage 4 Severe CKD (GFR = 15-29 mL/min/1 73 square meters)    Stage 5 End Stage CKD (GFR <15 mL/min/1 73 square meters)  Note: GFR calculation is accurate only with a steady state creatinine    Lipase [[id number]]  (Abnormal) Collected: 07/05/22 1508    Lab Status: Final result Specimen: Blood from Arm, Right Updated: 07/05/22 1622     Lipase 8 u/L     CBC and differential [[id number]]  (Abnormal) Collected: 07/05/22 1508    Lab Status: Final result Specimen: Blood from Arm, Right Updated: 07/05/22 1521     WBC 13 78 Thousand/uL      RBC 4 35 Million/uL      Hemoglobin 13 9 g/dL      Hematocrit 40 4 %      MCV 93 fL      MCH 32 0 pg      MCHC 34 4 g/dL      RDW 12 7 %      MPV 8 3 fL      Platelets 406 Thousands/uL      nRBC 0 /100 WBCs      Neutrophils Relative 87 %      Immat GRANS % 0 %      Lymphocytes Relative 7 %      Monocytes Relative 5 %      Eosinophils Relative 0 %      Basophils Relative 1 %      Neutrophils Absolute 12 02 Thousands/µL      Immature Grans Absolute 0 06 Thousand/uL      Lymphocytes Absolute 0 93 Thousands/µL      Monocytes Absolute 0 68 Thousand/µL      Eosinophils Absolute 0 02 Thousand/µL      Basophils Absolute 0 07 Thousands/µL         CT abdomen pelvis wo contrast    Result Date: 7/5/2022  Impression: Acute sigmoid diverticulitis with adjacent collection exhibiting air-fluid level measuring 2 7 x 3 7 cm concerning for abscess    This collection may also communicate with the adjacent bladder worrisome for fistulization  IV and oral contrast-enhanced follow-up exam including delayed images for assessment of the bladder is advised  The study was marked in EPIC for significant notification   Workstation performed: [id number]       VTE Pharmacologic Prophylaxis: Enoxaparin (Lovenox)  VTE Mechanical Prophylaxis: sequential compression device      Stanton Lezama MD  7/6/2022 6:59 PM

## 2022-07-06 NOTE — PROGRESS NOTES
Progress Note - General Surgery   Kalina Fitzgerald 65 y o  male MRN: [id number]  Unit/Bed#: W -01 Encounter: [id number]    Assessment:  65 y o  M who presents with acute complicated sigmoid diverticulitis with concern for associated abscess    Tmax 100 7@ 8:30 pm last night  Vitals stable  WBC 10 4 from 13 7    Plan:  Keep NPO  [email or web address]  IV Zosyn  IR consult for consideration of percutaneous drain placement for intra-abdominal abscess  Trend WBC/Fever curve   Prn analgesia/anti-emetics  DVT ppx     Subjective/Objective     Subjective: No acute events overnight  Patient complains of persistent lower abdominal gas pain  +flatulence/BM  Denies nausea or vomiting  No fevers, chills overnight  Objective:     Blood pressure 139/82, pulse 69, temperature 98 7 °F (37 1 °C), temperature source Oral, resp  rate 16, weight 99 9 kg (220 lb 3 8 oz), SpO2 97 %  ,Body mass index is 29 87 kg/m²  Intake/Output Summary (Last 24 hours) at 7/6/2022 0717  Last data filed at 7/6/2022 0219  Gross per 24 hour   Intake 2200 ml   Output --   Net 2200 ml       Invasive Devices  Report    Peripheral Intravenous Line  Duration           Peripheral IV 07/05/22 Right Antecubital <1 day                Physical Exam:   NAD, alert and oriented x3  Normocephalic, atraumatic  MMM  Norm resp effort on room air  Regular rate  Abd soft, mild distention, tenderness in RLQ, LLQ, suprapubic  No rebound, rigidity or guarding     No calf tenderness or peripheral edema  Skin is warm and dry      Lab, Imaging and other studies:  CBC:   Lab Results   Component Value Date    WBC 10 42 (H) 07/06/2022    HGB 11 7 (L) 07/06/2022    HCT 34 9 (L) 07/06/2022    MCV 94 07/06/2022     07/06/2022    MCH 31 4 07/06/2022    MCHC 33 5 07/06/2022    RDW 12 6 07/06/2022    MPV 8 5 (L) 07/06/2022    NRBC 0 07/06/2022   , CMP:   Lab Results   Component Value Date    SODIUM 136 07/06/2022    K 3 9 07/06/2022     07/06/2022    CO2 25 07/06/2022    BUN 16 07/06/2022    CREATININE 1 02 07/06/2022    CALCIUM 8 2 (L) 07/06/2022    AST 14 07/05/2022    ALT 17 07/05/2022    ALKPHOS 68 07/05/2022    EGFR 81 07/06/2022     VTE Pharmacologic Prophylaxis: Enoxaparin (Lovenox)  VTE Mechanical Prophylaxis: sequential compression device

## 2022-07-06 NOTE — TELEMEDICINE
e-Consult (IPC)  - Interventional Radiology  Keisha Fitzgerald 65 y o  male MRN: [id number]  Unit/Bed#: W -30 Encounter: [id number]          Interventional Radiology has been consulted to evaluate Stepan Ying    We were consulted by surgery concerning this patient with diverticulitis  IP Consult to IR  Consult performed by: Amie Hartman MD  Consult ordered by: Daija Carmona DO        07/06/22    Assessment/Recommendation:   Patient is a 65year-old male with history of HTN, now with abdominal/suprapubic pain  CT of the abdomen and pelvis showed acute sigmoid diverticulitis with a collection in the pelvis measuring up to 3 7 cm  Upon my review, the collection is surrounded by the bladder, colon, loops of small bowel, and blood vessels, with no adequate window available for percutaneous drainage  A CT scan in several days may be considered to see if the collection has increased in size and an adequate window is present for percutaneous drainage  Total time spent in review of data, discussion with requesting provider and rendering advice was 5 minutes  Thank you for allowing Interventional Radiology to participate in the care of Stepan Ying  Please don't hesitate to call or TigerText us with any questions       Amie Hartman MD

## 2022-07-07 LAB
ANION GAP SERPL CALCULATED.3IONS-SCNC: 7 MMOL/L (ref 4–13)
BASOPHILS # BLD AUTO: 0.06 THOUSANDS/ΜL (ref 0–0.1)
BASOPHILS NFR BLD AUTO: 1 % (ref 0–1)
BUN SERPL-MCNC: 10 MG/DL (ref 5–25)
CALCIUM SERPL-MCNC: 8.9 MG/DL (ref 8.4–10.2)
CHLORIDE SERPL-SCNC: 106 MMOL/L (ref 96–108)
CO2 SERPL-SCNC: 25 MMOL/L (ref 21–32)
CREAT SERPL-MCNC: 1.02 MG/DL (ref 0.6–1.3)
EOSINOPHIL # BLD AUTO: 0.18 THOUSAND/ΜL (ref 0–0.61)
EOSINOPHIL NFR BLD AUTO: 3 % (ref 0–6)
ERYTHROCYTE [DISTWIDTH] IN BLOOD BY AUTOMATED COUNT: 12.4 % (ref 11.6–15.1)
GFR SERPL CREATININE-BSD FRML MDRD: 81 ML/MIN/1.73SQ M
GLUCOSE SERPL-MCNC: 116 MG/DL (ref 65–140)
HCT VFR BLD AUTO: 37.3 % (ref 36.5–49.3)
HGB BLD-MCNC: 12.4 G/DL (ref 12–17)
IMM GRANULOCYTES # BLD AUTO: 0.06 THOUSAND/UL (ref 0–0.2)
IMM GRANULOCYTES NFR BLD AUTO: 1 % (ref 0–2)
LYMPHOCYTES # BLD AUTO: 0.84 THOUSANDS/ΜL (ref 0.6–4.47)
LYMPHOCYTES NFR BLD AUTO: 13 % (ref 14–44)
MCH RBC QN AUTO: 31 PG (ref 26.8–34.3)
MCHC RBC AUTO-ENTMCNC: 33.2 G/DL (ref 31.4–37.4)
MCV RBC AUTO: 93 FL (ref 82–98)
MONOCYTES # BLD AUTO: 0.38 THOUSAND/ΜL (ref 0.17–1.22)
MONOCYTES NFR BLD AUTO: 6 % (ref 4–12)
NEUTROPHILS # BLD AUTO: 4.91 THOUSANDS/ΜL (ref 1.85–7.62)
NEUTS SEG NFR BLD AUTO: 76 % (ref 43–75)
NRBC BLD AUTO-RTO: 0 /100 WBCS
PLATELET # BLD AUTO: 270 THOUSANDS/UL (ref 149–390)
PMV BLD AUTO: 8.4 FL (ref 8.9–12.7)
POTASSIUM SERPL-SCNC: 4 MMOL/L (ref 3.5–5.3)
RBC # BLD AUTO: 4 MILLION/UL (ref 3.88–5.62)
SODIUM SERPL-SCNC: 138 MMOL/L (ref 135–147)
WBC # BLD AUTO: 6.43 THOUSAND/UL (ref 4.31–10.16)

## 2022-07-07 PROCEDURE — 80048 BASIC METABOLIC PNL TOTAL CA: CPT | Performed by: STUDENT IN AN ORGANIZED HEALTH CARE EDUCATION/TRAINING PROGRAM

## 2022-07-07 PROCEDURE — 99232 SBSQ HOSP IP/OBS MODERATE 35: CPT | Performed by: SURGERY

## 2022-07-07 PROCEDURE — 85025 COMPLETE CBC W/AUTO DIFF WBC: CPT | Performed by: STUDENT IN AN ORGANIZED HEALTH CARE EDUCATION/TRAINING PROGRAM

## 2022-07-07 RX ADMIN — DEXTROSE, SODIUM CHLORIDE, AND POTASSIUM CHLORIDE 75 ML/HR: 5; .45; .15 INJECTION INTRAVENOUS at 18:09

## 2022-07-07 RX ADMIN — PIPERACILLIN AND TAZOBACTAM 4.5 G: 4; .5 INJECTION, POWDER, LYOPHILIZED, FOR SOLUTION INTRAVENOUS at 12:45

## 2022-07-07 RX ADMIN — PIPERACILLIN AND TAZOBACTAM 4.5 G: 4; .5 INJECTION, POWDER, LYOPHILIZED, FOR SOLUTION INTRAVENOUS at 06:09

## 2022-07-07 RX ADMIN — PIPERACILLIN AND TAZOBACTAM 4.5 G: 4; .5 INJECTION, POWDER, LYOPHILIZED, FOR SOLUTION INTRAVENOUS at 00:23

## 2022-07-07 RX ADMIN — DEXTROSE, SODIUM CHLORIDE, AND POTASSIUM CHLORIDE 100 ML/HR: 5; .45; .15 INJECTION INTRAVENOUS at 03:09

## 2022-07-07 RX ADMIN — PIPERACILLIN AND TAZOBACTAM 4.5 G: 4; .5 INJECTION, POWDER, LYOPHILIZED, FOR SOLUTION INTRAVENOUS at 19:21

## 2022-07-07 RX ADMIN — ENOXAPARIN SODIUM 40 MG: 40 INJECTION SUBCUTANEOUS at 08:43

## 2022-07-07 NOTE — PLAN OF CARE
Problem: Nutrition/Hydration-ADULT  Goal: Nutrient/Hydration intake appropriate for improving, restoring or maintaining nutritional needs  Description: Monitor and assess patient's nutrition/hydration status for malnutrition  Collaborate with interdisciplinary team and initiate plan and interventions as ordered  Monitor patient's weight and dietary intake as ordered or per policy  Utilize nutrition screening tool and intervene as necessary  Determine patient's food preferences and provide high-protein, high-caloric foods as appropriate       INTERVENTIONS:  - Monitor oral intake, urinary output, labs, and treatment plans  - Assess nutrition and hydration status and recommend course of action  - Evaluate amount of meals eaten  - Assist patient with eating if necessary   - Allow adequate time for meals  - Recommend/ encourage appropriate diets, oral nutritional supplements, and vitamin/mineral supplements  - Order, calculate, and assess calorie counts as needed  - Recommend, monitor, and adjust tube feedings and TPN/PPN based on assessed needs  - Assess need for intravenous fluids  - Provide specific nutrition/hydration education as appropriate  - Include patient/family/caregiver in decisions related to nutrition  Outcome: Progressing     Problem: PAIN - ADULT  Goal: Verbalizes/displays adequate comfort level or baseline comfort level  Description: Interventions:  - Encourage patient to monitor pain and request assistance  - Assess pain using appropriate pain scale  - Administer analgesics based on type and severity of pain and evaluate response  - Implement non-pharmacological measures as appropriate and evaluate response  - Consider cultural and social influences on pain and pain management  - Notify physician/advanced practitioner if interventions unsuccessful or patient reports new pain  Outcome: Progressing     Problem: INFECTION - ADULT  Goal: Absence or prevention of progression during hospitalization  Description: INTERVENTIONS:  - Assess and monitor for signs and symptoms of infection  - Monitor lab/diagnostic results  - Monitor all insertion sites, i e  indwelling lines, tubes, and drains  - Monitor endotracheal if appropriate and nasal secretions for changes in amount and color  - [place] appropriate cooling/warming therapies per order  - Administer medications as ordered  - Instruct and encourage patient and family to use good hand hygiene technique  - Identify and instruct in appropriate isolation precautions for identified infection/condition  Outcome: Progressing  Goal: Absence of fever/infection during neutropenic period  Description: INTERVENTIONS:  - Monitor WBC    Outcome: Progressing     Problem: SAFETY ADULT  Goal: Patient will remain free of falls  Description: INTERVENTIONS:  - Educate patient/family on patient safety including physical limitations  - Instruct patient to call for assistance with activity   - Consult OT/PT to assist with strengthening/mobility   - Keep Call bell within reach  - Keep bed low and locked with side rails adjusted as appropriate  - Keep care items and personal belongings within reach  - Initiate and maintain comfort rounds  - Make Fall Risk Sign visible to staff  - Apply yellow socks and bracelet for high fall risk patients  - Consider moving patient to room near nurses station  Outcome: Progressing  Goal: Maintain or return to baseline ADL function  Description: INTERVENTIONS:  -  Assess patient's ability to carry out ADLs; assess patient's baseline for ADL function and identify physical deficits which impact ability to perform ADLs (bathing, care of mouth/teeth, toileting, grooming, dressing, etc )  - Assess/evaluate cause of self-care deficits   - Assess range of motion  - Assess patient's mobility; develop plan if impaired  - Assess patient's need for assistive devices and provide as appropriate  - Encourage maximum independence but intervene and supervise when necessary  - Involve family in performance of ADLs  - Assess for home care needs following discharge   - Consider OT consult to assist with ADL evaluation and planning for discharge  - Provide patient education as appropriate  Outcome: Progressing  Goal: Maintains/Returns to pre admission functional level  Description: INTERVENTIONS:  - Perform BMAT or MOVE assessment daily    - Set and communicate daily mobility goal to care team and patient/family/caregiver  - Collaborate with rehabilitation services on mobility goals if consulted  - Out of bed for toileting  - Record patient progress and toleration of activity level   Outcome: Progressing     Problem: Knowledge Deficit  Goal: Patient/family/caregiver demonstrates understanding of disease process, treatment plan, medications, and discharge instructions  Description: Complete learning assessment and assess knowledge base    Interventions:  - Provide teaching at level of understanding  - Provide teaching via preferred learning methods  Outcome: Progressing     Problem: GASTROINTESTINAL - ADULT  Goal: Minimal or absence of nausea and/or vomiting  Description: INTERVENTIONS:  - Administer IV fluids if ordered to ensure adequate hydration  - Maintain NPO status until nausea and vomiting are resolved  - Nasogastric tube if ordered  - Administer ordered antiemetic medications as needed  - Provide nonpharmacologic comfort measures as appropriate  - Advance diet as tolerated, if ordered  - Consider nutrition services referral to assist patient with adequate nutrition and appropriate food choices  Outcome: Progressing  Goal: Maintains or returns to baseline bowel function  Description: INTERVENTIONS:  - Assess bowel function  - Encourage oral fluids to ensure adequate hydration  - Administer IV fluids if ordered to ensure adequate hydration  - Administer ordered medications as needed  - Encourage mobilization and activity  - Consider nutritional services referral to assist patient with adequate nutrition and appropriate food choices  Outcome: Progressing  Goal: Maintains adequate nutritional intake  Description: INTERVENTIONS:  - Monitor percentage of each meal consumed  - Identify factors contributing to decreased intake, treat as appropriate  - Assist with meals as needed  - Monitor I&O, weight, and lab values if indicated  - Obtain nutrition services referral as needed  Outcome: Progressing  Goal: Establish and maintain optimal ostomy function  Description: INTERVENTIONS:  - Assess bowel function  - Encourage oral fluids to ensure adequate hydration  - Administer IV fluids if ordered to ensure adequate hydration   - Administer ordered medications as needed  - Encourage mobilization and activity  - Nutrition services referral to assist patient with appropriate food choices  - Assess stoma site  - Consider wound care consult   Outcome: Progressing  Goal: Oral mucous membranes remain intact  Description: INTERVENTIONS  - Assess oral mucosa and hygiene practices  - Implement preventative oral hygiene regimen  - Implement oral medicated treatments as ordered  - Initiate Nutrition services referral as needed  Outcome: Progressing     Problem: Potential for Falls  Goal: Patient will remain free of falls  Description: INTERVENTIONS:  - Educate patient/family on patient safety including physical limitations  - Instruct patient to call for assistance with activity   - Consult OT/PT to assist with strengthening/mobility   - Keep Call bell within reach  - Keep bed low and locked with side rails adjusted as appropriate  - Keep care items and personal belongings within reach  - Initiate and maintain comfort rounds  - Apply yellow socks and bracelet for high fall risk patients  - Consider moving patient to room near nurses station  Outcome: Progressing     Problem: MOBILITY - ADULT  Goal: Maintain or return to baseline ADL function  Description: INTERVENTIONS:  -  Assess patient's ability to carry out ADLs; assess patient's baseline for ADL function and identify physical deficits which impact ability to perform ADLs (bathing, care of mouth/teeth, toileting, grooming, dressing, etc )  - Assess/evaluate cause of self-care deficits   - Assess range of motion  - Assess patient's mobility; develop plan if impaired  - Assess patient's need for assistive devices and provide as appropriate  - Encourage maximum independence but intervene and supervise when necessary  - Involve family in performance of ADLs  - Assess for home care needs following discharge   - Consider OT consult to assist with ADL evaluation and planning for discharge  - Provide patient education as appropriate  Outcome: Progressing  Goal: Maintains/Returns to pre admission functional level  Description: INTERVENTIONS:  - Perform BMAT or MOVE assessment daily    - Set and communicate daily mobility goal to care team and patient/family/caregiver     - Collaborate with rehabilitation services on mobility goals if consulted  - Out of bed for toileting  - Record patient progress and toleration of activity level   Outcome: Progressing

## 2022-07-07 NOTE — PLAN OF CARE
Problem: Nutrition/Hydration-ADULT  Goal: Nutrient/Hydration intake appropriate for improving, restoring or maintaining nutritional needs  Description: Monitor and assess patient's nutrition/hydration status for malnutrition  Collaborate with interdisciplinary team and initiate plan and interventions as ordered  Monitor patient's weight and dietary intake as ordered or per policy  Utilize nutrition screening tool and intervene as necessary  Determine patient's food preferences and provide high-protein, high-caloric foods as appropriate       INTERVENTIONS:  - Monitor oral intake, urinary output, labs, and treatment plans  - Assess nutrition and hydration status and recommend course of action  - Evaluate amount of meals eaten  - Assist patient with eating if necessary   - Allow adequate time for meals  - Recommend/ encourage appropriate diets, oral nutritional supplements, and vitamin/mineral supplements  - Order, calculate, and assess calorie counts as needed  - Recommend, monitor, and adjust tube feedings and TPN/PPN based on assessed needs  - Assess need for intravenous fluids  - Provide specific nutrition/hydration education as appropriate  - Include patient/family/caregiver in decisions related to nutrition  Outcome: Progressing     Problem: PAIN - ADULT  Goal: Verbalizes/displays adequate comfort level or baseline comfort level  Description: Interventions:  - Encourage patient to monitor pain and request assistance  - Assess pain using appropriate pain scale  - Administer analgesics based on type and severity of pain and evaluate response  - Implement non-pharmacological measures as appropriate and evaluate response  - Consider cultural and social influences on pain and pain management  - Notify physician/advanced practitioner if interventions unsuccessful or patient reports new pain  Outcome: Progressing     Problem: INFECTION - ADULT  Goal: Absence or prevention of progression during hospitalization  Description: INTERVENTIONS:  - Assess and monitor for signs and symptoms of infection  - Monitor lab/diagnostic results  - Monitor all insertion sites, i e  indwelling lines, tubes, and drains  - Monitor endotracheal if appropriate and nasal secretions for changes in amount and color  - [place] appropriate cooling/warming therapies per order  - Administer medications as ordered  - Instruct and encourage patient and family to use good hand hygiene technique  - Identify and instruct in appropriate isolation precautions for identified infection/condition  Outcome: Progressing  Goal: Absence of fever/infection during neutropenic period  Description: INTERVENTIONS:  - Monitor WBC    Outcome: Progressing     Problem: SAFETY ADULT  Goal: Patient will remain free of falls  Description: INTERVENTIONS:  - Educate patient/family on patient safety including physical limitations  - Instruct patient to call for assistance with activity   - Consult OT/PT to assist with strengthening/mobility   - Keep Call bell within reach  - Keep bed low and locked with side rails adjusted as appropriate  - Keep care items and personal belongings within reach  - Initiate and maintain comfort rounds  - Make Fall Risk Sign visible to staff  - Offer Toileting every  Hours, in advance of need  - Initiate/Maintain alarm  - Obtain necessary fall risk management equipment:   - Apply yellow socks and bracelet for high fall risk patients  - Consider moving patient to room near nurses station  Outcome: Progressing  Goal: Maintain or return to baseline ADL function  Description: INTERVENTIONS:  -  Assess patient's ability to carry out ADLs; assess patient's baseline for ADL function and identify physical deficits which impact ability to perform ADLs (bathing, care of mouth/teeth, toileting, grooming, dressing, etc )  - Assess/evaluate cause of self-care deficits   - Assess range of motion  - Assess patient's mobility; develop plan if impaired  - Assess patient's need for assistive devices and provide as appropriate  - Encourage maximum independence but intervene and supervise when necessary  - Involve family in performance of ADLs  - Assess for home care needs following discharge   - Consider OT consult to assist with ADL evaluation and planning for discharge  - Provide patient education as appropriate  Outcome: Progressing  Goal: Maintains/Returns to pre admission functional level  Description: INTERVENTIONS:  - Perform BMAT or MOVE assessment daily    - Set and communicate daily mobility goal to care team and patient/family/caregiver  - Collaborate with rehabilitation services on mobility goals if consulted  - Perform Range of Motion  times a day  - Reposition patient every  hours    - Dangle patient  times a day  - Stand patient  times a day  - Ambulate patient  times a day  - Out of bed to chair  times a day   - Out of bed for meals  times a day  - Out of bed for toileting  - Record patient progress and toleration of activity level   Outcome: Progressing     Problem: DISCHARGE PLANNING  Goal: Discharge to home or other facility with appropriate resources  Description: INTERVENTIONS:  - Identify barriers to discharge w/patient and caregiver  - Arrange for needed discharge resources and transportation as appropriate  - Identify discharge learning needs (meds, wound care, etc )  - Arrange for interpretive services to assist at discharge as needed  - Refer to Case Management Department for coordinating discharge planning if the patient needs post-hospital services based on physician/advanced practitioner order or complex needs related to functional status, cognitive ability, or social support system  Outcome: Progressing     Problem: Knowledge Deficit  Goal: Patient/family/caregiver demonstrates understanding of disease process, treatment plan, medications, and discharge instructions  Description: Complete learning assessment and assess knowledge base    Interventions:  - Provide teaching at level of understanding  - Provide teaching via preferred learning methods  Outcome: Progressing     Problem: GASTROINTESTINAL - ADULT  Goal: Minimal or absence of nausea and/or vomiting  Description: INTERVENTIONS:  - Administer IV fluids if ordered to ensure adequate hydration  - Maintain NPO status until nausea and vomiting are resolved  - Nasogastric tube if ordered  - Administer ordered antiemetic medications as needed  - Provide nonpharmacologic comfort measures as appropriate  - Advance diet as tolerated, if ordered  - Consider nutrition services referral to assist patient with adequate nutrition and appropriate food choices  Outcome: Progressing  Goal: Maintains or returns to baseline bowel function  Description: INTERVENTIONS:  - Assess bowel function  - Encourage oral fluids to ensure adequate hydration  - Administer IV fluids if ordered to ensure adequate hydration  - Administer ordered medications as needed  - Encourage mobilization and activity  - Consider nutritional services referral to assist patient with adequate nutrition and appropriate food choices  Outcome: Progressing  Goal: Maintains adequate nutritional intake  Description: INTERVENTIONS:  - Monitor percentage of each meal consumed  - Identify factors contributing to decreased intake, treat as appropriate  - Assist with meals as needed  - Monitor I&O, weight, and lab values if indicated  - Obtain nutrition services referral as needed  Outcome: Progressing  Goal: Establish and maintain optimal ostomy function  Description: INTERVENTIONS:  - Assess bowel function  - Encourage oral fluids to ensure adequate hydration  - Administer IV fluids if ordered to ensure adequate hydration   - Administer ordered medications as needed  - Encourage mobilization and activity  - Nutrition services referral to assist patient with appropriate food choices  - Assess stoma site  - Consider wound care consult Outcome: Progressing  Goal: Oral mucous membranes remain intact  Description: INTERVENTIONS  - Assess oral mucosa and hygiene practices  - Implement preventative oral hygiene regimen  - Implement oral medicated treatments as ordered  - Initiate Nutrition services referral as needed  Outcome: Progressing     Problem: Potential for Falls  Goal: Patient will remain free of falls  Description: INTERVENTIONS:  - Educate patient/family on patient safety including physical limitations  - Instruct patient to call for assistance with activity   - Consult OT/PT to assist with strengthening/mobility   - Keep Call bell within reach  - Keep bed low and locked with side rails adjusted as appropriate  - Keep care items and personal belongings within reach  - Initiate and maintain comfort rounds  - Make Fall Risk Sign visible to staff  - Offer Toileting every  Hours, in advance of need  - Initiate/Maintain alarm  - Obtain necessary fall risk management equipment  - Apply yellow socks and bracelet for high fall risk patients  - Consider moving patient to room near nurses station  Outcome: Progressing     Problem: MOBILITY - ADULT  Goal: Maintain or return to baseline ADL function  Description: INTERVENTIONS:  -  Assess patient's ability to carry out ADLs; assess patient's baseline for ADL function and identify physical deficits which impact ability to perform ADLs (bathing, care of mouth/teeth, toileting, grooming, dressing, etc )  - Assess/evaluate cause of self-care deficits   - Assess range of motion  - Assess patient's mobility; develop plan if impaired  - Assess patient's need for assistive devices and provide as appropriate  - Encourage maximum independence but intervene and supervise when necessary  - Involve family in performance of ADLs  - Assess for home care needs following discharge   - Consider OT consult to assist with ADL evaluation and planning for discharge  - Provide patient education as appropriate  Outcome: Progressing  Goal: Maintains/Returns to pre admission functional level  Description: INTERVENTIONS:  - Perform BMAT or MOVE assessment daily    - Set and communicate daily mobility goal to care team and patient/family/caregiver  - Collaborate with rehabilitation services on mobility goals if consulted  - Perform Range of Motion  times a day  - Reposition patient every  hours    - Dangle patient  times a day  - Stand patient  times a day  - Ambulate patient  times a day  - Out of bed to chair  times a day   - Out of bed for meals  times a day  - Out of bed for toileting  - Record patient progress and toleration of activity level   Outcome: Progressing

## 2022-07-08 ENCOUNTER — APPOINTMENT (INPATIENT)
Dept: CT IMAGING | Facility: HOSPITAL | Age: 57
DRG: 392 | End: 2022-07-08
Payer: COMMERCIAL

## 2022-07-08 PROCEDURE — G1004 CDSM NDSC: HCPCS

## 2022-07-08 PROCEDURE — 74176 CT ABD & PELVIS W/O CONTRAST: CPT

## 2022-07-08 PROCEDURE — 99232 SBSQ HOSP IP/OBS MODERATE 35: CPT | Performed by: SURGERY

## 2022-07-08 RX ADMIN — DEXTROSE, SODIUM CHLORIDE, AND POTASSIUM CHLORIDE 75 ML/HR: 5; .45; .15 INJECTION INTRAVENOUS at 11:40

## 2022-07-08 RX ADMIN — PIPERACILLIN AND TAZOBACTAM 4.5 G: 4; .5 INJECTION, POWDER, LYOPHILIZED, FOR SOLUTION INTRAVENOUS at 11:35

## 2022-07-08 RX ADMIN — IOHEXOL 50 ML: 240 INJECTION, SOLUTION INTRATHECAL; INTRAVASCULAR; INTRAVENOUS; ORAL at 01:14

## 2022-07-08 RX ADMIN — PIPERACILLIN AND TAZOBACTAM 4.5 G: 4; .5 INJECTION, POWDER, LYOPHILIZED, FOR SOLUTION INTRAVENOUS at 23:25

## 2022-07-08 RX ADMIN — PIPERACILLIN AND TAZOBACTAM 4.5 G: 4; .5 INJECTION, POWDER, LYOPHILIZED, FOR SOLUTION INTRAVENOUS at 17:00

## 2022-07-08 RX ADMIN — ENOXAPARIN SODIUM 40 MG: 40 INJECTION SUBCUTANEOUS at 09:02

## 2022-07-08 RX ADMIN — PIPERACILLIN AND TAZOBACTAM 4.5 G: 4; .5 INJECTION, POWDER, LYOPHILIZED, FOR SOLUTION INTRAVENOUS at 00:19

## 2022-07-08 RX ADMIN — PIPERACILLIN AND TAZOBACTAM 4.5 G: 4; .5 INJECTION, POWDER, LYOPHILIZED, FOR SOLUTION INTRAVENOUS at 05:59

## 2022-07-08 NOTE — DISCHARGE INSTR - AVS FIRST PAGE
Continue full liquids (including anything that can be put in a ) until you have finished your full course of antibiotics  You may also have toast and crackers  After this, continue a low fiber diet for 3 weeks  (No salads, raw veggies or tough  meats i e  steak, pork chops)  In addition add Benefiber to your diet daily with at least 3 full glass of water or juice  Then you should transition to a high fiber diet to help prevent diverticulitis episodes in the future  Dedicated Bran cereal or Metamucil plus 3 glasses of water daily   (and you can stop Benefiber)  Diverticulitis   WHAT YOU NEED TO KNOW:   Diverticulitis is a condition that causes small pockets along your intestine called diverticula to become inflamed or infected  This is caused by hard bowel movements, food, or bacteria that get stuck in the pockets  DISCHARGE INSTRUCTIONS:   Return to the emergency department if:   You have bowel movement or foul-smelling discharge leaking from your vagina or in your urine  You have severe diarrhea  You urinate less than usual or not at all  You are not able to have a bowel movement  You cannot stop vomiting  You have severe abdominal pain, a fever, and your abdomen is larger than usual     You have new or increased blood in your bowel movements  Call your doctor if:   You have pain when you urinate  Your symptoms get worse or do not go away  You have questions or concerns about your condition or care  Medicines:   Antibiotics  may be given to help treat a bacterial infection  Prescription pain medicine  may be given  Ask your healthcare provider how to take this medicine safely  Some prescription pain medicines contain acetaminophen  Do not take other medicines that contain acetaminophen without talking to your healthcare provider  Too much acetaminophen may cause liver damage  Prescription pain medicine may cause constipation   Ask your healthcare provider how to prevent or treat constipation  Take your medicine as directed  Contact your healthcare provider if you think your medicine is not helping or if you have side effects  Tell him or her if you are allergic to any medicine  Keep a list of the medicines, vitamins, and herbs you take  Include the amounts, and when and why you take them  Bring the list or the pill bottles to follow-up visits  Carry your medicine list with you in case of an emergency  Clear liquid diet:  A clear liquid diet includes any liquids that you can see through  Examples include water, ginger-jose, cranberry or apple juice, frozen fruit ice, or broth  Stay on a clear liquid diet until your symptoms are gone, or as directed  Follow up with your doctor as directed: You may need to return for a colonoscopy  When your symptoms are gone, you may need a low-fat, high-fiber diet to prevent diverticulitis from developing again  Your healthcare provider or dietitian can help you create meal plans  Write down your questions so you remember to ask them during your visits  © Copyright [employer] 2022 Information is for End User's use only and may not be sold, redistributed or otherwise used for commercial purposes  All illustrations and images included in CareNotes® are the copyrighted property of A D A M , Inc  or [facility]  The above information is an  only  It is not intended as medical advice for individual conditions or treatments  Talk to your doctor, nurse or pharmacist before following any medical regimen to see if it is safe and effective for you  Full Liquid Diet   A full liquid diet  includes only liquids and foods that are liquid at room temperature  You may need to follow this diet if you have trouble chewing or swallowing  You may also need to follow this diet if you have a severe intestinal illness or had surgery on your intestine   Your healthcare provider will tell you how long to follow this diet and when to start solid foods  Liquids and foods to include:   Fruit juices or pureed fruit without seeds    Vegetable juices or pureed vegetables in broth     Broth or strained cream soups    Refined and strained cooked cereals thinned with milk or half-and-half creamer    Flavored gelatin without chunks of fruit    Plain ice cream, milk shakes, sherbet, or popsicles    Yogurt, pudding, or soft custard    Milk or liquid nutrition supplements    Coffee, tea, sodas, water, or sports drinks    Butter, margarine, or cream      Low Fiber Diet   WHAT YOU NEED TO KNOW:   A low-fiber diet limits foods that are high in fiber  Fiber is the part of fruits, vegetables, and grains that is not broken down by your body  You may need to follow this diet after surgery on your intestines  You may also need to follow this diet for certain conditions such as Crohn disease or ulcerative colitis  Ask your healthcare provider or dietitian how much fiber you can have each day  DISCHARGE INSTRUCTIONS:   Foods to include:  Read food labels to check the amount of fiber that are found in foods  Some foods that are low in fiber include the following:  Grains:  Choose grains that have less than 2 grams of fiber in each serving   Examples include the following:    Cream of wheat and finely ground grits    Dry cereal made from rice    White bread, white pasta, and white rice    Crackers, bagels, and rolls made from white or refined flour    Other foods:      Canned and well-cooked fruit without skins or seeds, and juice without pulp    Ripe bananas and melons    Canned and well-cooked vegetables without skins or seeds, and vegetable juice    Cow's milk, lactose-free milk, soy milk, and rice milk    Yogurt without nuts, fruit, or granola    Eggs, poultry (such as chicken and turkey), fish, and tender, ground, well-cooked beef    Tofu and smooth peanut butter    Broth and strained soups made of low-fiber foods    Foods to avoid:   Breads, cereals, crackers, and pasta made with whole wheat or whole grains (such as whole oats)    [employer], wild rice, quinoa, kasha, and barley    All fresh fruit with skin, except banana and melons    Dried fruits and fruit juice with pulp    Canned pineapple    Raw vegetables    Nuts, seeds, and popcorn    Beans, nuts, peas, and lentils    Tough meats    Coconut and avocado    High Fiber Diet   WHAT YOU NEED TO KNOW:   A high-fiber diet includes foods that have a high amount of fiber  Fiber is the part of fruits, vegetables, and grains that is not broken down by your body  Fiber keeps your bowel movements regular  Fiber can also help lower your cholesterol level, control blood sugar in people with diabetes, and relieve constipation  Fiber can also help you control your weight because it helps you feel full faster  Most adults should eat 25 to 35 grams of fiber each day  Talk to your dietitian or healthcare provider about the amount of fiber you need  DISCHARGE INSTRUCTIONS:   Good sources of fiber:    Foods with at least 4 grams of fiber per serving:      ? to ½ cup of high-fiber cereal (check the nutrition label on the box)    ½ cup of blackberries or raspberries    4 dried prunes    1 cooked artichoke    ½ cup of cooked legumes, such as lentils, or red, kidney, and fernandes beans    Foods with 1 to 3 grams of fiber per servin slice of whole-wheat, pumpernickel, or rye bread    ½ cup of cooked brown rice    4 whole-wheat crackers    1 cup of oatmeal    ½ cup of cereal with 1 to 3 grams of fiber per serving (check the nutrition label on the box)    1 small piece of fruit, such as an apple, banana, pear, kiwi, or orange    3 dates    ½ cup of canned apricots, fruit cocktail, peaches, or pears    ½ cup of raw or cooked vegetables, such as carrots, cauliflower, cabbage, spinach, squash, or corn    Ways that you can increase fiber in your diet:   Choose brown or wild rice instead of white rice       Use whole wheat flour in recipes instead of white or all-purpose flour  Add beans and peas to casseroles or soups  Choose fresh fruit and vegetables with peels or skins on instead of juices  Other diet guidelines to follow: Add fiber to your diet slowly  You may have abdominal discomfort, bloating, and gas if you add fiber to your diet too quickly  Drink plenty of liquids as you add fiber to your diet  You may have nausea or develop constipation if you do not drink enough water  Ask how much liquid to drink each day and which liquids are best for you

## 2022-07-08 NOTE — PLAN OF CARE
Problem: Nutrition/Hydration-ADULT  Goal: Nutrient/Hydration intake appropriate for improving, restoring or maintaining nutritional needs  Description: Monitor and assess patient's nutrition/hydration status for malnutrition  Collaborate with interdisciplinary team and initiate plan and interventions as ordered  Monitor patient's weight and dietary intake as ordered or per policy  Utilize nutrition screening tool and intervene as necessary  Determine patient's food preferences and provide high-protein, high-caloric foods as appropriate       INTERVENTIONS:  - Monitor oral intake, urinary output, labs, and treatment plans  - Assess nutrition and hydration status and recommend course of action  - Evaluate amount of meals eaten  - Assist patient with eating if necessary   - Allow adequate time for meals  - Recommend/ encourage appropriate diets, oral nutritional supplements, and vitamin/mineral supplements  - Order, calculate, and assess calorie counts as needed  - Recommend, monitor, and adjust tube feedings and TPN/PPN based on assessed needs  - Assess need for intravenous fluids  - Provide specific nutrition/hydration education as appropriate  - Include patient/family/caregiver in decisions related to nutrition  Outcome: Progressing     Problem: PAIN - ADULT  Goal: Verbalizes/displays adequate comfort level or baseline comfort level  Description: Interventions:  - Encourage patient to monitor pain and request assistance  - Assess pain using appropriate pain scale  - Administer analgesics based on type and severity of pain and evaluate response  - Implement non-pharmacological measures as appropriate and evaluate response  - Consider cultural and social influences on pain and pain management  - Notify physician/advanced practitioner if interventions unsuccessful or patient reports new pain  Outcome: Progressing     Problem: INFECTION - ADULT  Goal: Absence or prevention of progression during hospitalization  Description: INTERVENTIONS:  - Assess and monitor for signs and symptoms of infection  - Monitor lab/diagnostic results  - Monitor all insertion sites, i e  indwelling lines, tubes, and drains  - Monitor endotracheal if appropriate and nasal secretions for changes in amount and color  - Varina appropriate cooling/warming therapies per order  - Administer medications as ordered  - Instruct and encourage patient and family to use good hand hygiene technique  - Identify and instruct in appropriate isolation precautions for identified infection/condition  Outcome: Progressing  Goal: Absence of fever/infection during neutropenic period  Description: INTERVENTIONS:  - Monitor WBC    Outcome: Progressing     Problem: SAFETY ADULT  Goal: Patient will remain free of falls  Description: INTERVENTIONS:  - Educate patient/family on patient safety including physical limitations  - Instruct patient to call for assistance with activity   - Consult OT/PT to assist with strengthening/mobility   - Keep Call bell within reach  - Keep bed low and locked with side rails adjusted as appropriate  - Keep care items and personal belongings within reach  - Initiate and maintain comfort rounds  - Make Fall Risk Sign visible to staff  - Apply yellow socks and bracelet for high fall risk patients  - Consider moving patient to room near nurses station  Outcome: Progressing  Goal: Maintain or return to baseline ADL function  Description: INTERVENTIONS:  -  Assess patient's ability to carry out ADLs; assess patient's baseline for ADL function and identify physical deficits which impact ability to perform ADLs (bathing, care of mouth/teeth, toileting, grooming, dressing, etc )  - Assess/evaluate cause of self-care deficits   - Assess range of motion  - Assess patient's mobility; develop plan if impaired  - Assess patient's need for assistive devices and provide as appropriate  - Encourage maximum independence but intervene and supervise when necessary  - Involve family in performance of ADLs  - Assess for home care needs following discharge   - Consider OT consult to assist with ADL evaluation and planning for discharge  - Provide patient education as appropriate  Outcome: Progressing  Goal: Maintains/Returns to pre admission functional level  Description: INTERVENTIONS:  - Perform BMAT or MOVE assessment daily    - Set and communicate daily mobility goal to care team and patient/family/caregiver  - Collaborate with rehabilitation services on mobility goals if consulted  - Out of bed for toileting  - Record patient progress and toleration of activity level   Outcome: Progressing     Problem: DISCHARGE PLANNING  Goal: Discharge to home or other facility with appropriate resources  Description: INTERVENTIONS:  - Identify barriers to discharge w/patient and caregiver  - Arrange for needed discharge resources and transportation as appropriate  - Identify discharge learning needs (meds, wound care, etc )  - Arrange for interpretive services to assist at discharge as needed  - Refer to Case Management Department for coordinating discharge planning if the patient needs post-hospital services based on physician/advanced practitioner order or complex needs related to functional status, cognitive ability, or social support system  Outcome: Progressing     Problem: Knowledge Deficit  Goal: Patient/family/caregiver demonstrates understanding of disease process, treatment plan, medications, and discharge instructions  Description: Complete learning assessment and assess knowledge base    Interventions:  - Provide teaching at level of understanding  - Provide teaching via preferred learning methods  Outcome: Progressing     Problem: GASTROINTESTINAL - ADULT  Goal: Minimal or absence of nausea and/or vomiting  Description: INTERVENTIONS:  - Administer IV fluids if ordered to ensure adequate hydration  - Maintain NPO status until nausea and vomiting are resolved  - Nasogastric tube if ordered  - Administer ordered antiemetic medications as needed  - Provide nonpharmacologic comfort measures as appropriate  - Advance diet as tolerated, if ordered  - Consider nutrition services referral to assist patient with adequate nutrition and appropriate food choices  Outcome: Progressing  Goal: Maintains or returns to baseline bowel function  Description: INTERVENTIONS:  - Assess bowel function  - Encourage oral fluids to ensure adequate hydration  - Administer IV fluids if ordered to ensure adequate hydration  - Administer ordered medications as needed  - Encourage mobilization and activity  - Consider nutritional services referral to assist patient with adequate nutrition and appropriate food choices  Outcome: Progressing  Goal: Maintains adequate nutritional intake  Description: INTERVENTIONS:  - Monitor percentage of each meal consumed  - Identify factors contributing to decreased intake, treat as appropriate  - Assist with meals as needed  - Monitor I&O, weight, and lab values if indicated  - Obtain nutrition services referral as needed  Outcome: Progressing  Goal: Establish and maintain optimal ostomy function  Description: INTERVENTIONS:  - Assess bowel function  - Encourage oral fluids to ensure adequate hydration  - Administer IV fluids if ordered to ensure adequate hydration   - Administer ordered medications as needed  - Encourage mobilization and activity  - Nutrition services referral to assist patient with appropriate food choices  - Assess stoma site  - Consider wound care consult   Outcome: Progressing  Goal: Oral mucous membranes remain intact  Description: INTERVENTIONS  - Assess oral mucosa and hygiene practices  - Implement preventative oral hygiene regimen  - Implement oral medicated treatments as ordered  - Initiate Nutrition services referral as needed  Outcome: Progressing     Problem: Potential for Falls  Goal: Patient will remain free of falls  Description: INTERVENTIONS:  - Educate patient/family on patient safety including physical limitations  - Instruct patient to call for assistance with activity   - Consult OT/PT to assist with strengthening/mobility   - Keep Call bell within reach  - Keep bed low and locked with side rails adjusted as appropriate  - Keep care items and personal belongings within reach  - Initiate and maintain comfort rounds  - Make Fall Risk Sign visible to staff  - Apply yellow socks and bracelet for high fall risk patients  - Consider moving patient to room near nurses station  Outcome: Progressing     Problem: MOBILITY - ADULT  Goal: Maintain or return to baseline ADL function  Description: INTERVENTIONS:  -  Assess patient's ability to carry out ADLs; assess patient's baseline for ADL function and identify physical deficits which impact ability to perform ADLs (bathing, care of mouth/teeth, toileting, grooming, dressing, etc )  - Assess/evaluate cause of self-care deficits   - Assess range of motion  - Assess patient's mobility; develop plan if impaired  - Assess patient's need for assistive devices and provide as appropriate  - Encourage maximum independence but intervene and supervise when necessary  - Involve family in performance of ADLs  - Assess for home care needs following discharge   - Consider OT consult to assist with ADL evaluation and planning for discharge  - Provide patient education as appropriate  Outcome: Progressing  Goal: Maintains/Returns to pre admission functional level  Description: INTERVENTIONS:  - Perform BMAT or MOVE assessment daily    - Set and communicate daily mobility goal to care team and patient/family/caregiver     - Collaborate with rehabilitation services on mobility goals if consulted  - Out of bed for toileting  - Record patient progress and toleration of activity level   Outcome: Progressing

## 2022-07-08 NOTE — UTILIZATION REVIEW
Continued Stay Review    Date: 7/8/22                        Current Patient Class: IP  Current Level of Care:  MS    HPI:57 y o  male initially admitted on 7/5/22 with diverticulitis and abscess formation   7/6 -IR consult -collection is surrounded by the bladder, colon, loops of small bowel, and blood vessels, with no adequate window available for percutaneous drainage  Plan - continue Iv abx and re scan later in week to see if the collection has increased in size and an adequate window is present for percutaneous drainage  Assessment/Plan:  7/8  Pt had CT A/P today that showed same abscess , no enlargement  No contrast in bladder   IVF continues and IV Zosyn   Pt afebrile, no N/V  Passing flatus, watery bm x4 this am  Pt states sharp pain has decreased, abdomen soft, tender in bilat lower quads   Pt tolerated  CL diet, toast and crax  Advance to [facility] today    Vital Signs:   Date/Time Temp Pulse Resp BP MAP (mmHg) SpO2   07/08/22 08:52:30 -- -- -- 143/97 112 --   07/08/22 07:54:23 98 3 °F (36 8 °C) -- 19 169/102 Abnormal  124 --   07/07/22 2055 98 8 °F (37 1 °C) -- -- 149/94 -- --   07/07/22 15:44:13 98 °F (36 7 °C) -- -- 154/97 116 --   07/07/22 08:28:27 97 4 °F (36 3 °C) Abnormal  66 18 145/89 108 98 %         Pertinent Labs/Diagnostic Results:   7/8 CT A/P - 1  Redemonstration of acute sigmoid diverticulitis with adjacent air-fluid collection concerning for abscess  The sigmoid wall thickening and adjacent inflammatory changes appear mildly improved from recent CT while the air-fluid collection is stable in size  Inflammatory changes and a questionable fistula extend inferiorly from this collection towards the posterior dome of the bladder, however, no enteric contrast or air is present within the bladder lumen to confirm communication    This can be further evaluated utilizing a CT cystogram      2   Circumferential wall thickening of the urinary bladder and appendix, both favored to represent reactive inflammatory changes given their proximity to the findings described in impression #1          Results from last 7 days   Lab Units 07/07/22  0548 07/06/22  0519 07/05/22  1508   WBC Thousand/uL 6 43 10 42* 13 78*   HEMOGLOBIN g/dL 12 4 11 7* 13 9   HEMATOCRIT % 37 3 34 9* 40 4   PLATELETS Thousands/uL 270 217 263   NEUTROS ABS Thousands/µL 4 91 8 81* 12 02*         Results from last 7 days   Lab Units 07/07/22  0548 07/06/22  0519 07/05/22  1508   SODIUM mmol/L 138 136 136   POTASSIUM mmol/L 4 0 3 9 4 0   CHLORIDE mmol/L 106 107 101   CO2 mmol/L 25 25 26   ANION GAP mmol/L 7 4 9   BUN mg/dL 10 16 18   CREATININE mg/dL 1 02 1 02 1 18   EGFR ml/min/1 73sq m 81 81 68   CALCIUM mg/dL 8 9 8 2* 9 2     Results from last 7 days   Lab Units 07/05/22  1508   AST U/L 14   ALT U/L 17   ALK PHOS U/L 68   TOTAL PROTEIN g/dL 7 5   ALBUMIN g/dL 3 9   TOTAL BILIRUBIN mg/dL 0 78         Results from last 7 days   Lab Units 07/07/22  0548 07/06/22  0519 07/05/22  1508   GLUCOSE RANDOM mg/dL 116 100 111               Results from last 7 days   Lab Units 07/05/22  2031 07/05/22  1508   HS TNI 0HR ng/L  --  6   HS TNI 4HR ng/L 6  --    HSTNI D4 ng/L 0  --                      Results from last 7 days   Lab Units 07/05/22  1720   LACTIC ACID mmol/L 0 7                         Results from last 7 days   Lab Units 07/05/22  1508   LIPASE u/L 8*                 Results from last 7 days   Lab Units 07/05/22  1731   CLARITY UA  Clear   COLOR UA  Yellow   SPEC GRAV UA  1 020   PH UA  6 0   GLUCOSE UA mg/dl Negative   KETONES UA mg/dl Trace*   BLOOD UA  Moderate*   PROTEIN UA mg/dl 30 (1+)*   NITRITE UA  Negative   BILIRUBIN UA  Negative   UROBILINOGEN UA E U /dl 2 0*   LEUKOCYTES UA  Negative   WBC UA /hpf 1-2   RBC UA /hpf 2-4   BACTERIA UA /hpf Occasional   EPITHELIAL CELLS WET PREP /hpf None Seen   MUCUS THREADS  Innumerable*                                 Results from last 7 days   Lab Units 07/05/22  1725 07/05/22  1720   BLOOD CULTURE No Growth at 48 hrs  No Growth at 48 hrs  Medications:   Scheduled Medications:  enoxaparin, 40 mg, Subcutaneous, Daily  piperacillin-tazobactam, 4 5 g, Intravenous, Q6H      Continuous IV Infusions:  dextrose 5 % and sodium chloride 0 45 % with KCl 20 mEq/L, 75 mL/hr, Intravenous, Continuous      PRN Meds:  acetaminophen, 975 mg, Oral, Q6H PRN  HYDROmorphone, 0 2 mg, Intravenous, Q3H PRN  ondansetron, 4 mg, Intravenous, Q6H PRN  oxyCODONE, 10 mg, Oral, Q4H PRN  oxyCODONE, 5 mg, Oral, Q4H PRN        Discharge Plan: D    Network Utilization Review Department  ATTENTION: Please call with any questions or concerns to [phone] and carefully listen to the prompts so that you are directed to the right person  All voicemails are confidential   Shivani Joya all requests for admission clinical reviews, approved or denied determinations and any other requests to dedicated fax number below belonging to the campus where the patient is receiving treatment   List of dedicated fax numbers for the Facilities:  [address] DENIALS (Administrative/Medical Necessity) [phone]   [address] (Maternity/NICU/Pediatrics) [phone]   [address]  [address] [facility] [place] [phone] [place] [address] [address]ie [address] [facility] [facility] [phone]

## 2022-07-08 NOTE — PROGRESS NOTES
Progress Note - General Surgery   Azeem Fitzgerald 65 y o  male MRN: [id number]  Unit/Bed#: W -01 Encounter: [id number]    Assessment:  Patient is a 65 y o  male with diverticulitis and abscess  Stable on repeat CT 7/8  Plan:   DC today   Continue Fulls t/c   Continue antibiotics transition to p o  On discharge   Please [employer] on call [facility] Surgery Role with any questions     Subjective/Objective     Subjective:   No acute events overnight  Pain controlled  With flatus and bowel movements  Comfortable with discharge today  Pertinent review of systems as above  All other review of systems negative  Objective:    Blood pressure (!) 155/101, pulse 59, temperature 98 °F (36 7 °C), resp  rate 18, height 6' (1 829 m), weight 99 9 kg (220 lb 3 8 oz), SpO2 97 %  ,Body mass index is 29 87 kg/m²  Intake/Output Summary (Last 24 hours) at 7/9/2022 9649  Last data filed at 7/8/2022 2355  Gross per 24 hour   Intake 2023 ml   Output --   Net 2023 ml       Invasive Devices  Report    Peripheral Intravenous Line  Duration           Peripheral IV 07/06/22 Distal;Right;Ventral (anterior) Forearm 2 days                Physical Exam:   Gen:  NAD  HEENT: NCAT  MMM  CV: well perfused  Lungs: Normal respiratory effort  Abd: soft, nt/nd,  Skin: warm/ dry  Extremities: no peripheral edema, no clubbing or cyanosis  Neuro: AxO x3      Results from last 7 days   Lab Units 07/07/22  0548 07/06/22  0519 07/05/22  1508   WBC Thousand/uL 6 43 10 42* 13 78*   HEMOGLOBIN g/dL 12 4 11 7* 13 9   HEMATOCRIT % 37 3 34 9* 40 4   PLATELETS Thousands/uL 270 217 263     Results from last 7 days   Lab Units 07/07/22  0548 07/06/22  0519 07/05/22  1508   POTASSIUM mmol/L 4 0 3 9 4 0   CHLORIDE mmol/L 106 107 101   CO2 mmol/L 25 25 26   BUN mg/dL 10 16 18   CREATININE mg/dL 1 02 1 02 1 18   CALCIUM mg/dL 8 9 8 2* 9 2            I have personally reviewed pertinent films in PACS      Medications:   Scheduled Meds:  Current Facility-Administered Medications   Medication Dose Route Frequency Provider Last Rate    acetaminophen  975 mg Oral Q6H PRN Caleb Stapleton, DO      enoxaparin  40 mg Subcutaneous Daily Caleb Stapleton, DO      HYDROmorphone  0 2 mg Intravenous Q3H PRN Carlos Felix, DO      ondansetron  4 mg Intravenous Q6H PRN Caleb Stapleton, DO      oxyCODONE  10 mg Oral Q4H PRN Caleb Stapleton, DO      oxyCODONE  5 mg Oral Q4H PRN Caleb Stapleton, DO      piperacillin-tazobactam  4 5 g Intravenous Q6H Carlos Felix, DO 4 5 g (07/09/22 6690)     Continuous Infusions:   PRN Meds:  acetaminophen, 975 mg, Q6H PRN  HYDROmorphone, 0 2 mg, Q3H PRN  ondansetron, 4 mg, Q6H PRN  oxyCODONE, 10 mg, Q4H PRN  oxyCODONE, 5 mg, Q4H PRN      VTE Pharmacologic Prophylaxis:lovenox  VTE Mechanical Prophylaxis: sequential compression device

## 2022-07-08 NOTE — PLAN OF CARE
Problem: Nutrition/Hydration-ADULT  Goal: Nutrient/Hydration intake appropriate for improving, restoring or maintaining nutritional needs  Description: Monitor and assess patient's nutrition/hydration status for malnutrition  Collaborate with interdisciplinary team and initiate plan and interventions as ordered  Monitor patient's weight and dietary intake as ordered or per policy  Utilize nutrition screening tool and intervene as necessary  Determine patient's food preferences and provide high-protein, high-caloric foods as appropriate       INTERVENTIONS:  - Monitor oral intake, urinary output, labs, and treatment plans  - Assess nutrition and hydration status and recommend course of action  - Evaluate amount of meals eaten  - Assist patient with eating if necessary   - Allow adequate time for meals  - Recommend/ encourage appropriate diets, oral nutritional supplements, and vitamin/mineral supplements  - Order, calculate, and assess calorie counts as needed  - Recommend, monitor, and adjust tube feedings and TPN/PPN based on assessed needs  - Assess need for intravenous fluids  - Provide specific nutrition/hydration education as appropriate  - Include patient/family/caregiver in decisions related to nutrition  Outcome: Progressing     Problem: PAIN - ADULT  Goal: Verbalizes/displays adequate comfort level or baseline comfort level  Description: Interventions:  - Encourage patient to monitor pain and request assistance  - Assess pain using appropriate pain scale  - Administer analgesics based on type and severity of pain and evaluate response  - Implement non-pharmacological measures as appropriate and evaluate response  - Consider cultural and social influences on pain and pain management  - Notify physician/advanced practitioner if interventions unsuccessful or patient reports new pain  Outcome: Progressing     Problem: INFECTION - ADULT  Goal: Absence or prevention of progression during hospitalization  Description: INTERVENTIONS:  - Assess and monitor for signs and symptoms of infection  - Monitor lab/diagnostic results  - Monitor all insertion sites, i e  indwelling lines, tubes, and drains  - Monitor endotracheal if appropriate and nasal secretions for changes in amount and color  - [place] appropriate cooling/warming therapies per order  - Administer medications as ordered  - Instruct and encourage patient and family to use good hand hygiene technique  - Identify and instruct in appropriate isolation precautions for identified infection/condition  Outcome: Progressing  Goal: Absence of fever/infection during neutropenic period  Description: INTERVENTIONS:  - Monitor WBC    Outcome: Progressing     Problem: SAFETY ADULT  Goal: Patient will remain free of falls  Description: INTERVENTIONS:  - Educate patient/family on patient safety including physical limitations  - Instruct patient to call for assistance with activity   - Consult OT/PT to assist with strengthening/mobility   - Keep Call bell within reach  - Keep bed low and locked with side rails adjusted as appropriate  - Keep care items and personal belongings within reach  - Initiate and maintain comfort rounds  - Make Fall Risk Sign visible to staff  - Offer Toileting every 2 Hours, in advance of need  - Initiate/Maintain alarm  - Obtain necessary fall risk management equipment:   - Apply yellow socks and bracelet for high fall risk patients  - Consider moving patient to room near nurses station  Outcome: Progressing  Goal: Maintain or return to baseline ADL function  Description: INTERVENTIONS:  -  Assess patient's ability to carry out ADLs; assess patient's baseline for ADL function and identify physical deficits which impact ability to perform ADLs (bathing, care of mouth/teeth, toileting, grooming, dressing, etc )  - Assess/evaluate cause of self-care deficits   - Assess range of motion  - Assess patient's mobility; develop plan if impaired  - Assess patient's need for assistive devices and provide as appropriate  - Encourage maximum independence but intervene and supervise when necessary  - Involve family in performance of ADLs  - Assess for home care needs following discharge   - Consider OT consult to assist with ADL evaluation and planning for discharge  - Provide patient education as appropriate  Outcome: Progressing  Goal: Maintains/Returns to pre admission functional level  Description: INTERVENTIONS:  - Perform BMAT or MOVE assessment daily    - Set and communicate daily mobility goal to care team and patient/family/caregiver  - Collaborate with rehabilitation services on mobility goals if consulted  - Perform Range of Motion  times a day  - Reposition patient every 2 hours  - Dangle patient 2 times a day  - Stand patient 2 times a day  - Ambulate patient 2 times a day  - Out of bed to chair 2 times a day   - Out of bed for meals 2 times a day  - Out of bed for toileting  - Record patient progress and toleration of activity level   Outcome: Progressing     Problem: Knowledge Deficit  Goal: Patient/family/caregiver demonstrates understanding of disease process, treatment plan, medications, and discharge instructions  Description: Complete learning assessment and assess knowledge base    Interventions:  - Provide teaching at level of understanding  - Provide teaching via preferred learning methods  Outcome: Progressing     Problem: DISCHARGE PLANNING  Goal: Discharge to home or other facility with appropriate resources  Description: INTERVENTIONS:  - Identify barriers to discharge w/patient and caregiver  - Arrange for needed discharge resources and transportation as appropriate  - Identify discharge learning needs (meds, wound care, etc )  - Arrange for interpretive services to assist at discharge as needed  - Refer to Case Management Department for coordinating discharge planning if the patient needs post-hospital services based on physician/advanced practitioner order or complex needs related to functional status, cognitive ability, or social support system  Outcome: Progressing     Problem: GASTROINTESTINAL - ADULT  Goal: Minimal or absence of nausea and/or vomiting  Description: INTERVENTIONS:  - Administer IV fluids if ordered to ensure adequate hydration  - Maintain NPO status until nausea and vomiting are resolved  - Nasogastric tube if ordered  - Administer ordered antiemetic medications as needed  - Provide nonpharmacologic comfort measures as appropriate  - Advance diet as tolerated, if ordered  - Consider nutrition services referral to assist patient with adequate nutrition and appropriate food choices  Outcome: Progressing  Goal: Maintains or returns to baseline bowel function  Description: INTERVENTIONS:  - Assess bowel function  - Encourage oral fluids to ensure adequate hydration  - Administer IV fluids if ordered to ensure adequate hydration  - Administer ordered medications as needed  - Encourage mobilization and activity  - Consider nutritional services referral to assist patient with adequate nutrition and appropriate food choices  Outcome: Progressing  Goal: Maintains adequate nutritional intake  Description: INTERVENTIONS:  - Monitor percentage of each meal consumed  - Identify factors contributing to decreased intake, treat as appropriate  - Assist with meals as needed  - Monitor I&O, weight, and lab values if indicated  - Obtain nutrition services referral as needed  Outcome: Progressing  Goal: Establish and maintain optimal ostomy function  Description: INTERVENTIONS:  - Assess bowel function  - Encourage oral fluids to ensure adequate hydration  - Administer IV fluids if ordered to ensure adequate hydration   - Administer ordered medications as needed  - Encourage mobilization and activity  - Nutrition services referral to assist patient with appropriate food choices  - Assess stoma site  - Consider wound care consult Outcome: Progressing  Goal: Oral mucous membranes remain intact  Description: INTERVENTIONS  - Assess oral mucosa and hygiene practices  - Implement preventative oral hygiene regimen  - Implement oral medicated treatments as ordered  - Initiate Nutrition services referral as needed  Outcome: Progressing     Problem: MOBILITY - ADULT  Goal: Maintain or return to baseline ADL function  Description: INTERVENTIONS:  -  Assess patient's ability to carry out ADLs; assess patient's baseline for ADL function and identify physical deficits which impact ability to perform ADLs (bathing, care of mouth/teeth, toileting, grooming, dressing, etc )  - Assess/evaluate cause of self-care deficits   - Assess range of motion  - Assess patient's mobility; develop plan if impaired  - Assess patient's need for assistive devices and provide as appropriate  - Encourage maximum independence but intervene and supervise when necessary  - Involve family in performance of ADLs  - Assess for home care needs following discharge   - Consider OT consult to assist with ADL evaluation and planning for discharge  - Provide patient education as appropriate  Outcome: Progressing  Goal: Maintains/Returns to pre admission functional level  Description: INTERVENTIONS:  - Perform BMAT or MOVE assessment daily    - Set and communicate daily mobility goal to care team and patient/family/caregiver  - Collaborate with rehabilitation services on mobility goals if consulted  - Perform Range of Motion 2 times a day  - Reposition patient every 2 hours    - Dangle patient 2 times a day  - Stand patient 2 times a day  - Ambulate patient 2 times a day  - Out of bed to chair 2 times a day   - Out of bed for meals 2 times a day  - Out of bed for toileting  - Record patient progress and toleration of activity level   Outcome: Progressing

## 2022-07-08 NOTE — PROGRESS NOTES
Progress Note - general Surgery  Valente Fitzgerald 65 y o  male MRN: [id number]  Unit/Bed#: W -01 Encounter: [id number]    Assessment:  65 y o  male with diverticulitis and abscess  Plan:  - Diet Surgical; Cl Liq Toast Crax  - Pain and Nausea control PRN  - plan for repeat CT of abscess this morning  - continue Zosyn  Will need total 2 week course p o  Antibiotics at time of DC  - will likely be able to advance to fulls and discharge home today if CT scan improved  - will need outpatient colonoscopy    Subjective/Objective     Subjective:  Patient feels well this morning  States that he tolerated clears toast and crackers without any complications  Denies any abdominal pain this morning  Objective:   Vitals: Blood pressure 149/94, pulse 66, temperature 98 8 °F (37 1 °C), resp  rate 18, height 6' (1 829 m), weight 99 9 kg (220 lb 3 8 oz), SpO2 98 %  ,Body mass index is 29 87 kg/m²  I/O       07/06 0701  07/07 0700 07/07 0701  07/08 0700 07/08 0701  07/09 0700    P  O  120 840     I V  (mL/kg)  3006 3 (30 1)     IV Piggyback 100 100     Total Intake(mL/kg) 220 (2 2) 3946 3 (39 5)     Urine (mL/kg/hr)  1650 (0 7)     Total Output  1650     Net +220 +2296 3                  Physical Exam:  Gen: NAD, Aox3, Comfortable in bed  Chest: Normal work of breathing, no respiratory distress  Abd: Soft, ND, NT  Ext: No Edema  Skin: Warm, Dry, Intact      Lab, Imaging and other studies: I have personally reviewed pertinent reports      VTE Pharmacologic Prophylaxis: Enoxaparin (Lovenox)  VTE Mechanical Prophylaxis: sequential compression device        Paul Lubin MD  7/8/2022  7:53 AM

## 2022-07-08 NOTE — DISCHARGE INSTRUCTIONS
Continue full liquids (including anything that can be put in a ) until you have finished your full course of antibiotics  You may also have toast and crackers  After this, continue a low fiber diet for 3 weeks  (No salads, raw veggies or tough  meats i e  steak, pork chops)  In addition add Benefiber to your diet daily with at least 3 full glass of water or juice  Then you should transition to a high fiber diet to help prevent diverticulitis episodes in the future  Dedicated Bran cereal or Metamucil plus 3 glasses of water daily   (and you can stop Benefiber)  Diverticulitis   WHAT YOU NEED TO KNOW:   Diverticulitis is a condition that causes small pockets along your intestine called diverticula to become inflamed or infected  This is caused by hard bowel movements, food, or bacteria that get stuck in the pockets  DISCHARGE INSTRUCTIONS:   Return to the emergency department if:   You have bowel movement or foul-smelling discharge leaking from your vagina or in your urine  You have severe diarrhea  You urinate less than usual or not at all  You are not able to have a bowel movement  You cannot stop vomiting  You have severe abdominal pain, a fever, and your abdomen is larger than usual     You have new or increased blood in your bowel movements  Call your doctor if:   You have pain when you urinate  Your symptoms get worse or do not go away  You have questions or concerns about your condition or care  Medicines:   Antibiotics  may be given to help treat a bacterial infection  Prescription pain medicine  may be given  Ask your healthcare provider how to take this medicine safely  Some prescription pain medicines contain acetaminophen  Do not take other medicines that contain acetaminophen without talking to your healthcare provider  Too much acetaminophen may cause liver damage  Prescription pain medicine may cause constipation   Ask your healthcare provider how to prevent or treat constipation  Take your medicine as directed  Contact your healthcare provider if you think your medicine is not helping or if you have side effects  Tell him or her if you are allergic to any medicine  Keep a list of the medicines, vitamins, and herbs you take  Include the amounts, and when and why you take them  Bring the list or the pill bottles to follow-up visits  Carry your medicine list with you in case of an emergency  Clear liquid diet:  A clear liquid diet includes any liquids that you can see through  Examples include water, ginger-jose, cranberry or apple juice, frozen fruit ice, or broth  Stay on a clear liquid diet until your symptoms are gone, or as directed  Follow up with your doctor as directed: You may need to return for a colonoscopy  When your symptoms are gone, you may need a low-fat, high-fiber diet to prevent diverticulitis from developing again  Your healthcare provider or dietitian can help you create meal plans  Write down your questions so you remember to ask them during your visits  © Copyright [employer] 2022 Information is for End User's use only and may not be sold, redistributed or otherwise used for commercial purposes  All illustrations and images included in CareNotes® are the copyrighted property of A D A M , Inc  or [facility] [employer]  The above information is an  only  It is not intended as medical advice for individual conditions or treatments  Talk to your doctor, nurse or pharmacist before following any medical regimen to see if it is safe and effective for you  Full Liquid Diet   A full liquid diet  includes only liquids and foods that are liquid at room temperature  You may need to follow this diet if you have trouble chewing or swallowing  You may also need to follow this diet if you have a severe intestinal illness or had surgery on your intestine   Your healthcare provider will tell you how long to follow this diet and when to start solid foods  Liquids and foods to include:   Fruit juices or pureed fruit without seeds    Vegetable juices or pureed vegetables in broth     Broth or strained cream soups    Refined and strained cooked cereals thinned with milk or half-and-half creamer    Flavored gelatin without chunks of fruit    Plain ice cream, milk shakes, sherbet, or popsicles    Yogurt, pudding, or soft custard    Milk or liquid nutrition supplements    Coffee, tea, sodas, water, or sports drinks    Butter, margarine, or cream      Low Fiber Diet   WHAT YOU NEED TO KNOW:   A low-fiber diet limits foods that are high in fiber  Fiber is the part of fruits, vegetables, and grains that is not broken down by your body  You may need to follow this diet after surgery on your intestines  You may also need to follow this diet for certain conditions such as Crohn disease or ulcerative colitis  Ask your healthcare provider or dietitian how much fiber you can have each day  DISCHARGE INSTRUCTIONS:   Foods to include:  Read food labels to check the amount of fiber that are found in foods  Some foods that are low in fiber include the following:  Grains:  Choose grains that have less than 2 grams of fiber in each serving   Examples include the following:    Cream of wheat and finely ground grits    Dry cereal made from rice    White bread, white pasta, and white rice    Crackers, bagels, and rolls made from white or refined flour    Other foods:      Canned and well-cooked fruit without skins or seeds, and juice without pulp    Ripe bananas and melons    Canned and well-cooked vegetables without skins or seeds, and vegetable juice    Cow's milk, lactose-free milk, soy milk, and rice milk    Yogurt without nuts, fruit, or granola    Eggs, poultry (such as chicken and turkey), fish, and tender, ground, well-cooked beef    Tofu and smooth peanut butter    Broth and strained soups made of low-fiber foods    Foods to avoid:   Breads, cereals, crackers, and pasta made with whole wheat or whole grains (such as whole oats)    [employer], wild rice, quinoa, kasha, and barley    All fresh fruit with skin, except banana and melons    Dried fruits and fruit juice with pulp    Canned pineapple    Raw vegetables    Nuts, seeds, and popcorn    Beans, nuts, peas, and lentils    Tough meats    Coconut and avocado    High Fiber Diet   WHAT YOU NEED TO KNOW:   A high-fiber diet includes foods that have a high amount of fiber  Fiber is the part of fruits, vegetables, and grains that is not broken down by your body  Fiber keeps your bowel movements regular  Fiber can also help lower your cholesterol level, control blood sugar in people with diabetes, and relieve constipation  Fiber can also help you control your weight because it helps you feel full faster  Most adults should eat 25 to 35 grams of fiber each day  Talk to your dietitian or healthcare provider about the amount of fiber you need  DISCHARGE INSTRUCTIONS:   Good sources of fiber:    Foods with at least 4 grams of fiber per serving:      ? to ½ cup of high-fiber cereal (check the nutrition label on the box)    ½ cup of blackberries or raspberries    4 dried prunes    1 cooked artichoke    ½ cup of cooked legumes, such as lentils, or red, kidney, and fernandes beans    Foods with 1 to 3 grams of fiber per servin slice of whole-wheat, pumpernickel, or rye bread    ½ cup of cooked brown rice    4 whole-wheat crackers    1 cup of oatmeal    ½ cup of cereal with 1 to 3 grams of fiber per serving (check the nutrition label on the box)    1 small piece of fruit, such as an apple, banana, pear, kiwi, or orange    3 dates    ½ cup of canned apricots, fruit cocktail, peaches, or pears    ½ cup of raw or cooked vegetables, such as carrots, cauliflower, cabbage, spinach, squash, or corn    Ways that you can increase fiber in your diet:   Choose brown or wild rice instead of white rice       Use whole wheat flour in recipes instead of white or all-purpose flour  Add beans and peas to casseroles or soups  Choose fresh fruit and vegetables with peels or skins on instead of juices  Other diet guidelines to follow: Add fiber to your diet slowly  You may have abdominal discomfort, bloating, and gas if you add fiber to your diet too quickly  Drink plenty of liquids as you add fiber to your diet  You may have nausea or develop constipation if you do not drink enough water  Ask how much liquid to drink each day and which liquids are best for you

## 2022-07-09 VITALS
DIASTOLIC BLOOD PRESSURE: 101 MMHG | OXYGEN SATURATION: 96 % | HEART RATE: 67 BPM | TEMPERATURE: 97.6 F | SYSTOLIC BLOOD PRESSURE: 153 MMHG | BODY MASS INDEX: 29.83 KG/M2 | RESPIRATION RATE: 18 BRPM | WEIGHT: 220.24 LBS | HEIGHT: 72 IN

## 2022-07-09 PROCEDURE — 99238 HOSP IP/OBS DSCHRG MGMT 30/<: CPT | Performed by: SURGERY

## 2022-07-09 PROCEDURE — NC001 PR NO CHARGE: Performed by: SURGERY

## 2022-07-09 RX ORDER — ACETAMINOPHEN 325 MG/1
650 TABLET ORAL EVERY 6 HOURS PRN
Refills: 0 | Status: CANCELLED | COMMUNITY
Start: 2022-07-09

## 2022-07-09 RX ORDER — METRONIDAZOLE 500 MG/1
500 TABLET ORAL EVERY 8 HOURS SCHEDULED
Qty: 21 TABLET | Refills: 0 | Status: SHIPPED | OUTPATIENT
Start: 2022-07-09 | End: 2022-07-16

## 2022-07-09 RX ORDER — AMOXICILLIN AND CLAVULANATE POTASSIUM 875; 125 MG/1; MG/1
1 TABLET, FILM COATED ORAL EVERY 12 HOURS SCHEDULED
Qty: 14 TABLET | Refills: 0 | Status: SHIPPED | OUTPATIENT
Start: 2022-07-09 | End: 2022-07-16

## 2022-07-09 RX ADMIN — PIPERACILLIN AND TAZOBACTAM 4.5 G: 4; .5 INJECTION, POWDER, LYOPHILIZED, FOR SOLUTION INTRAVENOUS at 06:05

## 2022-07-09 RX ADMIN — ENOXAPARIN SODIUM 40 MG: 40 INJECTION SUBCUTANEOUS at 08:02

## 2022-07-09 NOTE — PLAN OF CARE
Problem: Nutrition/Hydration-ADULT  Goal: Nutrient/Hydration intake appropriate for improving, restoring or maintaining nutritional needs  Description: Monitor and assess patient's nutrition/hydration status for malnutrition  Collaborate with interdisciplinary team and initiate plan and interventions as ordered  Monitor patient's weight and dietary intake as ordered or per policy  Utilize nutrition screening tool and intervene as necessary  Determine patient's food preferences and provide high-protein, high-caloric foods as appropriate       INTERVENTIONS:  - Monitor oral intake, urinary output, labs, and treatment plans  - Assess nutrition and hydration status and recommend course of action  - Evaluate amount of meals eaten  - Assist patient with eating if necessary   - Allow adequate time for meals  - Recommend/ encourage appropriate diets, oral nutritional supplements, and vitamin/mineral supplements  - Order, calculate, and assess calorie counts as needed  - Recommend, monitor, and adjust tube feedings and TPN/PPN based on assessed needs  - Assess need for intravenous fluids  - Provide specific nutrition/hydration education as appropriate  - Include patient/family/caregiver in decisions related to nutrition  Outcome: Progressing     Problem: PAIN - ADULT  Goal: Verbalizes/displays adequate comfort level or baseline comfort level  Description: Interventions:  - Encourage patient to monitor pain and request assistance  - Assess pain using appropriate pain scale  - Administer analgesics based on type and severity of pain and evaluate response  - Implement non-pharmacological measures as appropriate and evaluate response  - Consider cultural and social influences on pain and pain management  - Notify physician/advanced practitioner if interventions unsuccessful or patient reports new pain  Outcome: Progressing     Problem: INFECTION - ADULT  Goal: Absence or prevention of progression during hospitalization  Description: INTERVENTIONS:  - Assess and monitor for signs and symptoms of infection  - Monitor lab/diagnostic results  - Monitor all insertion sites, i e  indwelling lines, tubes, and drains  - Monitor endotracheal if appropriate and nasal secretions for changes in amount and color  - [place] appropriate cooling/warming therapies per order  - Administer medications as ordered  - Instruct and encourage patient and family to use good hand hygiene technique  - Identify and instruct in appropriate isolation precautions for identified infection/condition  Outcome: Progressing  Goal: Absence of fever/infection during neutropenic period  Description: INTERVENTIONS:  - Monitor WBC    Outcome: Progressing     Problem: SAFETY ADULT  Goal: Patient will remain free of falls  Description: INTERVENTIONS:  - Educate patient/family on patient safety including physical limitations  - Instruct patient to call for assistance with activity   - Consult OT/PT to assist with strengthening/mobility   - Keep Call bell within reach  - Keep bed low and locked with side rails adjusted as appropriate  - Keep care items and personal belongings within reach  - Initiate and maintain comfort rounds  - Make Fall Risk Sign visible to staff  - Offer Toileting every  Hours, in advance of need  - Initiate/Maintain alarm  - Obtain necessary fall risk management equipment:   - Apply yellow socks and bracelet for high fall risk patients  - Consider moving patient to room near nurses station  Outcome: Progressing  Goal: Maintain or return to baseline ADL function  Description: INTERVENTIONS:  -  Assess patient's ability to carry out ADLs; assess patient's baseline for ADL function and identify physical deficits which impact ability to perform ADLs (bathing, care of mouth/teeth, toileting, grooming, dressing, etc )  - Assess/evaluate cause of self-care deficits   - Assess range of motion  - Assess patient's mobility; develop plan if impaired  - Assess patient's need for assistive devices and provide as appropriate  - Encourage maximum independence but intervene and supervise when necessary  - Involve family in performance of ADLs  - Assess for home care needs following discharge   - Consider OT consult to assist with ADL evaluation and planning for discharge  - Provide patient education as appropriate  Outcome: Progressing  Goal: Maintains/Returns to pre admission functional level  Description: INTERVENTIONS:  - Perform BMAT or MOVE assessment daily    - Set and communicate daily mobility goal to care team and patient/family/caregiver  - Collaborate with rehabilitation services on mobility goals if consulted  - Perform Range of Motion  times a day  - Reposition patient every  hours    - Dangle patient  times a day  - Stand patient  times a day  - Ambulate patient  times a day  - Out of bed to chair  times a day   - Out of bed for meals  times a day  - Out of bed for toileting  - Record patient progress and toleration of activity level   Outcome: Progressing     Problem: DISCHARGE PLANNING  Goal: Discharge to home or other facility with appropriate resources  Description: INTERVENTIONS:  - Identify barriers to discharge w/patient and caregiver  - Arrange for needed discharge resources and transportation as appropriate  - Identify discharge learning needs (meds, wound care, etc )  - Arrange for interpretive services to assist at discharge as needed  - Refer to Case Management Department for coordinating discharge planning if the patient needs post-hospital services based on physician/advanced practitioner order or complex needs related to functional status, cognitive ability, or social support system  Outcome: Progressing     Problem: Knowledge Deficit  Goal: Patient/family/caregiver demonstrates understanding of disease process, treatment plan, medications, and discharge instructions  Description: Complete learning assessment and assess knowledge base    Interventions:  - Provide teaching at level of understanding  - Provide teaching via preferred learning methods  Outcome: Progressing     Problem: GASTROINTESTINAL - ADULT  Goal: Minimal or absence of nausea and/or vomiting  Description: INTERVENTIONS:  - Administer IV fluids if ordered to ensure adequate hydration  - Maintain NPO status until nausea and vomiting are resolved  - Nasogastric tube if ordered  - Administer ordered antiemetic medications as needed  - Provide nonpharmacologic comfort measures as appropriate  - Advance diet as tolerated, if ordered  - Consider nutrition services referral to assist patient with adequate nutrition and appropriate food choices  Outcome: Progressing  Goal: Maintains or returns to baseline bowel function  Description: INTERVENTIONS:  - Assess bowel function  - Encourage oral fluids to ensure adequate hydration  - Administer IV fluids if ordered to ensure adequate hydration  - Administer ordered medications as needed  - Encourage mobilization and activity  - Consider nutritional services referral to assist patient with adequate nutrition and appropriate food choices  Outcome: Progressing  Goal: Maintains adequate nutritional intake  Description: INTERVENTIONS:  - Monitor percentage of each meal consumed  - Identify factors contributing to decreased intake, treat as appropriate  - Assist with meals as needed  - Monitor I&O, weight, and lab values if indicated  - Obtain nutrition services referral as needed  Outcome: Progressing  Goal: Establish and maintain optimal ostomy function  Description: INTERVENTIONS:  - Assess bowel function  - Encourage oral fluids to ensure adequate hydration  - Administer IV fluids if ordered to ensure adequate hydration   - Administer ordered medications as needed  - Encourage mobilization and activity  - Nutrition services referral to assist patient with appropriate food choices  - Assess stoma site  - Consider wound care consult Outcome: Progressing  Goal: Oral mucous membranes remain intact  Description: INTERVENTIONS  - Assess oral mucosa and hygiene practices  - Implement preventative oral hygiene regimen  - Implement oral medicated treatments as ordered  - Initiate Nutrition services referral as needed  Outcome: Progressing     Problem: Potential for Falls  Goal: Patient will remain free of falls  Description: INTERVENTIONS:  - Educate patient/family on patient safety including physical limitations  - Instruct patient to call for assistance with activity   - Consult OT/PT to assist with strengthening/mobility   - Keep Call bell within reach  - Keep bed low and locked with side rails adjusted as appropriate  - Keep care items and personal belongings within reach  - Initiate and maintain comfort rounds  - Make Fall Risk Sign visible to staff  - Offer Toileting every  Hours, in advance of need  - Initiate/Maintain alarm  - Obtain necessary fall risk management equipment:   - Apply yellow socks and bracelet for high fall risk patients  - Consider moving patient to room near nurses station  Outcome: Progressing     Problem: MOBILITY - ADULT  Goal: Maintain or return to baseline ADL function  Description: INTERVENTIONS:  -  Assess patient's ability to carry out ADLs; assess patient's baseline for ADL function and identify physical deficits which impact ability to perform ADLs (bathing, care of mouth/teeth, toileting, grooming, dressing, etc )  - Assess/evaluate cause of self-care deficits   - Assess range of motion  - Assess patient's mobility; develop plan if impaired  - Assess patient's need for assistive devices and provide as appropriate  - Encourage maximum independence but intervene and supervise when necessary  - Involve family in performance of ADLs  - Assess for home care needs following discharge   - Consider OT consult to assist with ADL evaluation and planning for discharge  - Provide patient education as appropriate  Outcome: Progressing  Goal: Maintains/Returns to pre admission functional level  Description: INTERVENTIONS:  - Perform BMAT or MOVE assessment daily    - Set and communicate daily mobility goal to care team and patient/family/caregiver  - Collaborate with rehabilitation services on mobility goals if consulted  - Perform Range of Motion  times a day  - Reposition patient every  hours    - Dangle patient  times a day  - Stand patient  times a day  - Ambulate patient  times a day  - Out of bed to chair  times a day   - Out of bed for meals  times a day  - Out of bed for toileting  - Record patient progress and toleration of activity level   Outcome: Progressing

## 2022-07-09 NOTE — DISCHARGE SUMMARY
Discharge Summary - Fritz Fitzgerald 65 y o  male MRN: [id number]    Unit/Bed#: W -50 Encounter: [id number]    Admission Date:   Admission Orders (From admission, onward)     Ordered        07/05/22 2125  Inpatient Admission  Once                        Admitting Diagnosis: Abdominal pain [R10 9]  Fever [R50 9]  Acute diverticulitis [K57 92]    HPI: Marissa Conklin is a 65 y o  male who is otherwise healthy, presents with abdominal pain, fevers, chills and suppressed appetite for the last 3 days  Patient states his symptoms started suddenly on Sunday afternoon  His abdominal pains is located throughout the lower abdomen, has been dull and is nonradiating  He denies nausea or vomiting, but states he has had little appetite over the last few days  Fevers and chills at home (Tmax 103F)  One episode of diarrhea, but otherwise has had normal nonbloody BMs  He is passing flatus, but does also complain of abdominal bloating  He endorses similar symptoms 8 years ago after getting food poisoning after eating oysters       He denies pneumaturia, fecaluria, foul smelling urine  States he has had somewhat more difficulty with initiation of urination, but this is 2/2 lower abdominal pain       Past surgical history is significant for an umbilical hernia repair with mesh in April of this year with Dr Clay President  Last colonoscopy was in 2019, which demonstrated polyps and few sigmoid diverticula  Procedures Performed:   Orders Placed This Encounter   Procedures    ED ECG Documentation Only       Summary of Hospital Course: ED workup revealed acute simgoid diverticulitis with an associated abscess  He was examined in the ED where he was found to be nontoxic in appearance with tenderness in his lower abdomen, without peritoneal signs  He was admitted to the general surgery service and conservative management was pursued    He was started on IV Zosyn, IV fluids and bowel rest   Interventional Radiology was consulted for consideration of drainage of the intra-abdominal abscess, however there was no window for drainage  Patient has leukocytosis resolved and he defervesced  His diet was slowly advanced as his symptoms and clinical picture improved  On hospital day 4, patient was tolerating a full liquid diet, having normal bowel function, remained afebrile and was deemed stable for discharge home on six additional days of oral antibiotics with follow-up with the General surgery service in clinic as an outpatient  All questions were answered prior to discharge  Significant Findings, Care, Treatment and Services Provided:   7/5 CTAP w/o: Sigmoid diverticular disease w inflammatory change c/f diverticulitis  Adjacent collection w air-fluid level measuring up to 2 7 x 3 7 cm c/f abscess, questionably communicates w the bladder c/f fistulization  7/8 CTAP w/ PO: Mild improvement in sigmoid wall thickening, collection stable in size  No enteric contrast or air within the bladder lumen to confirm communication  Complications:  None    Discharge Diagnosis:  Acute sigmoid diverticulitis    Medical Problems             Resolved Problems  Date Reviewed: 5/4/2022   None                 Condition at Discharge: good         Discharge instructions/Information to patient and family:   See after visit summary for information provided to patient and family  Provisions for Follow-Up Care:  See after visit summary for information related to follow-up care and any pertinent home health orders  PCP: Dean Csatillo DO    Disposition: Home    Planned Readmission: No      Discharge Statement   I spent 20 minutes discharging the patient  This time was spent on the day of discharge  I had direct contact with the patient on the day of discharge  Additional documentation is required if more than 30 minutes were spent on discharge       Discharge Medications:  See after visit summary for reconciled discharge medications provided to patient and family

## 2022-07-10 LAB
BACTERIA BLD CULT: NORMAL
BACTERIA BLD CULT: NORMAL

## 2022-07-11 NOTE — UTILIZATION REVIEW
Notification of Discharge   This is a Notification of Discharge from our facility [address]  Please be advised that this patient has been discharge from our facility  Below you will find the admission and discharge date and time including the patients disposition  UTILIZATION REVIEW CONTACT:  Josue Landa MA  Utilization   Network Utilization Review Department  Phone: [phone] x carefully listen to the prompts  All voicemails are confidential   Email: [email or web address]     PHYSICIAN ADVISORY SERVICES:  FOR [id number] REVIEW - MEDICAL NECESSITY DENIAL  Phone: [phone]  Fax: [phone]  Email: [email or web address]     PRESENTATION DATE: 7/5/2022  4:07 PM  OBERVATION ADMISSION DATE:   INPATIENT ADMISSION DATE: 7/5/22  9:25 PM   DISCHARGE DATE: 7/9/2022  9:26 AM  DISPOSITION: Home/Self Care Home/Self Care      IMPORTANT INFORMATION:  Send all requests for admission clinical reviews, approved or denied determinations and any other requests to dedicated fax number below belonging to the campus where the patient is receiving treatment   List of dedicated fax numbers:  [address] DENIALS (Administrative/Medical Necessity) [phone]   [address] (Maternity/NICU/Pediatrics) [phone]   [facility] [phone]   [address] [place] [address] [phone]   [address] [place] [address] [phone]   [address] [phone]   [facility]  [phone]   [address] [facility] [address]  [address] [facility] [address] [facility] [phone]

## 2022-07-26 ENCOUNTER — OFFICE VISIT (OUTPATIENT)
Dept: GASTROENTEROLOGY | Facility: CLINIC | Age: 57
End: 2022-07-26
Payer: COMMERCIAL

## 2022-07-26 VITALS
WEIGHT: 215 LBS | HEIGHT: 72 IN | BODY MASS INDEX: 29.12 KG/M2 | SYSTOLIC BLOOD PRESSURE: 130 MMHG | TEMPERATURE: 97.6 F | OXYGEN SATURATION: 97 % | HEART RATE: 77 BPM | DIASTOLIC BLOOD PRESSURE: 81 MMHG

## 2022-07-26 DIAGNOSIS — K57.92 DIVERTICULITIS: Primary | ICD-10-CM

## 2022-07-26 PROCEDURE — 99203 OFFICE O/P NEW LOW 30 MIN: CPT | Performed by: PHYSICIAN ASSISTANT

## 2022-07-26 RX ORDER — SODIUM PICOSULFATE, MAGNESIUM OXIDE, AND ANHYDROUS CITRIC ACID 10; 3.5; 12 MG/160ML; G/160ML; G/160ML
1 LIQUID ORAL ONCE
Qty: 320 ML | Refills: 0 | Status: SHIPPED | OUTPATIENT
Start: 2022-07-26 | End: 2022-07-26

## 2022-07-26 NOTE — PATIENT INSTRUCTIONS
Scheduled date of colonoscopy (as of today): 11/10/22  Physician performing colonoscopy: Lata Denny  [facility]  Bowel prep reviewed with patient: CLENPIQ  Instructions reviewed with patient by: VIRGINIA  Clearances: NONE

## 2022-07-26 NOTE — PROGRESS NOTES
[facility] - Outpatient Consultation  Zee Ellis 65 y o  male MRN: [id number]  Encounter: [id number]          ASSESSMENT AND PLAN:      1  Acute sigmoid diverticulitis complicated by abscess  Recent addition 7/5 to 7/8 complicated by abscess  No adequate window available for percutaneous drainage  Repeat CT scan toward end of admission showed improvement of abscess however still measuring 3 6 x 2 4 cm with questionable fistula to the bladder  WBC resolved  Patient finished antibiotics a week ago and doing well with no further abdominal pain, fevers  He does report some urinary symptoms with feel as though he cannot empty his bladder  He denies dysuria, air was stool in the urine  Colonoscopy in 2019 with repeat recommended in 5 years     -schedule colonoscopy to be performed in 8 weeks after resolution of diverticulitis    -discussed the risks of surgery including bleeding, infection perforation     -advised patient to reach out to us prior to his procedure if he has any recurrence of the symptoms as it would be unsafe to undergo colonoscopy in the setting of active infection/inflammation    -clenpiq prep  Can use miralax with mag citrate if not covered     -continue low-fiber diet for the next 2 weeks followed by high-fiber diet and fiber supplement     -I sent a message to surgery due to persistent urinary symptoms for further recommendations about possible repeat imaging or follow-up     -I sent a message to Dr Anselmo Blood to ensure he would not like any repeat imaging prior to complete his colonoscopy  Follow up after procedure   ______________________________________________________________________    HPI:      Zee Ellis is a 73-year-old male with no significant past medical history who presents as a new patient after hospitalization for diverticulitis complicated by abscess  This was his first episode      Patient reports that the beginning of this month he had acute onset abdominal cramping associated with fever  He had imaging on arrival which was notable for acute sigmoid diverticulitis with adjacent collection concerning for an abscess measuring 2 7 times 3 7 cm  He was admitted under surgical services and was receiving IV antibiotics  Initially a drain placement is desired however no clear pocket was available for drainage  Repeat CT scan 7/8 showed size of 3 6 x 2 4 cm with possible, questionable fistula extending from collection to the bladder although no contrast or air was present  White count improved from 13-6 with normal hemoglobin and platelets  BMP normal   CMP normal     Since discharge patient has completed antibiotics around 1 week ago  Reports over the past 2 days he has noticed that he feels more like himself and is doing very well  He reports having a fever on July 13th which has resolved and fever since  No nausea or vomiting  He currently has no abdominal pain  He is following a low-fiber diet and will be advancing to a high-fiber diet in 2 weeks  He does report some urinary symptoms of frequency with decreased stream   He feels as though he cannot empty properly  He denies any ear or stool noted in the urine  He has no dysuria  Abdominal surgeries consistent for hernia repair  No family history of colon cancer  Patient's last colonoscopy was in 2019 with removal of polyps and repeat recommended in 5 years  No prior EGD      REVIEW OF SYSTEMS:    CONSTITUTIONAL: Denies any fever, chills, rigors, and weight loss  HEENT: No earache or tinnitus  Denies hearing loss or visual disturbances  CARDIOVASCULAR: No chest pain or palpitations  RESPIRATORY: Denies any cough, hemoptysis, shortness of breath or dyspnea on exertion  GASTROINTESTINAL: As noted in the History of Present Illness  GENITOURINARY: +incompete voiding, denies dysuria, hematuria, fecaluria  NEUROLOGIC: No dizziness or vertigo, denies headaches  MUSCULOSKELETAL: Denies any muscle or joint pain  SKIN: Denies skin rashes or itching  ENDOCRINE: Denies excessive thirst  Denies intolerance to heat or cold  PSYCHOSOCIAL: Denies depression or anxiety  Denies any recent memory loss  Historical Information   Past Medical History:   Diagnosis Date    Arthritis     Diverticulitis of colon     Fracture     right leg    Hyperlipidemia     diet controlled    Rotator cuff tear     bilateral    Snoring      Past Surgical History:   Procedure Laterality Date    COLONOSCOPY      HIP ARTHROPLASTY Right     JOINT REPLACEMENT      total r hip    KNEE ARTHROSCOPY Bilateral     l x1 r x2    ID COLONOSCOPY FLX DX W/COLLJ SPEC WHEN PFRMD N/A 2016    Procedure: COLONOSCOPY;  Surgeon: Jack Joseph MD;  [facility];   Service: Gastroenterology    ID REPAIR UMBILICAL NDRS,4+D/N,SYTBF N/A 2022    Procedure: UMBILICAL HERNIA REPAIR;  Surgeon: Luis Anguiano DO;  [facility];  Service: General    ID SHLDR ARTHROSCOP,SURG,W/ROTAT CUFF REPR Right 2018    Procedure: SHOULDER ARTHROSCOPIC ROTATOR CUFF REPAIR;  Surgeon: Juan Bran MD;  [facility];  Service: Orthopedics    ID WRIST Rosibel Small LIG Right 2016    Procedure: ENDOSCOPIC CARPAL TUNNEL RELEASE ;  Surgeon: Jose May MD;  [facility];  Service: Orthopedics    ROTATOR CUFF REPAIR      WISDOM TOOTH EXTRACTION       Social History   Social History     Substance and Sexual Activity   Alcohol Use Yes    Comment: socially     Social History     Substance and Sexual Activity   Drug Use No     Social History     Tobacco Use   Smoking Status Former Smoker    Types: Cigarettes    Quit date: 200    Years since quittin 5   Smokeless Tobacco Never Used     Family History   Problem Relation Age of Onset    No Known Problems Family        Meds/Allergies       Current Outpatient Medications:     Sod Picosulfate-Mag Ox-Cit Acd (Clenpiq) 10-3 5-12 MG-GM -GM/160ML SOLN    naproxen (NAPROSYN) 500 mg tablet    No Known Allergies        Objective     Blood pressure 130/81, pulse 77, temperature 97 6 °F (36 4 °C), height 6' (1 829 m), weight 97 5 kg (215 lb), SpO2 97 %  Body mass index is 29 16 kg/m²  PHYSICAL EXAM:      General Appearance:   Alert, cooperative, no distress  Well appearing   HEENT:   Normocephalic, atraumatic, anicteric      Neck:  Supple, symmetrical, trachea midline   Lungs:   Clear to auscultation bilaterally; no rales, rhonchi or wheezing; respirations unlabored    Heart[de-identified]   Regular rate and rhythm; no murmur, rub, or gallop  Abdomen:   Soft, non-tender, non-distended; normal bowel sounds; no masses, no organomegaly  Benign abdomen    Genitalia:   Deferred    Rectal:   Deferred    Extremities:  No cyanosis, clubbing or edema    Pulses:  2+ and symmetric    Skin:  No jaundice, rashes, or lesions    Lymph nodes:  No palpable cervical lymphadenopathy        Lab Results:   No visits with results within 1 Day(s) from this visit     Latest known visit with results is:   Admission on 07/05/2022, Discharged on 07/09/2022   Component Date Value    WBC 07/05/2022 13 78 (A)    RBC 07/05/2022 4 35     Hemoglobin 07/05/2022 13 9     Hematocrit 07/05/2022 40 4     MCV 07/05/2022 93     MCH 07/05/2022 32 0     MCHC 07/05/2022 34 4     RDW 07/05/2022 12 7     MPV 07/05/2022 8 3 (A)    Platelets 20/41/6478 263     nRBC 07/05/2022 0     Neutrophils Relative 07/05/2022 87 (A)    Immat GRANS % 07/05/2022 0     Lymphocytes Relative 07/05/2022 7 (A)    Monocytes Relative 07/05/2022 5     Eosinophils Relative 07/05/2022 0     Basophils Relative 07/05/2022 1     Neutrophils Absolute 07/05/2022 12 02 (A)    Immature Grans Absolute 07/05/2022 0 06     Lymphocytes Absolute 07/05/2022 0 93     Monocytes Absolute 07/05/2022 0 68     Eosinophils Absolute 07/05/2022 0 02     Basophils Absolute 07/05/2022 0 07     Sodium 07/05/2022 136     Potassium 07/05/2022 4 0     Chloride 07/05/2022 101     CO2 07/05/2022 26     ANION GAP 07/05/2022 9     BUN 07/05/2022 18     Creatinine 07/05/2022 1 18     Glucose 07/05/2022 111     Calcium 07/05/2022 9 2     AST 07/05/2022 14     ALT 07/05/2022 17     Alkaline Phosphatase 07/05/2022 68     Total Protein 07/05/2022 7 5     Albumin 07/05/2022 3 9     Total Bilirubin 07/05/2022 0 78     eGFR 07/05/2022 68     Lipase 07/05/2022 8 (A)    Extra Tube 07/05/2022 Hold for add-ons   hs TnI 0hr 07/05/2022 6     Blood Culture 07/05/2022 No Growth After 5 Days   Blood Culture 07/05/2022 No Growth After 5 Days       LACTIC ACID 07/05/2022 0 7     Color, UA 07/05/2022 Yellow     Clarity, UA 07/05/2022 Clear     Specific Gravity, UA 07/05/2022 1 020     pH, UA 07/05/2022 6 0     Leukocytes, UA 07/05/2022 Negative     Nitrite, UA 07/05/2022 Negative     Protein, UA 07/05/2022 30 (1+) (A)    Glucose, UA 07/05/2022 Negative     Ketones, UA 07/05/2022 Trace (A)    Urobilinogen, UA 07/05/2022 2 0 (A)    Bilirubin, UA 07/05/2022 Negative     Occult Blood, UA 07/05/2022 Moderate (A)    RBC, UA 07/05/2022 2-4     WBC, UA 07/05/2022 1-2     Epithelial Cells 07/05/2022 None Seen     Bacteria, UA 07/05/2022 Occasional     MUCUS THREADS 07/05/2022 Innumerable (A)    hs TnI 4hr 07/05/2022 6     Delta 4hr hsTnI 07/05/2022 0     Sodium 07/06/2022 136     Potassium 07/06/2022 3 9     Chloride 07/06/2022 107     CO2 07/06/2022 25     ANION GAP 07/06/2022 4     BUN 07/06/2022 16     Creatinine 07/06/2022 1 02     Glucose 07/06/2022 100     Calcium 07/06/2022 8 2 (A)    eGFR 07/06/2022 81     WBC 07/06/2022 10 42 (A)    RBC 07/06/2022 3 73 (A)    Hemoglobin 07/06/2022 11 7 (A)    Hematocrit 07/06/2022 34 9 (A)    MCV 07/06/2022 94     MCH 07/06/2022 31 4     MCHC 07/06/2022 33 5     RDW 07/06/2022 12 6     MPV 07/06/2022 8 5 (A)    Platelets 14/48/2654 217     nRBC 07/06/2022 0     Neutrophils Relative 07/06/2022 84 (A)    Immat GRANS % 07/06/2022 1     Lymphocytes Relative 07/06/2022 7 (A)    Monocytes Relative 07/06/2022 6     Eosinophils Relative 07/06/2022 1     Basophils Relative 07/06/2022 1     Neutrophils Absolute 07/06/2022 8 81 (A)    Immature Grans Absolute 07/06/2022 0 09     Lymphocytes Absolute 07/06/2022 0 77     Monocytes Absolute 07/06/2022 0 57     Eosinophils Absolute 07/06/2022 0 11     Basophils Absolute 07/06/2022 0 07     Ventricular Rate 07/05/2022 80     Atrial Rate 07/05/2022 81     [place] Interval 07/05/2022 164     QRSD Interval 07/05/2022 80     QT Interval 07/05/2022 350     QTC Interval 07/05/2022 403     P Axis 07/05/2022 60     QRS Axis 07/05/2022 63     T Wave Axis 07/05/2022 39     Sodium 07/07/2022 138     Potassium 07/07/2022 4 0     Chloride 07/07/2022 106     CO2 07/07/2022 25     ANION GAP 07/07/2022 7     BUN 07/07/2022 10     Creatinine 07/07/2022 1 02     Glucose 07/07/2022 116     Calcium 07/07/2022 8 9     eGFR 07/07/2022 81     WBC 07/07/2022 6 43     RBC 07/07/2022 4 00     Hemoglobin 07/07/2022 12 4     Hematocrit 07/07/2022 37 3     MCV 07/07/2022 93     MCH 07/07/2022 31 0     MCHC 07/07/2022 33 2     RDW 07/07/2022 12 4     MPV 07/07/2022 8 4 (A)    Platelets 04/14/0368 270     nRBC 07/07/2022 0     Neutrophils Relative 07/07/2022 76 (A)    Immat GRANS % 07/07/2022 1     Lymphocytes Relative 07/07/2022 13 (A)    Monocytes Relative 07/07/2022 6     Eosinophils Relative 07/07/2022 3     Basophils Relative 07/07/2022 1     Neutrophils Absolute 07/07/2022 4 91     Immature Grans Absolute 07/07/2022 0 06     Lymphocytes Absolute 07/07/2022 0 84     Monocytes Absolute 07/07/2022 0 38     Eosinophils Absolute 07/07/2022 0 18     Basophils Absolute 07/07/2022 0 06          Radiology Results:   CT abdomen pelvis wo contrast    Result Date: 7/8/2022  Narrative: CT ABDOMEN AND PELVIS WITHOUT IV CONTRAST INDICATION:   Vesicointestinal fistula Intra-abdominal abscess f/u intrabdominal abscess , fistula  COMPARISON:  7/5/2022  TECHNIQUE:  CT examination of the abdomen and pelvis was performed without intravenous contrast  This examination was performed without intravenous contrast in the context of the critical nationwide Omnipaque shortage  Axial, sagittal, and coronal 2D reformatted images were created from the source data and submitted for interpretation  Radiation dose length product (DLP) for this visit:  625 mGy-cm   This examination, like all CT scans performed in the [facility], was performed utilizing techniques to minimize radiation dose exposure, including the use of iterative reconstruction and automated exposure control  Enteric contrast was administered  FINDINGS: ABDOMEN LOWER CHEST:  No clinically significant abnormality identified in the visualized lower chest  LIVER/BILIARY TREE:  1 1 cm hypodensity within the right hepatic lobe demonstrates internal attenuation most compatible with a small cyst   Liver is otherwise unremarkable  GALLBLADDER:  No calcified gallstones  No pericholecystic inflammatory change  SPLEEN:  Unremarkable  PANCREAS:  Unremarkable  ADRENAL GLANDS:  Unremarkable  KIDNEYS/URETERS:  Unremarkable  No hydronephrosis  Evaluation for solid renal lesions is limited by lack of IV contrast  STOMACH AND BOWEL:  No abnormal small bowel dilatation  Multiple colonic diverticuli are again seen with a short to intermediate segment of wall thickening and pericolonic inflammatory changes involving the sigmoid colon most compatible with acute diverticulitis  Air-fluid collection is again seen adjacent to the sigmoid colon measuring 3 6 x 2 8 x 2 4 cm (2:80, 601:85)  This is not appreciable change in size from recent CT of 7/5/2022  A small fistulous tract is suggested coursing between this  collection and the adjacent sigmoid colon (602:117 - 124)  Evaluation of this area is limited by artifact  There are inflammatory changes with a questionable fistulous tract extending inferiorly from this air-fluid collection to the posterior dome of the bladder (602:115)  There is, however, no enteric contrast or air within the bladder lumen to confirm communication  APPENDIX: There is mild wall thickening of the distal appendix which is favored to represent reactive inflammatory changes due to its proximity to the inflamed portion of the sigmoid colon  Enteric contrast is seen filling a majority of the appendiceal lumen  ABDOMINOPELVIC CAVITY:  Air-fluid collection adjacent sigmoid colon as described above  No pneumoperitoneum  No significant ascites  VESSELS:  Unremarkable for patient's age  PELVIS REPRODUCTIVE ORGANS:  Prostate is mildly enlarged  URINARY BLADDER:  The urinary bladder walls are thickened, possibly representing a reactive cystitis  Inflammatory changes from adjacent sigmoid diverticulitis and air-fluid collection are seen abutting the posterior dome of the bladder  There is a questionable fistula extending from the air-fluid collection to the bladder, however, there is no enteric contrast or air within the bladder lumen  ABDOMINAL WALL/INGUINAL REGIONS:  Unremarkable  OSSEOUS STRUCTURES:  No acute fracture or destructive osseous lesion  Degenerative changes are noted within the thoracolumbar spine with Schmorl's node involving the superior endplate of L5  There is minimal retrolisthesis of L5 on S1, stable  Right hip prosthesis is noted  Impression: 1  Redemonstration of acute sigmoid diverticulitis with adjacent air-fluid collection concerning for abscess  The sigmoid wall thickening and adjacent inflammatory changes appear mildly improved from recent CT while the air-fluid collection is stable in size    Inflammatory changes and a questionable fistula extend inferiorly from this collection towards the posterior dome of the bladder, however, no enteric contrast or air is present within the bladder lumen to confirm communication  This can be further evaluated utilizing a CT cystogram  2   Circumferential wall thickening of the urinary bladder and appendix, both favored to represent reactive inflammatory changes given their proximity to the findings described in impression #1  The study was marked in EPIC for significant notification  Workstation performed: [id number]     CT abdomen pelvis wo contrast    Result Date: 7/5/2022  Narrative: CT ABDOMEN AND PELVIS WITHOUT IV CONTRAST INDICATION:   pain  COMPARISON:  None  TECHNIQUE:  CT examination of the abdomen and pelvis was performed without intravenous contrast  This examination was performed without intravenous contrast in the context of the critical nationwide Omnipaque shortage  Axial, sagittal, and coronal 2D reformatted images were created from the source data and submitted for interpretation  Radiation dose length product (DLP) for this visit:  531 mGy-cm   This examination, like all CT scans performed in the [facility], was performed utilizing techniques to minimize radiation dose exposure, including the use of iterative reconstruction and automated exposure control  Enteric contrast was administered  FINDINGS: ABDOMEN LOWER CHEST:  No clinically significant abnormality identified in the visualized lower chest  LIVER/BILIARY TREE:  Unremarkable  GALLBLADDER:  No calcified gallstones  No pericholecystic inflammatory change  SPLEEN:  Unremarkable  PANCREAS:  Unremarkable  ADRENAL GLANDS:  Unremarkable  KIDNEYS/URETERS:  Unremarkable  No hydronephrosis  STOMACH AND BOWEL:  Sigmoid diverticular disease with inflammatory change concerning for diverticulitis  There appears to be an adjacent collection best seen on series 2 image 81 with air-fluid level measuring up to 2 7 x 3 7 cm worrisome for abscess collection    This questionably communicates with the bladder concerning for fistulization  APPENDIX:  No findings to suggest appendicitis  ABDOMINOPELVIC CAVITY:  No ascites  No pneumoperitoneum  No lymphadenopathy  VESSELS:  Unremarkable for patient's age  PELVIS REPRODUCTIVE ORGANS:  Unremarkable for patient's age  URINARY BLADDER:  Unremarkable  ABDOMINAL WALL/INGUINAL REGIONS:  Unremarkable  OSSEOUS STRUCTURES:  No acute fracture or destructive osseous lesion  Impression: Acute sigmoid diverticulitis with adjacent collection exhibiting air-fluid level measuring 2 7 x 3 7 cm concerning for abscess  This collection may also communicate with the adjacent bladder worrisome for fistulization  IV and oral contrast-enhanced follow-up exam including delayed images for assessment of the bladder is advised  The study was marked in EPIC for significant notification  Workstation performed: [id number]     US bedside procedure    Result Date: 7/5/2022  Narrative: 1 [id number]  2 [id number] 1

## 2022-07-27 ENCOUNTER — TELEPHONE (OUTPATIENT)
Dept: GASTROENTEROLOGY | Facility: AMBULARY SURGERY CENTER | Age: 57
End: 2022-07-27

## 2022-07-27 DIAGNOSIS — K57.92 DIVERTICULITIS: Primary | ICD-10-CM

## 2022-07-27 NOTE — TELEPHONE ENCOUNTER
Called and spoke to patient  As recommended by Dr Issac Ni, repeat CT scan ordered for a few weeks prior to his colonoscopy to ensure resolution of abscess and diverticulitis prior to proceeding with colonoscopy  Patient was given central scheduling number and will call in the next few days to try to get this set up

## 2022-07-28 ENCOUNTER — TELEPHONE (OUTPATIENT)
Dept: SURGERY | Facility: CLINIC | Age: 57
End: 2022-07-28

## 2022-07-28 DIAGNOSIS — K65.1 ABDOMINOPELVIC ABSCESS (HCC): ICD-10-CM

## 2022-07-28 DIAGNOSIS — K57.92 DIVERTICULITIS: Primary | ICD-10-CM

## 2022-07-28 NOTE — TELEPHONE ENCOUNTER
I was notified by GI yesterday afternoon that patient experiencing some bladder pressure  Suggested that he get a CT scan about a month out from his hospitalization which were taken into early August     I called Mr Mckenna Perales and left a voicemail stating he should get this CT scan next week and then follow-up in our office for clinical evaluation    He may call he has questions or concerns

## 2022-08-09 ENCOUNTER — HOSPITAL ENCOUNTER (OUTPATIENT)
Dept: RADIOLOGY | Age: 57
Discharge: HOME | End: 2022-08-09
Payer: COMMERCIAL

## 2022-08-09 DIAGNOSIS — K65.1 ABDOMINOPELVIC ABSCESS (HCC): ICD-10-CM

## 2022-08-09 DIAGNOSIS — K57.92 DIVERTICULITIS: ICD-10-CM

## 2022-08-09 PROCEDURE — 74177 CT ABD & PELVIS W/CONTRAST: CPT

## 2022-08-09 RX ADMIN — IOHEXOL 100 ML: 350 INJECTION, SOLUTION INTRAVENOUS at 14:51

## 2022-08-10 ENCOUNTER — TELEPHONE (OUTPATIENT)
Dept: SURGERY | Facility: CLINIC | Age: 57
End: 2022-08-10

## 2022-08-10 NOTE — TELEPHONE ENCOUNTER
I called Mr Rogelio De La Rosa this morning with his CT scan results  Abscess is getting smaller  Clinically he is back to his normal self  No longer has abdominal pain or bladder pressure  Recommend he take a high-fiber diet with daily fiber supplements  He should drink plenty of fluids on a daily basis as well    Has a colonoscopy scheduled in November with GI  He may call with questions or concerns

## 2022-11-07 ENCOUNTER — TELEPHONE (OUTPATIENT)
Dept: GASTROENTEROLOGY | Facility: CLINIC | Age: 57
End: 2022-11-07

## 2022-11-07 NOTE — TELEPHONE ENCOUNTER
Patients GI provider:  Dr Pineda Rhodes    Number to return call: [phone]    Reason for call: Pt calling because he has had a sinus infection for the last week  He is taking antibiotics, and said today is the first day he feels a little better  He still has congestion   He is unsure if he would need to reschedule    Scheduled procedure/appointment date if applicable: Procedure 25/32/24

## 2022-11-07 NOTE — TELEPHONE ENCOUNTER
Patient continues on antibiotics and states will reschedule  I made him aware someone from our office would reach out to reschedule

## 2022-11-07 NOTE — TELEPHONE ENCOUNTER
If patient is off antibiotics and feeling better without any fevers/chills then he can proceed as scheduled  Otherwise if he will still be on antibiotics, having fevers/chills or not feeling well then he should reschedule for another date

## 2023-02-09 ENCOUNTER — TELEPHONE (OUTPATIENT)
Dept: GASTROENTEROLOGY | Facility: CLINIC | Age: 58
End: 2023-02-09

## 2023-05-04 ENCOUNTER — ANESTHESIA EVENT (OUTPATIENT)
Dept: GASTROENTEROLOGY | Facility: HOSPITAL | Age: 58
End: 2023-05-04

## 2023-05-04 ENCOUNTER — ANESTHESIA (OUTPATIENT)
Dept: GASTROENTEROLOGY | Facility: HOSPITAL | Age: 58
End: 2023-05-04

## 2023-05-04 ENCOUNTER — HOSPITAL ENCOUNTER (OUTPATIENT)
Dept: GASTROENTEROLOGY | Facility: HOSPITAL | Age: 58
Setting detail: OUTPATIENT SURGERY
Discharge: HOME | End: 2023-05-04
Admitting: INTERNAL MEDICINE

## 2023-05-04 VITALS
RESPIRATION RATE: 19 BRPM | HEART RATE: 59 BPM | SYSTOLIC BLOOD PRESSURE: 154 MMHG | TEMPERATURE: 97.5 F | HEIGHT: 72 IN | WEIGHT: 215 LBS | OXYGEN SATURATION: 99 % | BODY MASS INDEX: 29.12 KG/M2 | DIASTOLIC BLOOD PRESSURE: 87 MMHG

## 2023-05-04 DIAGNOSIS — K57.92 DIVERTICULITIS: ICD-10-CM

## 2023-05-04 RX ORDER — PROPOFOL 10 MG/ML
INJECTION, EMULSION INTRAVENOUS AS NEEDED
Status: DISCONTINUED | OUTPATIENT
Start: 2023-05-04 | End: 2023-05-04

## 2023-05-04 RX ORDER — PROPOFOL 10 MG/ML
INJECTION, EMULSION INTRAVENOUS CONTINUOUS PRN
Status: DISCONTINUED | OUTPATIENT
Start: 2023-05-04 | End: 2023-05-04

## 2023-05-04 RX ORDER — SODIUM CHLORIDE, SODIUM LACTATE, POTASSIUM CHLORIDE, CALCIUM CHLORIDE 600; 310; 30; 20 MG/100ML; MG/100ML; MG/100ML; MG/100ML
INJECTION, SOLUTION INTRAVENOUS CONTINUOUS PRN
Status: DISCONTINUED | OUTPATIENT
Start: 2023-05-04 | End: 2023-05-04

## 2023-05-04 RX ADMIN — PROPOFOL 100 MCG/KG/MIN: 10 INJECTION, EMULSION INTRAVENOUS at 13:16

## 2023-05-04 RX ADMIN — SODIUM CHLORIDE, SODIUM LACTATE, POTASSIUM CHLORIDE, AND CALCIUM CHLORIDE: .6; .31; .03; .02 INJECTION, SOLUTION INTRAVENOUS at 13:07

## 2023-05-04 RX ADMIN — PROPOFOL 100 MG: 10 INJECTION, EMULSION INTRAVENOUS at 13:16

## 2023-05-04 NOTE — ANESTHESIA PREPROCEDURE EVALUATION
Procedure:  COLONOSCOPY    Relevant Problems   ANESTHESIA (within normal limits)      Other   (+) Diverticulitis        Physical Exam    Airway    Mallampati score: II  TM Distance: >3 FB  Neck ROM: full     Dental       Cardiovascular  Rhythm: regular, Rate: normal, Cardiovascular exam normal    Pulmonary  Pulmonary exam normal Breath sounds clear to auscultation,     Other Findings        Anesthesia Plan  ASA Score- 1     Anesthesia Type- IV sedation with anesthesia with ASA Monitors  Additional Monitors:   Airway Plan:           Plan Factors-Exercise tolerance (METS): >4 METS  Chart reviewed  Existing labs reviewed  Patient summary reviewed  Patient is not a current smoker  Induction-     Postoperative Plan-     Informed Consent- Anesthetic plan and risks discussed with patient  I personally reviewed this patient with the CRNA  Discussed and agreed on the Anesthesia Plan with the CRNA  Hermes Aaron

## 2023-05-04 NOTE — ANESTHESIA POSTPROCEDURE EVALUATION
Post-Op Assessment Note    CV Status:  Stable  Pain Score: 0    Pain management: adequate     Mental Status:  Alert and awake   Hydration Status:  Stable   PONV Controlled:  None   Airway Patency:  Patent      Post Op Vitals Reviewed: Yes      Staff: CRNA         No notable events documented      /91 (05/04/23 1340)    Temp 97 5 °F (36 4 °C) (05/04/23 1340)    Pulse 59 (05/04/23 1340)   Resp 15 (05/04/23 1340)    SpO2 98 % (05/04/23 1340)

## 2023-05-04 NOTE — H&P
History and Physical - [facility]  Mono Fitzgerald 65 y o  male MRN: [id number]    HPI: Reji Cool is a 65y o  year old male who presents with hx of diverticulitis  Review of Systems    Historical Information   Past Medical History:   Diagnosis Date   • Arthritis    • Diverticulitis of colon    • Fracture     right leg   • Hyperlipidemia     diet controlled   • Rotator cuff tear     bilateral   • Snoring      Past Surgical History:   Procedure Laterality Date   • COLONOSCOPY     • HIP ARTHROPLASTY Right    • JOINT REPLACEMENT      total r hip   • KNEE ARTHROSCOPY Bilateral     l x1 r x2   • GA COLONOSCOPY FLX DX W/COLLJ SPEC WHEN PFRMD N/A 2016    Procedure: COLONOSCOPY;  Surgeon: Kamran Gould MD;  Location: [facility];   Service: Gastroenterology   • GA REPAIR UMBILICAL WWDG,1+S/X,ZQMEZ N/A 2022    Procedure: UMBILICAL HERNIA REPAIR;  Surgeon: Giles Monroy DO;  Location: [facility];  Service: General   • GA SHLDR ARTHROSCOP,SURG,W/ROTAT CUFF REPR Right 2018    Procedure: SHOULDER ARTHROSCOPIC ROTATOR CUFF REPAIR;  Surgeon: Kelley Arrington MD;  Location[facility];  Service: Orthopedics   • GA WRIST Whitney Alton LIG Right 2016    Procedure: ENDOSCOPIC CARPAL TUNNEL RELEASE ;  Surgeon: Chapis Brooks MD;  [facility];  Service: Orthopedics   • ROTATOR CUFF REPAIR     • WISDOM TOOTH EXTRACTION       Social History   Social History     Substance and Sexual Activity   Alcohol Use Yes    Comment: socially     Social History     Substance and Sexual Activity   Drug Use No     Social History     Tobacco Use   Smoking Status Former   • Types: Cigarettes   • Quit date:    • Years since quittin 3   Smokeless Tobacco Never     Family History   Problem Relation Age of Onset   • No Known Problems Family        Meds/Allergies     (Not in a hospital admission)      No Known Allergies    Objective     There were no vitals taken for this visit  PHYSICAL EXAM    Gen: NAD  CV: RRR  CHEST: Clear  ABD: soft, NT/ND  EXT: no edema  Neuro: AAO      ASSESSMENT/PLAN:  This is a 65y o  year old male here for hx of diverticulitis         PLAN:   Procedure: colonoscopy

## 2023-05-30 ENCOUNTER — TELEPHONE (OUTPATIENT)
Dept: SLEEP CENTER | Facility: CLINIC | Age: 58
End: 2023-05-30

## 2023-05-30 NOTE — TELEPHONE ENCOUNTER
----- Message from Anival Mckeon MD sent at 5/28/2023 10:15 AM EDT -----  approved  ----- Message -----  From: Elwood Lefort  Sent: 5/24/2023   8:02 AM EDT  To: Sleep Medicine Art Provider    This Home sleep study needs approval      If approved please sign and return to clerical pool  If denied please include reasons why  Also provide alternative testing if warranted  Please sign and return to clerical pool

## 2023-07-11 ENCOUNTER — HOSPITAL ENCOUNTER (OUTPATIENT)
Dept: SLEEP CENTER | Facility: CLINIC | Age: 58
Discharge: HOME | End: 2023-07-11
Payer: COMMERCIAL

## 2023-07-11 DIAGNOSIS — R06.83 SNORING: ICD-10-CM

## 2023-07-11 DIAGNOSIS — G47.30 SLEEP APNEA, UNSPECIFIED TYPE: ICD-10-CM

## 2023-07-11 PROCEDURE — G0399 HOME SLEEP TEST/TYPE 3 PORTA: HCPCS

## 2023-07-11 NOTE — PROGRESS NOTES
Home Sleep Study Documentation    HOME STUDY DEVICE: Noxturnal yes                                           Aleena G3 no      Pre-Sleep Home Study:    Set-up and instructions performed by: MM    Technician performed demonstration for Patient: yes    Return demonstration performed by Patient: yes    Written instructions provided to Patient: yes    Patient signed consent form: yes        Post-Sleep Home Study:    Additional comments by Patient: None    Home Sleep Study Failed:no:    Failure reason: N/A    Reported or Detected: N/A    Scored by: CHERI Fermin

## 2023-07-21 ENCOUNTER — TELEPHONE (OUTPATIENT)
Dept: SLEEP CENTER | Facility: CLINIC | Age: 58
End: 2023-07-21

## 2023-07-21 NOTE — TELEPHONE ENCOUNTER
Sleep study resulted and shows moderate sleep apnea. Left message for patient to call office to review sleep study results. Per study order, Dr. Timi Denis PCP requests follow up with sleep specialist.     Needs to schedule consult.

## 2023-09-21 ENCOUNTER — EVALUATION (OUTPATIENT)
Dept: PHYSICAL THERAPY | Facility: CLINIC | Age: 58
End: 2023-09-21
Payer: COMMERCIAL

## 2023-09-21 DIAGNOSIS — M17.11 PRIMARY OSTEOARTHRITIS OF RIGHT KNEE: ICD-10-CM

## 2023-09-21 DIAGNOSIS — G89.29 CHRONIC PAIN OF RIGHT KNEE: Primary | ICD-10-CM

## 2023-09-21 DIAGNOSIS — M25.561 CHRONIC PAIN OF RIGHT KNEE: Primary | ICD-10-CM

## 2023-09-21 PROCEDURE — 97161 PT EVAL LOW COMPLEX 20 MIN: CPT | Performed by: PHYSICAL THERAPIST

## 2023-09-21 PROCEDURE — 97112 NEUROMUSCULAR REEDUCATION: CPT | Performed by: PHYSICAL THERAPIST

## 2023-09-21 NOTE — PROGRESS NOTES
PT Evaluation     Today's date: 2023  Patient name: Annetta Stout  : 1965  MRN: [id number]  Referring provider: Carmenza Melissa MD  Dx:   Encounter Diagnosis     ICD-10-CM    1. Chronic pain of right knee  M25.561     G89.29       2. Primary osteoarthritis of right knee  M17.11           Start Time: 1650  Stop Time: 1730  Total time in clinic (min): 40 minutes    Assessment  Assessment details: Annetta Stout is a pleasant 63 y.o. male who presents for pre-op evaluation for R TKA to be performed 11/15/23. The patient's greatest concerns are the pain he is experiencing and fear of not being able to keep active. Pt plans to attend one additional pre-hab visit prior to TKA. No further referral appears necessary at this time based upon examination results. Primary movement impairment diagnosis of expected limitations in right knee ROM and mm strength requiring the above surgical procedure resulting in pathoanatomical symptoms of right knee pain and limiting his ability to exercise or recreation, lift, perform household chores, sit, stand, walk and perform stair mobility. Primary Impairments:  1) Decreased active and passive ROM of the right knee  2) Decreased mm strength of the right knee and LE     Etiologic factors include repetitive poor body mechanics. Impairments: abnormal gait, abnormal or restricted ROM, activity intolerance, impaired balance, impaired physical strength, lacks appropriate home exercise program, pain with function and poor body mechanics    Symptom irritability: moderateUnderstanding of Dx/Px/POC: good   Prognosis: good  Prognosis details: Positive prognostic indicators include positive attitude toward recovery and good understanding of diagnosis and treatment plan options. Negative prognostic indicators include chronicity of symptoms and high symptom irritability.       Goals  Patient will be independent with home exercise program.   Patient will be able to manage symptoms independently. Short Term Goals 2-4 wks   1. Pt will be independent with initial HEP   2. Pt will decrease pain in the right knee to < 0/10 at worst   3. Pt will improve right knee ROM to 0-120*     Long Term Goals 6-8 wks  1. Pt will improve mm strength of the right LE to 5/5 t/o   2. Pt will demonstrate WNL gait mechanics without compensation   3. Pt will improve FOTO score to greater than expected by d/c      Plan  Patient would benefit from: skilled physical therapy  Planned modality interventions: Modalities PRN. Planned therapy interventions: activity modification, manual therapy, neuromuscular re-education, patient education, therapeutic activities, therapeutic exercise, graded activity, home exercise program, behavior modification, self care, joint mobilization, balance and gait training  Duration in visits: 2  Duration in weeks: 6  Plan of Care expiration date: 11/2/2023  Treatment plan discussed with: patient        Subjective Evaluation    History of Present Illness  Mechanism of injury: Mat Dowell presents with c/c of chronic R knee pain and pre-op evaluation for right TKA to be performed 11/15/23. Symptoms chronic in nature with no significant improvement noted with conservative treatment. Per MD's orders, pt to attend 2 pre-hab sessions prior to TKA on 11/15. Pt also plans to have left TKA after successful recovery from right TKA.    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[employer], currently works from home)   Sleeping: no disturbed sleep reported related to the knees   Patient concerns: decreasing pain, improving function, returning to PLOF, successful recovery from surgery   Special Questions: denies N/T, (-) catching/locking           Objective     Active Range of Motion   Left Hip   Flexion: WFL  Abduction: [facility]  [facility]  Internal rotation (90/90): 15 degrees     Right Hip   Flexion: WFL  Abduction: WFL  External rotation (90/90):  [facility]  Internal rotation (90/90): 15 degrees   Left Knee   Flexion: 120 degrees   Extension: 3 degrees     Right Knee   Flexion: 115 degrees   Extension: 0 degrees     Strength/Myotome Testing     Left Hip   Planes of Motion   Flexion: 5  Extension: 5  Abduction: 5  Adduction: 5  External rotation: 5  Internal rotation: 5    Right Hip   Planes of Motion   Flexion: 4+  Extension: 4+  Abduction: 4+  Adduction: 5  External rotation: 4+  Internal rotation: 5    Left Knee   Flexion: 5  Extension: 5    Right Knee   Flexion: 5  Extension: 5    Ambulation     Ambulation: Stairs   Ascend stairs: independent  Pattern: reciprocal  Railings: one rail  Descend stairs: independent  Pattern: reciprocal  Railings: one rail             Precautions: Hx of Right JESSA     Access Code: [email or web address]   Re-Eval Due: 11/2/23  Auth Expiration: BOMN      Manuals 9/21            R Knee/LE ROM & Stretching                                        Neuro Re-Ed             SLR             Bridge              [facility]                                       Education  S/S, POC, HEP             Ther Ex             Active warm up             LAQ              Standing Hip 3-way              LE Self Stretch                                       Ther Activity             Squat              Step up             Lunge              Hinge                                        Gait Training                                       Modalities

## 2023-09-21 NOTE — LETTER
[id number]    Phu Palomino MD  [address] [place] [address]    Patient: Rosanna Parnell   YOB: 1965   Date of Visit: 2023     Encounter Diagnosis     ICD-10-CM    1. Chronic pain of right knee  M25.561     G89.29       2. Primary osteoarthritis of right knee  M17.11           Dear Dr. Ankush Wesley: Thank you for your recent referral of Rosanna Parnell. Please review the attached evaluation summary from Edward's recent visit. Please verify that you agree with the plan of care by signing the attached order. If you have any questions or concerns, please do not hesitate to call. I sincerely appreciate the opportunity to share in the care of one of your patients and hope to have another opportunity to work with you in the near future. Sincerely,    Matthew Castro, PT      Referring Provider:      I certify that I have read the below Plan of Care and certify the need for these services furnished under this plan of treatment while under my care. Phu Palomino MD  [address] [place] [address] Dr  Via Fax: [phone]          PT Evaluation     Today's date: 2023  Patient name: Rosanna Parnell  : 1965  MRN: [id number]  Referring provider: Phu Palomino MD  Dx:   Encounter Diagnosis     ICD-10-CM    1. Chronic pain of right knee  M25.561     G89.29       2. Primary osteoarthritis of right knee  M17.11           Start Time: 1650  Stop Time: 1730  Total time in clinic (min): 40 minutes    Assessment  Assessment details: Rosanna Parnell is a pleasant 63 y.o. male who presents for pre-op evaluation for R TKA to be performed 11/15/23. The patient's greatest concerns are the pain he is experiencing and fear of not being able to keep active. Pt plans to attend one additional pre-hab visit prior to TKA. No further referral appears necessary at this time based upon examination results.     Primary movement impairment diagnosis of expected limitations in right knee ROM and mm strength requiring the above surgical procedure resulting in pathoanatomical symptoms of right knee pain and limiting his ability to exercise or recreation, lift, perform household chores, sit, stand, walk and perform stair mobility. Primary Impairments:  1) Decreased active and passive ROM of the right knee  2) Decreased mm strength of the right knee and LE     Etiologic factors include repetitive poor body mechanics. Impairments: abnormal gait, abnormal or restricted ROM, activity intolerance, impaired balance, impaired physical strength, lacks appropriate home exercise program, pain with function and poor body mechanics    Symptom irritability: moderateUnderstanding of Dx/Px/POC: good   Prognosis: good  Prognosis details: Positive prognostic indicators include positive attitude toward recovery and good understanding of diagnosis and treatment plan options. Negative prognostic indicators include chronicity of symptoms and high symptom irritability. Goals  Patient will be independent with home exercise program.   Patient will be able to manage symptoms independently. Short Term Goals 2-4 wks   1. Pt will be independent with initial HEP   2. Pt will decrease pain in the right knee to < 0/10 at worst   3. Pt will improve right knee ROM to 0-120*     Long Term Goals 6-8 wks  1. Pt will improve mm strength of the right LE to 5/5 t/o   2. Pt will demonstrate WNL gait mechanics without compensation   3. Pt will improve FOTO score to greater than expected by d/c      Plan  Patient would benefit from: skilled physical therapy  Planned modality interventions: Modalities PRN.   Planned therapy interventions: activity modification, manual therapy, neuromuscular re-education, patient education, therapeutic activities, therapeutic exercise, graded activity, home exercise program, behavior modification, self care, joint mobilization, balance and gait training  Duration in visits: 2  Duration in weeks: 6  Plan of Care expiration date: 11/2/2023  Treatment plan discussed with: patient        Subjective Evaluation    History of Present Illness  Mechanism of injury: Javad Boyer presents with c/c of chronic R knee pain and pre-op evaluation for right TKA to be performed 11/15/23. Symptoms chronic in nature with no significant improvement noted with conservative treatment. Per MD's orders, pt to attend 2 pre-hab sessions prior to TKA on 11/15. Pt also plans to have left TKA after successful recovery from right TKA. 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, currently works from home)   Sleeping: no disturbed sleep reported related to the knees   Patient concerns: decreasing pain, improving function, returning to PLOF, successful recovery from surgery   Special Questions: denies N/T, (-) catching/locking           Objective     Active Range of Motion   Left Hip   Flexion: WFL  Abduction: WFL  [facility]90/90): [facility]  Internal rotation (90/90): 15 degrees     Right Hip   Flexion: WFL  Abduction: WFL  External rotation (90/90):  [facility]  Internal rotation (90/90): 15 degrees   Left Knee   Flexion: 120 degrees   Extension: 3 degrees     Right Knee   Flexion: 115 degrees   Extension: 0 degrees     Strength/Myotome Testing     Left Hip   Planes of Motion   Flexion: 5  Extension: 5  Abduction: 5  Adduction: 5  External rotation: 5  Internal rotation: 5    Right Hip   Planes of Motion   Flexion: 4+  Extension: 4+  Abduction: 4+  Adduction: 5  External rotation: 4+  Internal rotation: 5    Left Knee   Flexion: 5  Extension: 5    Right Knee   Flexion: 5  Extension: 5    Ambulation     Ambulation: Stairs   Ascend stairs: independent  Pattern: reciprocal  Railings: one rail  Descend stairs: independent  Pattern: reciprocal  Railings: one rail            Precautions: Hx of Right JESSA     Access Code: [email or web address]   Re-Eval Due: 11/2/23  Auth Expiration: BOMN      Manuals 9/21            R Knee/LE ROM & Stretching                                        Neuro Re-Ed             SLR             Bridge              [facility]                                       Education  S/S, POC, HEP             Ther Ex             Active warm up             LAQ              Standing Hip 3-way              LE Self Stretch                                       Ther Activity             Squat              Step up             Lunge              Hinge                                        Gait Training                                       Modalities

## 2023-10-11 ENCOUNTER — OFFICE VISIT (OUTPATIENT)
Dept: PHYSICAL THERAPY | Facility: CLINIC | Age: 58
End: 2023-10-11
Payer: COMMERCIAL

## 2023-10-11 DIAGNOSIS — G89.29 CHRONIC PAIN OF RIGHT KNEE: Primary | ICD-10-CM

## 2023-10-11 DIAGNOSIS — M17.11 PRIMARY OSTEOARTHRITIS OF RIGHT KNEE: ICD-10-CM

## 2023-10-11 DIAGNOSIS — M25.561 CHRONIC PAIN OF RIGHT KNEE: Primary | ICD-10-CM

## 2023-10-11 PROCEDURE — 97112 NEUROMUSCULAR REEDUCATION: CPT | Performed by: PHYSICAL THERAPIST

## 2023-10-11 PROCEDURE — 97530 THERAPEUTIC ACTIVITIES: CPT | Performed by: PHYSICAL THERAPIST

## 2023-10-11 PROCEDURE — 97110 THERAPEUTIC EXERCISES: CPT | Performed by: PHYSICAL THERAPIST

## 2023-10-11 NOTE — PROGRESS NOTES
Daily Note     Today's date: 10/11/2023  Patient name: Rebekah Morillo  : 1965  MRN: [id number]  Referring provider: Alicia Bustamante MD  Dx:   Encounter Diagnosis     ICD-10-CM    1. Chronic pain of right knee  M25.561     G89.29       2. Primary osteoarthritis of right knee  M17.11           Start Time: 1700  Stop Time: 1740  Total time in clinic (min): 40 minutes    Subjective: Pt with no new concerns noted at this time. No difficulty reported with HEP       Objective: See treatment diary below      Assessment: Tolerated treatment well. Updated and reviewed HEP with the patient verbalizing understanding. Increased pain reported with squats and steps. Pt demonstrates good mobility in the right knee and LE. Pt advised to contact clinic prior to surgery if additional concerns arise. Patient would benefit from continued PT      Plan: Continue per plan of care.       Precautions: Hx of Right JESSA     Access Code: [email or web address]   Re-Eval Due: 23  Auth Expiration: BOMN      Manuals 9/21 10/11           R Knee/LE ROM & Stretching   KCB                                     Neuro Re-Ed             SLR             Bridge              [employer]              SLS  3x 10-15"                                     Education  S/S, POC, HEP  Update HEP           Ther Ex             Active warm up  Bike L8 6'           LAQ   20x            Standing Hip 3-way   20x ea RTB           LE Self Stretch                                       Ther Activity             Squat   20x            Step up             Lunge   20x            Hinge   5x w/ dowel  2x10 10# KB                                     Gait Training                                       Modalities

## 2023-11-16 ENCOUNTER — OFFICE VISIT (OUTPATIENT)
Dept: PHYSICAL THERAPY | Facility: CLINIC | Age: 58
End: 2023-11-16
Payer: COMMERCIAL

## 2023-11-16 DIAGNOSIS — M17.11 PRIMARY OSTEOARTHRITIS OF RIGHT KNEE: Primary | ICD-10-CM

## 2023-11-16 DIAGNOSIS — Z96.651 S/P TOTAL KNEE ARTHROPLASTY, RIGHT: ICD-10-CM

## 2023-11-16 PROCEDURE — 97162 PT EVAL MOD COMPLEX 30 MIN: CPT | Performed by: PHYSICAL THERAPIST

## 2023-11-16 NOTE — PROGRESS NOTES
PT Evaluation     Today's date: 2023  Patient name: Rebekah Morillo  : 1965  MRN: [id number]  Referring provider: Alicia Bustamante MD  Dx:   Encounter Diagnosis     ICD-10-CM    1. Primary osteoarthritis of right knee  M17.11       2. S/P total knee arthroplasty, right  Z96.651           Start Time: 0900  Stop Time: 0930  Total time in clinic (min): 30 minutes    Assessment  Assessment details: Rebekah Morillo is a pleasant 63 y.o. male who presents s/p right TKA performed 11/15/23. The patient's greatest concerns are the pain he is experiencing and fear of not being able to keep active. No further referral appears necessary at this time based upon examination results. Primary movement impairment diagnosis of expected limitations in RLE mm strength and mobility given the above surgical procedure resulting in pathoanatomical symptoms of right knee pain and limiting his ability to drive, exercise or recreation, get out of a chair, lift, perform household chores, stand, and walk. Primary Impairments:  1) Decreased active and passive ROM of the right knee and LE   2) Decreased mm strength of the right knee and LE  3) Impaired gait mechanics and stair mobility     Etiologic factors include none recalled by the patient. Impairments: abnormal gait, abnormal or restricted ROM, activity intolerance, impaired balance, impaired physical strength, lacks appropriate home exercise program, pain with function and weight-bearing intolerance    Symptom irritability: moderateUnderstanding of Dx/Px/POC: good   Prognosis: good  Prognosis details: Positive prognostic indicators include positive attitude toward recovery and good understanding of diagnosis and treatment plan options. Negative prognostic indicators include none. Goals  Patient will be independent with home exercise program.   Patient will be able to manage symptoms independently. Short Term Goals 2-4 wks   1.  Pt will be independent with initial HEP   2. Pt will decrease pain in the right knee to < 3/10 at worst   3. Pt will improve R knee ROM to 0-90* or better by 2wks post op    Long Term Goals 6-8 wks  1. Pt will improve R knee ROM to WNL and pain free   2. Pt will improve deficient mm strength in the RLE to 5/5 t/o   3. Pt will demonstrate WNL gait mechanics without compensation       Plan  Patient would benefit from: skilled physical therapy  Planned modality interventions: Modalities PRN. Planned therapy interventions: activity modification, manual therapy, neuromuscular re-education, patient education, therapeutic activities, therapeutic exercise, graded activity, home exercise program, behavior modification, self care, joint mobilization and balance  Frequency: 2x week  Duration in weeks: 8  Treatment plan discussed with: patient        Subjective Evaluation    History of Present Illness  Mechanism of injury: [place] presents s/p right TKA performed 11/15/23. Pt failed conservative treatment and elected to proceed with TKA. Pt was seen for 2 sessions of PT prior to TKA for LE strengthening and mobility. Next f/u with surgeon scheduled for 12/6. Pain location: right knee, descriptors: dull, sharp, and tight  Aggravating factors: weight bearing activities   Relieving factors: medication, ice   24hr pain pattern: 5/10 (current), 5/10 (best), 8-9/10 (worst)   Imaging: X-ray  Previous treatments: previous physical therapy  Patient concerns: decreasing pain, improving mobility, improving function, improving strength, and returning to all previous activities   Special Questions: (-) Red flag screening          Objective     Observations     Right Knee   Positive for incision.      Additional Observation Details  Surgical incision present over right anterior knee, no s/s of infection noted     Tenderness     Additional Tenderness Details  TTP over right quadriceps muscle   No tenderness in the right calf Neurological Testing     Sensation     Knee   Left Knee   Intact: Light touch    Right Knee   Intact: light touch     Active Range of Motion     Right Knee   Flexion: 45 degrees   Extension: -10 degrees   Extensor lag: 10 degrees     Strength/Myotome Testing     Right Knee   Quadriceps contraction: fair    Additional Strength Details  Assess at next visit     Swelling     Right Knee Girth Measurement (cm)   Joint line: 46 cm  10 cm above joint line: 48.5 cm  10 cm below joint line: 40 cm    Ambulation   Weight-Bearing Status   Weight-Bearing Status (Right): weight-bearing as tolerated    Assistive device used: two-wheeled walker    Observational Gait   Gait: antalgic   Decreased right stance time and right step length.               Diagnosis: R TKA 11/5   Precautions: N/A   POC: 11/16-12/28   Authorization: Ashok Wilson after 5th visit; 60 PCY   Access Code: [place]    Visit Count 1 2 3   (auth needed) 4 5   Manuals 11/16       R Knee/LE Stretching         Patellar Mobs                Neuro Re-Ed        [employer] Set         Glute Set         SLR        Bridge                 Education  S/S, POC, HEP       There Ex         Active warm up        Heel Slide         LAQ        Hip Abd Iso        Hip Add Iso                        Ther Act                                                 Modalities

## 2023-11-16 NOTE — LETTER
[id number]    Kana Perales MD  [address] [place] [id number]    Patient: Javad Boyer   YOB: 1965   Date of Visit: 2023     Encounter Diagnosis     ICD-10-CM    1. Primary osteoarthritis of right knee  M17.11       2. S/P total knee arthroplasty, right  F14.930           Dear Dr. Jef Sandy: Thank you for your recent referral of Javad Boyer. Please review the attached evaluation summary from Edward's recent visit. Please verify that you agree with the plan of care by signing the attached order. If you have any questions or concerns, please do not hesitate to call. I sincerely appreciate the opportunity to share in the care of one of your patients and hope to have another opportunity to work with you in the near future. Sincerely,    Josue Higgins, PT      Referring Provider:      I certify that I have read the below Plan of Care and certify the need for these services furnished under this plan of treatment while under my care. Kana Perales MD  [address] [place] [id number]  Via Fax: [phone]          PT Evaluation     Today's date: 2023  Patient name: Javad Boyer  : 1965  MRN: [id number]  Referring provider: Kana Perales MD  Dx:   Encounter Diagnosis     ICD-10-CM    1. Primary osteoarthritis of right knee  M17.11       2. S/P total knee arthroplasty, right  Z96.651           Start Time: 0900  Stop Time: 0930  Total time in clinic (min): 30 minutes    Assessment  Assessment details: Javad Boyer is a pleasant 63 y.o. male who presents s/p right TKA performed 11/15/23. The patient's greatest concerns are the pain he is experiencing and fear of not being able to keep active. No further referral appears necessary at this time based upon examination results.     Primary movement impairment diagnosis of expected limitations in RLE mm strength and mobility given the above surgical procedure resulting in pathoanatomical symptoms of right knee pain and limiting his ability to drive, exercise or recreation, get out of a chair, lift, perform household chores, stand, and walk. Primary Impairments:  1) Decreased active and passive ROM of the right knee and LE   2) Decreased mm strength of the right knee and LE  3) Impaired gait mechanics and stair mobility     Etiologic factors include none recalled by the patient. Impairments: abnormal gait, abnormal or restricted ROM, activity intolerance, impaired balance, impaired physical strength, lacks appropriate home exercise program, pain with function and weight-bearing intolerance    Symptom irritability: moderateUnderstanding of Dx/Px/POC: good   Prognosis: good  Prognosis details: Positive prognostic indicators include positive attitude toward recovery and good understanding of diagnosis and treatment plan options. Negative prognostic indicators include none. Goals  Patient will be independent with home exercise program.   Patient will be able to manage symptoms independently. Short Term Goals 2-4 wks   1. Pt will be independent with initial HEP   2. Pt will decrease pain in the right knee to < 3/10 at worst   3. Pt will improve R knee ROM to 0-90* or better by 2wks post op    Long Term Goals 6-8 wks  1. Pt will improve R knee ROM to WNL and pain free   2. Pt will improve deficient mm strength in the RLE to 5/5 t/o   3. Pt will demonstrate WNL gait mechanics without compensation       Plan  Patient would benefit from: skilled physical therapy  Planned modality interventions: Modalities PRN.   Planned therapy interventions: activity modification, manual therapy, neuromuscular re-education, patient education, therapeutic activities, therapeutic exercise, graded activity, home exercise program, behavior modification, self care, joint mobilization and balance  Frequency: 2x week  Duration in weeks: 8  Treatment plan discussed with: patient        Subjective Evaluation    History of Present Illness  Mechanism of injury: Rebekah Morillo presents s/p right TKA performed 11/15/23. Pt failed conservative treatment and elected to proceed with TKA. Pt was seen for 2 sessions of PT prior to TKA for LE strengthening and mobility. Next f/u with surgeon scheduled for 12/6. Pain location: right knee, descriptors: dull, sharp, and tight  Aggravating factors: weight bearing activities   Relieving factors: medication, ice   24hr pain pattern: 5/10 (current), 5/10 (best), 8-9/10 (worst)   Imaging: X-ray  Previous treatments: previous physical therapy  Patient concerns: decreasing pain, improving mobility, improving function, improving strength, and returning to all previous activities   Special Questions: (-) Red flag screening          Objective     Observations     Right Knee   Positive for incision. Additional Observation Details  Surgical incision present over right anterior knee, no s/s of infection noted     Tenderness     Additional Tenderness Details  TTP over right quadriceps muscle   No tenderness in the right calf     Neurological Testing     Sensation     Knee   Left Knee   Intact: Light touch    Right Knee   Intact: light touch     Active Range of Motion     Right Knee   Flexion: 45 degrees   Extension: -10 degrees   Extensor lag: 10 degrees     Strength/Myotome Testing     Right Knee   Quadriceps contraction: fair    Additional Strength Details  Assess at next visit     Swelling     Right Knee Girth Measurement (cm)   Joint line: 46 cm  10 cm above joint line: 48.5 cm  10 cm below joint line: 40 cm    Ambulation   Weight-Bearing Status   Weight-Bearing Status (Right): weight-bearing as tolerated    Assistive device used: two-wheeled walker    Observational Gait   Gait: antalgic   Decreased right stance time and right step length.               Diagnosis: R TKA 11/5   Precautions: N/A   POC: 11/16-12/28   Authorization: Auth after 5th visit; 60 PCY   Access Code: [facility]    Visit Count 1 2 3   (auth needed) 4 5   Manuals 11/16       R Knee/LE Stretching         Patellar Mobs                Neuro Re-Ed        Quad Set         Glute Set         SLR        Bridge                 Education  S/S, POC, HEP       There Ex         Active warm up        Heel Slide         LAQ        Hip Abd Iso        Hip Add Iso                        Ther Act                                                 Modalities

## 2023-11-21 ENCOUNTER — APPOINTMENT (OUTPATIENT)
Dept: PHYSICAL THERAPY | Facility: CLINIC | Age: 58
End: 2023-11-21
Payer: COMMERCIAL

## 2023-11-21 ENCOUNTER — OFFICE VISIT (OUTPATIENT)
Dept: PHYSICAL THERAPY | Facility: CLINIC | Age: 58
End: 2023-11-21
Payer: COMMERCIAL

## 2023-11-21 DIAGNOSIS — Z96.651 S/P TOTAL KNEE ARTHROPLASTY, RIGHT: ICD-10-CM

## 2023-11-21 DIAGNOSIS — M17.11 PRIMARY OSTEOARTHRITIS OF RIGHT KNEE: Primary | ICD-10-CM

## 2023-11-21 PROCEDURE — 97110 THERAPEUTIC EXERCISES: CPT

## 2023-11-21 PROCEDURE — 97140 MANUAL THERAPY 1/> REGIONS: CPT

## 2023-11-21 PROCEDURE — 97112 NEUROMUSCULAR REEDUCATION: CPT

## 2023-11-21 NOTE — PROGRESS NOTES
Daily Note     Today's date: 2023  Patient name: Aviva Loya  : 1965  MRN: [id number]  Referring provider: Jerry Ribeiro MD  Dx:   Encounter Diagnosis     ICD-10-CM    1. Primary osteoarthritis of right knee  M17.11       2. S/P total knee arthroplasty, right  Z96.651                      Subjective: Pt states he has not taken pain medication since Saturday, and notes pain at it's worst (and best) is 4/10. Pt states compliance w/ prescribed aspirin twice daily. Pt presents to PT today w/ SPC instead of RW. Pt denies calf pain. Objective: See treatment diary below      Assessment: Tolerated treatment fair. Patient would benefit from continued PT. Unable to perform LAQ, SAQ independently due to decreased strength. Pt given copy of HS in supine and seated for HEP. Increased knee discomfort noted w/ manual stretching, flexion most limited. R calf soft, non tender. Plan: Progress treatment as tolerated. Encouraged pt to elevate and ice at home to help alleviate swelling. Diagnosis: R TKA    Precautions: N/A   POC: -   Authorization: Santosh Galicia after 5th visit; 60 PCY   Access Code: [facility]    Visit Count 1 2 3   (auth needed) 4 5   Manuals       R Knee/LE Stretching   LM      Patellar Mobs                Neuro Re-Ed        Quad Set   20x3"      Glute Set   20x3"      SLR        Bridge                 Education  S/S, POC, HEP       There Ex         Active warm up  ?       Heel Slide   10x5" w/ strap      LAQ  unable      Hip Abd Iso  20x3"      Hip Add Iso   20x3"                     Ther Act                                                 Modalities             ice  8'

## 2023-11-27 ENCOUNTER — OFFICE VISIT (OUTPATIENT)
Dept: PHYSICAL THERAPY | Facility: CLINIC | Age: 58
End: 2023-11-27
Payer: COMMERCIAL

## 2023-11-27 DIAGNOSIS — Z96.651 S/P TOTAL KNEE ARTHROPLASTY, RIGHT: ICD-10-CM

## 2023-11-27 DIAGNOSIS — M17.11 PRIMARY OSTEOARTHRITIS OF RIGHT KNEE: Primary | ICD-10-CM

## 2023-11-27 PROCEDURE — 97110 THERAPEUTIC EXERCISES: CPT

## 2023-11-27 PROCEDURE — 97140 MANUAL THERAPY 1/> REGIONS: CPT

## 2023-11-27 PROCEDURE — 97112 NEUROMUSCULAR REEDUCATION: CPT

## 2023-11-27 NOTE — PROGRESS NOTES
Daily Note     Today's date: 2023  Patient name: Heena Armando  : 1965  MRN: [id number]  Referring provider: Neomia Runner, MD  Dx:   Encounter Diagnosis     ICD-10-CM    1. Primary osteoarthritis of right knee  M17.11       2. S/P total knee arthroplasty, right  Z96.651             Start Time: 1615  Stop Time: 1700  Total time in clinic (min): 45 minutes    Subjective: Patient reports significant improvements with R knee motion, symptoms, and swelling since last visit and has been feeling "better" lately. Objective: See treatment diary below      Assessment: Tolerated treatment well. Patient was successfully able to perform LAQs and SLR and displayed improvements in knee extension/quad activation needed to complete these exercises. Tightness noted with knee flexion>knee extension during PROM. Muscle fatigue present at the conclusion of session. Patient exhibited good technique with therapeutic exercises and would benefit from continued PT. Plan: Continue per plan of care. Progress treatment as tolerated. Diagnosis: R TKA    Precautions: N/A   POC: -   Authorization: Sulaiman Bran after 5th visit; 60 PCY   Access Code: [facility]    Visit Count 1 2 3   (auth needed) 4 5   Manuals       R Knee/LE Stretching   LM KA     Patellar Mobs                Neuro Re-Ed        Quad Set   20x3" 20x3"     Glute Set   20x3" 20x3"     SLR   10x     Bridge                 Education  S/S, POC, HEP       There Ex         Active warm up  ?       Heel Slide   10x5" w/ strap 10x5" w/ strap     LAQ  unable 10x     Hip Abd Iso  20x3" 20x3"     Hip Add Iso   20x3" 20x3"                    Ther Act                                                 Modalities             ice  8' 8'

## 2023-11-29 ENCOUNTER — OFFICE VISIT (OUTPATIENT)
Dept: PHYSICAL THERAPY | Facility: CLINIC | Age: 58
End: 2023-11-29
Payer: COMMERCIAL

## 2023-11-29 DIAGNOSIS — Z96.651 S/P TOTAL KNEE ARTHROPLASTY, RIGHT: ICD-10-CM

## 2023-11-29 DIAGNOSIS — M17.11 PRIMARY OSTEOARTHRITIS OF RIGHT KNEE: Primary | ICD-10-CM

## 2023-11-29 PROCEDURE — 97110 THERAPEUTIC EXERCISES: CPT

## 2023-11-29 PROCEDURE — 97140 MANUAL THERAPY 1/> REGIONS: CPT

## 2023-11-29 PROCEDURE — 97112 NEUROMUSCULAR REEDUCATION: CPT

## 2023-11-29 NOTE — PROGRESS NOTES
Daily Note     Today's date: 2023  Patient name: Debi Dakins  : 1965  MRN: [id number]  Referring provider: Danilo Dumas MD  Dx:   Encounter Diagnosis     ICD-10-CM    1. Primary osteoarthritis of right knee  M17.11       2. S/P total knee arthroplasty, right  Z96.651               Start Time: 1700  Stop Time: 1745  Total time in clinic (min): 45 minutes      Subjective: Patient reports no new complaints or major changes since last session. Objective: See treatment diary below      Assessment: Tolerated treatment well. Patient continued to display improvements in knee extension/quad activation during quad sets and SLRs and was able to tolerate increased reps today without any increase in pain. Tightness noted with knee flexion>knee extension during PROM, but patient demonstrated improvements in tolerance to PROM and did not have any episodes of muscle guarding throughout. Muscle fatigue present at the conclusion of session which improved after application of ice. Patient exhibited good technique with therapeutic exercises and would benefit from continued PT. Plan: Continue per plan of care. Progress treatment as tolerated. Diagnosis: R TKA    Precautions: N/A   POC: -   Authorization: Jaron Saavedra after 5th visit; 60 PCY   Access Code: [facility]    Visit Count 1 2 3   (auth needed) 4 5   Manuals     R Knee/LE Stretching   LM KA KA    Patellar Mobs                Neuro Re-Ed        Quad Set   20x3" 20x3" 20x3"    Glute Set   20x3" 20x3" 20x3"    SLR   10x 2x10    Bridge                 Education  S/S, POC, HEP       There Ex         Active warm up  ?       Heel Slide   10x5" w/ strap 10x5" w/ strap 15x5"    LAQ  unable 10x 3"x20    Hip Abd Iso  20x3" 20x3" 20x3"    Hip Add Iso   20x3" 20x3" 20x3"                   Ther Act                                                 Modalities             ice  8' 8' 5'

## 2023-12-04 ENCOUNTER — OFFICE VISIT (OUTPATIENT)
Dept: PHYSICAL THERAPY | Facility: CLINIC | Age: 58
End: 2023-12-04
Payer: COMMERCIAL

## 2023-12-04 DIAGNOSIS — Z96.651 S/P TOTAL KNEE ARTHROPLASTY, RIGHT: ICD-10-CM

## 2023-12-04 DIAGNOSIS — M17.11 PRIMARY OSTEOARTHRITIS OF RIGHT KNEE: Primary | ICD-10-CM

## 2023-12-04 PROCEDURE — 97112 NEUROMUSCULAR REEDUCATION: CPT

## 2023-12-04 PROCEDURE — 97110 THERAPEUTIC EXERCISES: CPT

## 2023-12-04 PROCEDURE — 97140 MANUAL THERAPY 1/> REGIONS: CPT

## 2023-12-04 NOTE — PROGRESS NOTES
Daily Note     Today's date: 2023  Patient name: Susan Simmons  : 1965  MRN: [id number]  Referring provider: Oanh Roberts MD  Dx:   Encounter Diagnosis     ICD-10-CM    1. Primary osteoarthritis of right knee  M17.11       2. S/P total knee arthroplasty, right  Z96.651                 Start Time: 1700  Stop Time: 1746  Total time in clinic (min): 46 minutes      Subjective: Patient reports improvements with motion and tolerance to exercise since last session and is happy with his progress so far since beginning PT. Objective: See treatment diary below      Assessment: Tolerated treatment well. Progressed patient by initiating warm up on upright bike which was initially difficult due to limited knee flexion/discomfort, but improved throughout duration and patient was eventually able to complete full revolutions. Added SLRs into abduction today which was tolerable for patient without any increase in pain, and just presence of hip abductor weakness. Patient continued to demonstrate improvements in tolerance to PROM and did not have any episodes of muscle guarding throughout. Added various standing LE exercises today which were all tolerable for patient, except for step ups which caused minimal onset of a quick, sharp R medial pattellar pain when pushing off of his R LE. Muscle fatigue present at the conclusion of session which improved after application of ice. Patient exhibited good technique with therapeutic exercises and would benefit from continued PT. Plan: Continue per plan of care. Progress treatment as tolerated.        Diagnosis: R TKA    Precautions: N/A   POC: -   Authorization: Geena Clayton after 5th visit; 60 PCY   Access Code: [facility]    Visit Count 1 2 3   (auth needed) 4 5   Manuals    R Knee/LE Stretching   LM KA KA KA   Patellar Mobs                Neuro Re-Ed        Quad Set   20x3" 20x3" 20x3" 20x3"   Glute Set   20x3" 20x3" 20x3" SLR   10x 2x10 Flex and abd   Bridge     10x            Education  S/S, POC, HEP       There Ex         Active warm up  ? P!  Upright bike L1 5'   Heel Slide   10x5" w/ strap 10x5" w/ strap 15x5" 20x5"   LAQ  unable 10x 3"x20 20x3"   Hip Abd Iso  20x3" 20x3" 20x3" GTB 20x3"   Hip Add Iso   20x3" 20x3" 20x3" 20x3"   Sidestepping     2 laps at // bars   Standing marches     10x B    Standing hip abd          10x B    Ther Act             Step ups     6" 10x                               Modalities             ice  8' 8' 5' 5'

## 2023-12-07 ENCOUNTER — OFFICE VISIT (OUTPATIENT)
Dept: PHYSICAL THERAPY | Facility: CLINIC | Age: 58
End: 2023-12-07
Payer: COMMERCIAL

## 2023-12-07 DIAGNOSIS — M17.11 PRIMARY OSTEOARTHRITIS OF RIGHT KNEE: Primary | ICD-10-CM

## 2023-12-07 DIAGNOSIS — Z96.651 S/P TOTAL KNEE ARTHROPLASTY, RIGHT: ICD-10-CM

## 2023-12-07 PROCEDURE — 97110 THERAPEUTIC EXERCISES: CPT | Performed by: PHYSICAL THERAPIST

## 2023-12-07 PROCEDURE — 97140 MANUAL THERAPY 1/> REGIONS: CPT | Performed by: PHYSICAL THERAPIST

## 2023-12-07 PROCEDURE — 97112 NEUROMUSCULAR REEDUCATION: CPT | Performed by: PHYSICAL THERAPIST

## 2023-12-07 NOTE — PROGRESS NOTES
Daily Note     Today's date: 2023  Patient name: Freddy Brandt  : 1965  MRN: [id number]  Referring provider: Dereje Mueller MD  Dx:   Encounter Diagnosis     ICD-10-CM    1. Primary osteoarthritis of right knee  M17.11       2. S/P total knee arthroplasty, right  Z96.651                              Subjective: Patient stated minimal pain prior to treatment session. Objective: See treatment diary below      Assessment: Patient performed progressions as listed without c/o pain; minimal pain with manual knee PROM. Plan: Continue per plan of care. Progress treatment as tolerated. Diagnosis: R TKA    Precautions: N/A   POC: -   Authorization: Katie Farley after 5th visit; 60 PCY   Access Code: [facility]    Visit Count 1 2 3   (auth needed) 4 5   Manuals    R Knee/LE Stretching  KK LM KA KA KA   Patellar Mobs KK               Neuro Re-Ed        Quad Set   20x3" 20x3" 20x3" 20x3"   Glute Set   20x3" 20x3" 20x3"    SLR Flex + abd 2x10 ea. 10x 2x10 Flex and abd   Bridge     10x            Education         There Ex         Active warm up Bike 5 min ? P!  Upright bike L1 5'   Heel Slide   10x5" w/ strap 10x5" w/ strap 15x5" 20x5"   LAQ 20x3" unable 10x 3"x20 20x3"   Hip Abd Iso  20x3" 20x3" 20x3" GTB 20x3"   Hip Add Iso   20x3" 20x3" 20x3" 20x3"   Sidestepping  3 laps @ bar    2 laps at // bars   Leg press 110# 2x10       Mini squats 2x10       Standing marches 2x10    10x B   Knee flexion stretch on Biodex step 10x10"                Standing hip abd  GTB 2x10        10x B    Ther Act             Step ups     6" 10x                               Modalities             ice  8' 8' 5' 5'

## 2023-12-11 ENCOUNTER — OFFICE VISIT (OUTPATIENT)
Dept: PHYSICAL THERAPY | Facility: CLINIC | Age: 58
End: 2023-12-11
Payer: COMMERCIAL

## 2023-12-11 DIAGNOSIS — M17.11 PRIMARY OSTEOARTHRITIS OF RIGHT KNEE: Primary | ICD-10-CM

## 2023-12-11 DIAGNOSIS — Z96.651 S/P TOTAL KNEE ARTHROPLASTY, RIGHT: ICD-10-CM

## 2023-12-11 PROCEDURE — 97140 MANUAL THERAPY 1/> REGIONS: CPT

## 2023-12-11 PROCEDURE — 97110 THERAPEUTIC EXERCISES: CPT

## 2023-12-11 PROCEDURE — 97112 NEUROMUSCULAR REEDUCATION: CPT

## 2023-12-11 NOTE — PROGRESS NOTES
Daily Note     Today's date: 2023  Patient name: Gardenia Hill  : 1965  MRN: [id number]  Referring provider: Som Gonzalez MD  Dx:   Encounter Diagnosis     ICD-10-CM    1. Primary osteoarthritis of right knee  M17.11       2. S/P total knee arthroplasty, right  Z96.651                      Subjective: Pt states his knee was sore following last visit, but felt better the next day. Pt also reports he was able to go hunting over the weekend w/out difficulty. Objective: See treatment diary below      Assessment: Tolerated treatment well. Patient exhibited good technique with therapeutic exercises and would benefit from continued PT. Min fatigue noted w/ standing 3 way hip, bilaterally. Good tolerance to manual techniques; mild discomfort w/ patellar ROM. Pt defers ice to home. Plan: Progress treatment as tolerated. Diagnosis: R TKA    Precautions: N/A   POC: -   Authorization: Sofia Zarate after 5th visit; 60 PCY   Access Code: [facility]    Visit Count 6 7 3   (auth needed) 4 5   Manuals    R Knee/LE Stretching  KK LM KA KA KA   Patellar Mobs KK LM              Neuro Re-Ed        Quad Set    20x3" 20x3" 20x3"   Glute Set    20x3" 20x3"    SLR Flex + abd 2x10 ea. Flex and abd 2x10 ea 10x 2x10 Flex and abd   Bridge   2x10  10x            Education         There Ex         Active warm up Bike 5 min Bike 8'  P!  Upright bike L1 5'   Heel Slide    10x5" w/ strap 15x5" 20x5"   LAQ 20x3" 2# 20x3" 10x 3"x20 20x3"   Hip Abd Iso   20x3" 20x3" GTB 20x3"   Hip Add Iso    20x3" 20x3" 20x3"   Sidestepping  3 laps @ bar 3 laps at bar, RTB   2 laps at // bars   Leg press 110# 2x10 110# 2x10      Mini squats 2x10 2x10      Standing marches 2x10    10x B   Knee flexion stretch on Biodex step 10x10" 5x10"               Standing hip abd  GTB 2x10  GTB  3 way hip 2x10 BL      10x B    Ther Act             Step ups     6" 10x                               Modalities ice  defers 8' 5' 5'

## 2023-12-14 ENCOUNTER — OFFICE VISIT (OUTPATIENT)
Dept: PHYSICAL THERAPY | Facility: CLINIC | Age: 58
End: 2023-12-14
Payer: COMMERCIAL

## 2023-12-14 DIAGNOSIS — Z96.651 S/P TOTAL KNEE ARTHROPLASTY, RIGHT: ICD-10-CM

## 2023-12-14 DIAGNOSIS — M17.11 PRIMARY OSTEOARTHRITIS OF RIGHT KNEE: Primary | ICD-10-CM

## 2023-12-14 PROCEDURE — 97140 MANUAL THERAPY 1/> REGIONS: CPT | Performed by: PHYSICAL THERAPIST

## 2023-12-14 PROCEDURE — 97110 THERAPEUTIC EXERCISES: CPT | Performed by: PHYSICAL THERAPIST

## 2023-12-14 PROCEDURE — 97112 NEUROMUSCULAR REEDUCATION: CPT | Performed by: PHYSICAL THERAPIST

## 2023-12-14 NOTE — PROGRESS NOTES
Daily Note     Today's date: 2023  Patient name: Freddy Brandt  : 1965  MRN: [id number]  Referring provider: Dereje Mueller MD  Dx:   Encounter Diagnosis     ICD-10-CM    1. Primary osteoarthritis of right knee  M17.11       2. S/P total knee arthroplasty, right  Z96.651           Start Time: 1700  Stop Time: 1740  Total time in clinic (min): 40 minutes    Subjective: Pt reports stiffness in the right knee after doing a lot of driving today. Objective: See treatment diary below      Assessment: Tolerated treatment well. Continued with current POC with good tolerance from the patient. No pain reported with exercises. Occasional cuing required for proper exercise technique. Good extension mobility noted in the right knee, however, continues to lack full functional flexion. Continue to progress as tolerated. Patient would benefit from continued PT      Plan: Continue per plan of care. Diagnosis: R TKA    Precautions: N/A   POC: -   Authorization: 16v (-)    Access Code: [facility]    Visit Count 6 7 8  5   Manuals    R Knee/LE Stretching  KK LM KCB  KA   Patellar Mobs KK LM KCB             Neuro Re-Ed        Quad Set      20x3"   Glute Set         SLR Flex + abd 2x10 ea.  Flex and abd 2x10 ea 2x10 flex/abd  Flex and abd   Bridge   2x10 2x10      Standing TKE    2x10 BTB             Education         There Ex         Active warm up Bike 5 min Bike 8' Bike 3 8'   Upright bike L1 5'   Heel Slide      20x5"   LAQ 20x3" 2# 20x3" 20x3" 2#  20x3"   Hip Abd Iso     GTB 20x3"   Hip Add Iso      20x3"   Sidestepping  3 laps @ bar 3 laps at bar, RTB 3 laps at bar RTB  2 laps at // bars   Leg press 110# 2x10 110# 2x10 2x10 110#      Mini squats 2x10 2x10 2x10      Standing marches 2x10    10x B   Knee flexion stretch on Biodex step 10x10" 5x10" 5x10"      Standing Hip 3-way    20x ea BL GTB     Standing hip abd GTB 2x10 GTB  3 way hip 2x10 BL   10x B Ther Act           Step ups     6" 10x                           Modalities           ice  defers   5'

## 2023-12-18 ENCOUNTER — OFFICE VISIT (OUTPATIENT)
Dept: PHYSICAL THERAPY | Facility: CLINIC | Age: 58
End: 2023-12-18
Payer: COMMERCIAL

## 2023-12-18 DIAGNOSIS — Z96.651 S/P TOTAL KNEE ARTHROPLASTY, RIGHT: ICD-10-CM

## 2023-12-18 DIAGNOSIS — M17.11 PRIMARY OSTEOARTHRITIS OF RIGHT KNEE: Primary | ICD-10-CM

## 2023-12-18 PROCEDURE — 97110 THERAPEUTIC EXERCISES: CPT

## 2023-12-18 PROCEDURE — 97140 MANUAL THERAPY 1/> REGIONS: CPT

## 2023-12-18 PROCEDURE — 97112 NEUROMUSCULAR REEDUCATION: CPT

## 2023-12-18 NOTE — PROGRESS NOTES
"Daily Note     Today's date: 2023  Patient name: Jonathan Fitzgerald  : 1965  MRN: [id number]  Referring provider: Ty Castillo MD  Dx:   Encounter Diagnosis     ICD-10-CM    1. Primary osteoarthritis of right knee  M17.11       2. S/P total knee arthroplasty, right  Z96.651                      Subjective: Pt reports no new complaints.       Objective: See treatment diary below      Assessment: Tolerated treatment well. Patient exhibited good technique with therapeutic exercises and would benefit from continued PT. Good tolerance to manual stretching. Challenged by addition of 1.5lbs w/ SL hip abduction.       Plan: Progress treatment as tolerated.       Diagnosis: R TKA    Precautions: N/A   POC: -   Authorization: 16v (-)    Access Code: [facility]    Visit Count 6 7 8 9 5   Manuals    R Knee/LE Stretching  KK LM KCB LM KA   Patellar Mobs KK LM KCB LM            Neuro Re-Ed        Quad Set      20x3\"   Glute Set         SLR Flex + abd 2x10 ea. Flex and abd 2x10 ea 2x10 flex/abd 2x10  flex/abd, 1.5# Flex and abd   Bridge   2x10 2x10  2x10    Standing TKE    2x10 BTB BTB 2x10            Education         There Ex         Active warm up Bike 5 min Bike 8' Bike 3 8'  Bike 8' lv 3 Upright bike L1 5'   Heel Slide      20x5\"   LAQ 20x3\" 2# 20x3\" 20x3\" 2# 3# 20x3\" 20x3\"   Hip Abd Iso     GTB 20x3\"   Hip Add Iso      20x3\"   Sidestepping  3 laps @ bar 3 laps at bar, RTB 3 laps at bar RTB 3 laps at bar RTB 2 laps at // bars   Leg press 110# 2x10 110# 2x10 2x10 110#  110# 3x10    Mini squats 2x10 2x10 2x10  2x10    Standing marches 2x10    10x B   Knee flexion stretch on Biodex step 10x10\" 5x10\" 5x10\"  5x10\"    Standing Hip 3-way    20x ea BL GTB GTB 20 ea BL    Standing hip abd GTB 2x10 GTB  3 way hip 2x10 BL   10x B                Ther Act           Step ups     6\" 10x                           Modalities           ice  defers   5'               "

## 2023-12-21 ENCOUNTER — OFFICE VISIT (OUTPATIENT)
Dept: PHYSICAL THERAPY | Facility: CLINIC | Age: 58
End: 2023-12-21
Payer: COMMERCIAL

## 2023-12-21 DIAGNOSIS — M17.11 PRIMARY OSTEOARTHRITIS OF RIGHT KNEE: Primary | ICD-10-CM

## 2023-12-21 DIAGNOSIS — Z96.651 S/P TOTAL KNEE ARTHROPLASTY, RIGHT: ICD-10-CM

## 2023-12-21 PROCEDURE — 97140 MANUAL THERAPY 1/> REGIONS: CPT

## 2023-12-21 PROCEDURE — 97110 THERAPEUTIC EXERCISES: CPT

## 2023-12-21 PROCEDURE — 97112 NEUROMUSCULAR REEDUCATION: CPT

## 2023-12-21 NOTE — PROGRESS NOTES
"Daily Note     Today's date: 2023  Patient name: Jonathan Fitzgerald  : 1965  MRN: [id number]  Referring provider: Ty Castillo MD  Dx:   Encounter Diagnosis     ICD-10-CM    1. Primary osteoarthritis of right knee  M17.11       2. S/P total knee arthroplasty, right  Z96.651                      Subjective: Pt states he is feeling well, noting he has no significant knee pain. Pt states MD said he could be \"done w/ PT 3 weeks after last MD visit\".      Objective: See treatment diary below      Assessment: Tolerated treatment well. Patient exhibited good technique with therapeutic exercises and would benefit from continued PT.  Good tolerance to addition of SLS on foam.  Mild difficulty w/ 6\" step downs.  No increased discomfort noted w/ PROM.      Plan: Progress treatment as tolerated.  RE nv.     Diagnosis: R TKA    Precautions: N/A   POC: -   Authorization: 16v (-)    Access Code: [facility]    Visit Count 6 7 8 9 10   Manuals    R Knee/LE Stretching  KK LM KCB LM LM   Patellar Mobs KK LM KCB LM LM           Neuro Re-Ed        Quad Set         Glute Set         SLR Flex + abd 2x10 ea. Flex and abd 2x10 ea 2x10 flex/abd 2x10  flex/abd, 1.5# Flex and abd, 2x10 1.5#   Bridge   2x10 2x10  2x10 2x10   Standing TKE    2x10 BTB BTB 2x10 nv   SLS     10\"x5 yellow foam           Education         There Ex         Active warm up Bike 5 min Bike 8' Bike 3 8'  Bike 8' lv 3 Bike 8' lev 3   Heel Slide         LAQ 20x3\" 2# 20x3\" 20x3\" 2# 3# 20x3\" 3# 30x3\"   Hip Abd Iso        Hip Add Iso         Sidestepping  3 laps @ bar 3 laps at bar, RTB 3 laps at bar RTB 3 laps at bar RTB 3 laps GTB   Leg press 110# 2x10 110# 2x10 2x10 110#  110# 3x10 110# 3x10   Mini squats 2x10 2x10 2x10  2x10    Standing marches 2x10       Knee flexion stretch on Biodex step 10x10\" 5x10\" 5x10\"  5x10\"    Standing Hip 3-way    20x ea BL GTB GTB 20 ea BL GTB 20 ea BL   Standing hip abd GTB 2x10 " "GTB  3 way hip 2x10 BL                   Ther Act           Step ups     6\" 10x up and over                           Modalities           ice  defers                                     "

## 2023-12-28 ENCOUNTER — EVALUATION (OUTPATIENT)
Dept: PHYSICAL THERAPY | Facility: CLINIC | Age: 58
End: 2023-12-28
Payer: COMMERCIAL

## 2023-12-28 DIAGNOSIS — M17.11 PRIMARY OSTEOARTHRITIS OF RIGHT KNEE: Primary | ICD-10-CM

## 2023-12-28 DIAGNOSIS — Z96.651 S/P TOTAL KNEE ARTHROPLASTY, RIGHT: ICD-10-CM

## 2023-12-28 PROCEDURE — 97112 NEUROMUSCULAR REEDUCATION: CPT | Performed by: PHYSICAL THERAPIST

## 2023-12-28 NOTE — PROGRESS NOTES
Progress Note & Discharge    Today's date: 2023  Patient name: Jonathan Fitzgerald  : 1965  MRN: [id number]  Referring provider: Ty Castillo MD  Dx:   Encounter Diagnosis     ICD-10-CM    1. Primary osteoarthritis of right knee  M17.11       2. S/P total knee arthroplasty, right  Z96.651           Start Time: 1730  Stop Time: 1755  Total time in clinic (min): 25 minutes    Subjective: The patient presents for re-evaluation today. The patient reports improvement in symptoms since beginning skilled physical therapy. The patient reports 0/10 pain at it's worst over the past 24 hours, and reports 100% improvement in overall condition since beginning formal PT care. The patient has no remaining concerns noted at this time. Pt feels comfortable containing to manage rehabilitation independently and will contact clinic if additional PT services are required.         Objective:     Active Range of Motion       Right Hip   Flexion: WFL  Abduction: WFL  External rotation (90/90): WFL  Internal rotation (90/90): 15 degrees     Left Knee   Flexion: 120 degrees   Extension: 3 degrees     Right Knee   Flexion: 115 degrees   Extension: 0 degrees     Strength/Myotome Testing     Left Hip   Planes of Motion   Flexion: 5  Extension: 5  Abduction: 5  Adduction: 5  External rotation: 5  Internal rotation: 5    Right Hip   Planes of Motion   Flexion: 5  Extension: 5  Abduction: 5  Adduction: 5  External rotation: 5  Internal rotation: 5    Left Knee   Flexion: 5  Extension: 5    Right Knee   Flexion: 5  Extension: 5    Ambulation     Ambulation: Stairs   Ascend stairs: independent  Pattern: reciprocal  Railings: one rail  Descend stairs: independent  Pattern: reciprocal  Railings: one rail      Assessment: Jonathan Fitzgerald is a pleasant 58 y.o. male who has been receiving PT intervention s/p right TKA performed 11/15/23. The patient has demonstrated decreased pain, increased strength, increased ROM, improved body  "mechanics, improved gait mechanics , and increased activity tolerance since beginning treatment.      Primary remaining impairments include:    1)  None         Goals  Patient will be independent with home exercise program.   Patient will be able to manage symptoms independently.     Short Term Goals 2-4 wks   1. Pt will be independent with initial HEP -MET  2. Pt will decrease pain in the right knee to < 0/10 at worst -MET  3. Pt will improve right knee ROM to 0-120* -Partially MET    Long Term Goals 6-8 wks  1. Pt will improve mm strength of the right LE to 5/5 t/o -MET  2. Pt will demonstrate WNL gait mechanics without compensation -MET  3. Pt will improve FOTO score to greater than expected by d/c -MET    Plan:   D/C PT and continue with HEP. Pt advised to contact clinic if additional PT services are required.      Diagnosis: R TKA 11/15   Precautions: N/A   POC: 11/16-12/28   Authorization: [employer] (11/22-12/31)    Access Code: [email or web address]    Visit Count 11  8 9 10   Manuals 12/28 12/14 12/18 12/21   R Knee/LE Stretching  KCB   KCB LM LM   Patellar Mobs   KCB LM LM           Neuro Re-Ed        Quad Set         Glute Set         SLR Flex, Abd  2x10 3#  2x10 flex/abd 2x10  flex/abd, 1.5# Flex and abd, 2x10 1.5#   Bridge    2x10  2x10 2x10   Standing TKE    2x10 BTB BTB 2x10 nv   SLS     10\"x5 yellow foam           Education  R/A, D/C       There Ex         Active warm up Bike L3 8'  Bike 3 8'  Bike 8' lv 3 Bike 8' lev 3   Heel Slide         LAQ 30x 3#  20x3\" 2# 3# 20x3\" 3# 30x3\"   Sidestepping   3 laps at bar RTB 3 laps at bar RTB 3 laps GTB   Leg press   2x10 110#  110# 3x10 110# 3x10   Mini squats   2x10  2x10    Knee flexion stretch on Biodex step .  5x10\"  5x10\"    Standing Hip 3-way    20x ea BL GTB GTB 20 ea BL GTB 20 ea BL              Ther Act         Step ups     6\" 10x up and over                         Modalities          ice                                         "

## 2023-12-28 NOTE — LETTER
2023    Ty Castillo MD  [address] [facility] [address]    Patient: Jonathan Fitzgerald   YOB: 1965   Date of Visit: 2023     Encounter Diagnosis     ICD-10-CM    1. Primary osteoarthritis of right knee  M17.11       2. S/P total knee arthroplasty, right  Z96.651           Dear Dr. Castillo:    Thank you for your recent referral of Jonathan Fitzgerald. Please review the attached evaluation summary from Jonathan's recent visit.     Please verify that you agree with the plan of care by signing the attached order.     If you have any questions or concerns, please do not hesitate to call.     I sincerely appreciate the opportunity to share in the care of one of your patients and hope to have another opportunity to work with you in the near future.       Sincerely,    Ravinder Hay, PT      Referring Provider:      I certify that I have read the below Plan of Care and certify the need for these services furnished under this plan of treatment while under my care.                    Ty Castillo MD  [address] [facility] [address]  Via Fax: [phone]          Progress Note & Discharge    Today's date: 2023  Patient name: Jonathan Fitzgerald  : 1965  MRN: [id number]  Referring provider: Ty Castillo MD  Dx:   Encounter Diagnosis     ICD-10-CM    1. Primary osteoarthritis of right knee  M17.11       2. S/P total knee arthroplasty, right  Z96.651           Start Time: 1730  Stop Time: 1755  Total time in clinic (min): 25 minutes    Subjective: The patient presents for re-evaluation today. The patient reports improvement in symptoms since beginning skilled physical therapy. The patient reports 0/10 pain at it's worst over the past 24 hours, and reports 100% improvement in overall condition since beginning formal PT care. The patient has no remaining concerns noted at this time. Pt feels comfortable containing to manage rehabilitation  independently and will contact clinic if additional PT services are required.         Objective:     Active Range of Motion       Right Hip   Flexion: WFL  Abduction: WFL  External rotation (90/90): WFL  Internal rotation (90/90): 15 degrees     Left Knee   Flexion: 120 degrees   Extension: 3 degrees     Right Knee   Flexion: 115 degrees   Extension: 0 degrees     Strength/Myotome Testing     Left Hip   Planes of Motion   Flexion: 5  Extension: 5  Abduction: 5  Adduction: 5  External rotation: 5  Internal rotation: 5    Right Hip   Planes of Motion   Flexion: 5  Extension: 5  Abduction: 5  Adduction: 5  External rotation: 5  Internal rotation: 5    Left Knee   Flexion: 5  Extension: 5    Right Knee   Flexion: 5  Extension: 5    Ambulation     Ambulation: Stairs   Ascend stairs: independent  Pattern: reciprocal  Railings: one rail  Descend stairs: independent  Pattern: reciprocal  Railings: one rail      Assessment: Jonathan Fitzgerald is a pleasant 58 y.o. male who has been receiving PT intervention s/p right TKA performed 11/15/23. The patient has demonstrated decreased pain, increased strength, increased ROM, improved body mechanics, improved gait mechanics , and increased activity tolerance since beginning treatment.      Primary remaining impairments include:    1)  None         Goals  Patient will be independent with home exercise program.   Patient will be able to manage symptoms independently.     Short Term Goals 2-4 wks   1. Pt will be independent with initial HEP -MET  2. Pt will decrease pain in the right knee to < 0/10 at worst -MET  3. Pt will improve right knee ROM to 0-120* -Partially MET    Long Term Goals 6-8 wks  1. Pt will improve mm strength of the right LE to 5/5 t/o -MET  2. Pt will demonstrate WNL gait mechanics without compensation -MET  3. Pt will improve FOTO score to greater than expected by d/c -MET    Plan:   D/C PT and continue with HEP. Pt advised to contact clinic if additional PT  "services are required.      Diagnosis: R TKA 11/15   Precautions: N/A   POC: 11/16-12/28   Authorization: [employer] (11/22-12/31)    Access Code: [email or web address]    Visit Count 11  8 9 10   Manuals 12/28 12/14 12/18 12/21   R Knee/LE Stretching  KCB   KCB LM LM   Patellar Mobs   KCB LM LM           Neuro Re-Ed        Quad Set         Glute Set         SLR Flex, Abd  2x10 3#  2x10 flex/abd 2x10  flex/abd, 1.5# Flex and abd, 2x10 1.5#   Bridge    2x10  2x10 2x10   Standing TKE    2x10 BTB BTB 2x10 nv   SLS     10\"x5 yellow foam           Education  R/A, D/C       There Ex         Active warm up Bike L3 8'  Bike 3 8'  Bike 8' lv 3 Bike 8' lev 3   Heel Slide         LAQ 30x 3#  20x3\" 2# 3# 20x3\" 3# 30x3\"   Sidestepping   3 laps at bar RTB 3 laps at bar RTB 3 laps GTB   Leg press   2x10 110#  110# 3x10 110# 3x10   Mini squats   2x10  2x10    Knee flexion stretch on Biodex step .  5x10\"  5x10\"    Standing Hip 3-way    20x ea BL GTB GTB 20 ea BL GTB 20 ea BL              Ther Act         Step ups     6\" 10x up and over                         Modalities          ice                                                         "

## 2024-06-06 ENCOUNTER — OFFICE VISIT (OUTPATIENT)
Dept: OBGYN CLINIC | Facility: OTHER | Age: 59
End: 2024-06-06
Payer: COMMERCIAL

## 2024-06-06 ENCOUNTER — APPOINTMENT (OUTPATIENT)
Dept: RADIOLOGY | Facility: OTHER | Age: 59
End: 2024-06-06
Payer: COMMERCIAL

## 2024-06-06 VITALS
HEART RATE: 71 BPM | WEIGHT: 219 LBS | SYSTOLIC BLOOD PRESSURE: 149 MMHG | BODY MASS INDEX: 29.66 KG/M2 | HEIGHT: 72 IN | DIASTOLIC BLOOD PRESSURE: 104 MMHG

## 2024-06-06 DIAGNOSIS — M24.812 INTERNAL DERANGEMENT OF LEFT SHOULDER: Primary | ICD-10-CM

## 2024-06-06 DIAGNOSIS — M25.512 ACUTE PAIN OF LEFT SHOULDER: ICD-10-CM

## 2024-06-06 PROCEDURE — 73030 X-RAY EXAM OF SHOULDER: CPT

## 2024-06-06 PROCEDURE — 99214 OFFICE O/P EST MOD 30 MIN: CPT | Performed by: ORTHOPAEDIC SURGERY

## 2024-06-06 RX ORDER — ASPIRIN 81 MG/1
TABLET ORAL WEEKLY
COMMUNITY
Start: 2024-05-28

## 2024-06-06 NOTE — PROGRESS NOTES
Assessment  Diagnoses and all orders for this visit:    Internal derangement of left shoulder      Discussion and Plan:    Patient has an examination and injury that is worrisome for a rotator cuff tear. He has also been performing his physical therapy directed home exercise program that he learned from his previous rotator cuff repair of his right shoulder without benefit.   We will order an MRI scan of the left shoulder to further evaluate for a rotator cuff tear.   He will follow up after his MRI for discussion of results and further treatment options based on these results.     Subjective:   Patient ID: Jonathan Fitzgerald is a 59 y.o. male presents with a chief complaint of left shoulder pain.   The pain began 4 month(s) ago and is associated with an acute injury.  Patient reports that he fell while shoveling snow in February onto his left shoulder. The patient describes the pain as aching and dull in intensity,  intermittent, occurring with increasing frequency in timing, and localizes the pain to the  left subacromial joint, deltoid.  The pain is worse with overhead work, overuse, and raising arm over head and relieved by rest, ice, avoiding the painful activities.  The pain is not associated with numbness and tingling.  The pain is not associated with constitutional symptoms. The patient is awoken at night by the pain.    The patient has had no prior treatment for his left shoulder.    Previous history of right rotator cuff repair with benefit        The following portions of the patient's history were reviewed and updated as appropriate: allergies, current medications, past family history, past medical history, past social history, past surgical history and problem list.    Objective:  BP (!) 149/104 (BP Location: Right arm, Patient Position: Sitting, Cuff Size: Large)   Pulse 71   Ht 6' (1.829 m) Comment: verbal  Wt 99.3 kg (219 lb)   BMI 29.70 kg/m²       Left Shoulder Exam     Range of Motion    Left shoulder forward flexion: AROM: 120. Full PROM with pain.     Muscle Strength   Abduction: 3/5   External rotation: 4/5     Tests   Drop arm: positive    Other   Erythema: absent  Sensation: normal  Pulse: present             Physical Exam  Constitutional:       General: He is not in acute distress.     Appearance: He is well-developed.   Eyes:      Conjunctiva/sclera: Conjunctivae normal.      Pupils: Pupils are equal, round, and reactive to light.   Cardiovascular:      Rate and Rhythm: Normal rate and regular rhythm.   Pulmonary:      Effort: Pulmonary effort is normal.      Breath sounds: Normal breath sounds.   Abdominal:      General: Bowel sounds are normal.      Palpations: Abdomen is soft.   Musculoskeletal:      Cervical back: Normal range of motion and neck supple.   Skin:     General: Skin is warm and dry.      Findings: No erythema or rash.   Neurological:      Mental Status: He is alert and oriented to person, place, and time.      Deep Tendon Reflexes: Reflexes are normal and symmetric.   Psychiatric:         Behavior: Behavior normal.       I have personally reviewed pertinent films in PACS and my interpretation is as follows.    X Ray Left Shoulder 6/6/24: No acute osseous abnormalities or degenerative changes.     Scribe Attestation      I,:  Emmett Winslow am acting as a scribe while in the presence of the attending physician.:       I,:  Bassem Finn MD personally performed the services described in this documentation    as scribed in my presence.:

## 2024-07-27 ENCOUNTER — HOSPITAL ENCOUNTER (OUTPATIENT)
Dept: MRI IMAGING | Facility: HOSPITAL | Age: 59
Discharge: HOME | End: 2024-07-27
Attending: ORTHOPAEDIC SURGERY
Payer: COMMERCIAL

## 2024-07-27 DIAGNOSIS — M24.812 INTERNAL DERANGEMENT OF LEFT SHOULDER: ICD-10-CM

## 2024-07-27 PROCEDURE — 73221 MRI JOINT UPR EXTREM W/O DYE: CPT

## 2024-07-31 ENCOUNTER — TELEPHONE (OUTPATIENT)
Dept: OBGYN CLINIC | Facility: HOSPITAL | Age: 59
End: 2024-07-31

## 2024-08-13 ENCOUNTER — TELEPHONE (OUTPATIENT)
Dept: OTHER | Facility: HOSPITAL | Age: 59
End: 2024-08-13

## 2024-08-13 PROBLEM — S46.812A TEAR OF LEFT INFRASPINATUS TENDON: Status: ACTIVE | Noted: 2024-08-13

## 2024-08-13 NOTE — QUICK NOTE
I just spoke with the patient over the phone regarding the MRI results of his left shoulder which do document we clinically suspect it was a full-thickness supraspinatus and infraspinatus tear with retraction but only minimal atrophy.  This is consistent with the injury he sustained in February of this year and is certainly indicated as any acute traumatic full-thickness to tendon rupture to be repaired as soon as we possibly can.  Unfortunately there was a delay in his ability to come see us from February 2 June and then again from June until the end of July while he worked out his new insurance but now that he is squared away with that I would take the opportunity to schedule him for this repair as soon as possible.  His schedule does not allow him to have it performed this week and my schedule did not allow that to be performed next week so I will schedule him for August 28 for left shoulder arthroscopic rotator cuff repair and we will keep our visit on August 26 in the office to obtain informed consent and to be fitted for the sling etc.  The patient understands difficulty of this to tendon retracted tear and had a very similar picture in 2016 in his right shoulder so he is well aware of the recovery etc. and does wish to proceed forward.

## 2024-08-14 ENCOUNTER — ANESTHESIA EVENT (OUTPATIENT)
Dept: PERIOP | Facility: AMBULARY SURGERY CENTER | Age: 59
End: 2024-08-14
Payer: COMMERCIAL

## 2024-08-22 NOTE — PRE-PROCEDURE INSTRUCTIONS
Pre-Surgery Instructions:   Medication Instructions    aspirin (ECOTRIN LOW STRENGTH) 81 mg EC tablet Hold from now till after procedure       Medication instructions for day surgery reviewed. Please use only a sip of water to take your instructed medications. Avoid all over the counter vitamins, supplements and NSAIDS for one week prior to surgery per anesthesia guidelines. Tylenol is ok to take as needed.     You will receive a call one business day prior to surgery with an arrival time and hospital directions. If your surgery is scheduled on a Monday, the hospital will be calling you on the Friday prior to your surgery. If you have not heard from anyone by 8pm, please call the hospital supervisor through the hospital  at [phone]. ([place] [phone] or [place] [phone]).    Do not eat or drink anything after midnight the night before your surgery, including candy, mints, lifesavers, or chewing gum. Do not drink alcohol 24hrs before your surgery. Try not to smoke at least 24hrs before your surgery.       Follow the pre surgery showering instructions as listed in the “My Surgical Experience Booklet” or otherwise provided by your surgeon's office. Do not use a blade to shave the surgical area 1 week before surgery. It is okay to use a clean electric clippers up to 24 hours before surgery. Do not apply any lotions, creams, including makeup, cologne, deodorant, or perfumes after showering on the day of your surgery. Do not use dry shampoo, hair spray, hair gel, or any type of hair products.     No contact lenses, eye make-up, or artificial eyelashes. Remove nail polish, including gel polish, and any artificial, gel, or acrylic nails if possible. Remove all jewelry including rings and body piercing jewelry.     Wear causal clothing that is easy to take on and off. Consider your type of surgery.    Keep any valuables, jewelry, piercings at home. Please bring any specially ordered equipment (sling,  braces) if indicated.    Arrange for a responsible person to drive you to and from the hospital on the day of your surgery. Please confirm the visitor policy for the day of your procedure when you receive your phone call with an arrival time.     Call the surgeon's office with any new illnesses, exposures, or additional questions prior to surgery.    Please reference your “My Surgical Experience Booklet” for additional information to prepare for your upcoming surgery.

## 2024-08-26 ENCOUNTER — TELEPHONE (OUTPATIENT)
Dept: GASTROENTEROLOGY | Facility: CLINIC | Age: 59
End: 2024-08-26

## 2024-08-26 ENCOUNTER — OFFICE VISIT (OUTPATIENT)
Dept: OBGYN CLINIC | Facility: OTHER | Age: 59
End: 2024-08-26
Payer: COMMERCIAL

## 2024-08-26 VITALS
BODY MASS INDEX: 29.55 KG/M2 | HEART RATE: 72 BPM | SYSTOLIC BLOOD PRESSURE: 135 MMHG | DIASTOLIC BLOOD PRESSURE: 88 MMHG | WEIGHT: 218.2 LBS | HEIGHT: 72 IN

## 2024-08-26 DIAGNOSIS — S46.812A TRAUMATIC RUPTURE OF SUPRASPINATUS TENDON OF LEFT SHOULDER, INITIAL ENCOUNTER: Primary | ICD-10-CM

## 2024-08-26 DIAGNOSIS — S46.812A TEAR OF LEFT INFRASPINATUS TENDON, INITIAL ENCOUNTER: ICD-10-CM

## 2024-08-26 PROCEDURE — 99214 OFFICE O/P EST MOD 30 MIN: CPT | Performed by: ORTHOPAEDIC SURGERY

## 2024-08-26 NOTE — PROGRESS NOTES
Assessment  Diagnoses and all orders for this visit:    Traumatic rupture of supraspinatus tendon of left shoulder, initial encounter    Tear of left infraspinatus tendon, initial encounter      Discussion and Plan:  As per my addendum to his previous visit the patient does have a large full-thickness complete supraspinatus and infraspinatus tear with some retraction but preserved muscle bulk, given the acute nature of his injury he is benefited by considering repair and as discussed this may be challenging given the amount of time that elapsed since the injury and his ability to have this fixed but we will get it done as soon as possible to improve his ability to recover.  We will have the subacromial balloon spacer available to augment the repair if necessary.    A thorough discussion was performed with the patient regarding the risks and benefit of operative and nonoperative treatment of their rotator cuff tear.  Risks discussed include but were not limited to infection, neurovascular injury, recurrent tear, nonhealing of the repair, need for further surgery, need for biceps tenodesis or tenotomy, stiffness, need for prolonged rehabilitation, as well as the risk of anesthesia.  After this discussion all questions were answered and informed consent was obtained in the office for arthroscopic rotator cuff repair of the left shoulder.  The patient will be scheduled for this procedure accordingly.     Subjective:   Patient ID: Jonathan Fitzgerald is a 59 y.o. male      HPI  Patient presents today to discuss the finding of his left shoulder MRI.  I did call the patient and discuss the results of the MRI to help expedite scheduling surgery. Patient reports that he fell while shoveling snow in February onto his left shoulder. The patient describes the pain as aching and dull in intensity,  intermittent, occurring with increasing frequency in timing, and localizes the pain to the  left subacromial joint, deltoid.  The  pain is worse with overhead work, overuse, and raising arm over head and relieved by rest, ice, avoiding the painful activities.  The pain is not associated with numbness and tingling.  The pain is not associated with constitutional symptoms. The patient is awoken at night by the pain.    The patient has had no prior treatment for his left shoulder.    The following portions of the patient's history were reviewed and updated as appropriate: allergies, current medications, past family history, past medical history, past social history, past surgical history and problem list.        Objective:  /88   Pulse 72   Ht 6' (1.829 m)   Wt 99 kg (218 lb 3.2 oz)   BMI 29.59 kg/m²     Left Shoulder Exam      Range of Motion   Left shoulder forward flexion: AROM: 120. Full PROM with pain.      Muscle Strength   Abduction: 3/5   External rotation: 4/5      Tests   Drop arm: positive     Other   Erythema: absent  Sensation: normal  Pulse: present       Physical Exam  Vitals and nursing note reviewed.   Constitutional:       Appearance: He is well-developed.   HENT:      Head: Normocephalic and atraumatic.   Eyes:      Pupils: Pupils are equal, round, and reactive to light.   Cardiovascular:      Rate and Rhythm: Normal rate and regular rhythm.      Pulses: Normal pulses.      Heart sounds: Normal heart sounds.   Pulmonary:      Effort: Pulmonary effort is normal. No respiratory distress.      Breath sounds: Normal breath sounds.   Abdominal:      General: Abdomen is flat. There is no distension.      Palpations: Abdomen is soft.   Musculoskeletal:      Cervical back: Normal range of motion and neck supple.   Skin:     General: Skin is warm and dry.   Neurological:      Mental Status: He is alert and oriented to person, place, and time.   Psychiatric:         Mood and Affect: Mood normal.         Behavior: Behavior normal.           I have personally reviewed pertinent films in PACS and my interpretation is as  follows.    MRI left shoulder demonstrates full thickness supraspinatus and infraspinatus with retraction to the glenoid articular surface.    Scribe Attestation      I,:  Anastasia Mishra MA am acting as a scribe while in the presence of the attending physician.:       I,:  Bassem Finn MD personally performed the services described in this documentation    as scribed in my presence.:

## 2024-08-26 NOTE — TELEPHONE ENCOUNTER
Called and informed pt about the guidelines changing. He was good with the 7 year recall. Updated recall. Also told him if he has questions or issues before he is due, don't hesitate to call.

## 2024-08-26 NOTE — PATIENT INSTRUCTIONS
You are being scheduled for a shoulder arthroscopy to treat your symptoms.  Below are some instructions and information on what to expect before and after your surgery.        Pre-Surgical Preparation for Arthroscopic Shoulder Surgery:     You will be contacted the evening prior to your surgery to confirm the scheduled time of the procedure and when to arrive at the hospital.   Do not eat or drink anything after midnight the night before your surgery.  Since you are having out-patient surgery, make sure that you have someone who can drive you home later in the day.  Also, prepare that person for a long day, as the process of safely preparing for and recovering from the procedure is more time consuming than the actual procedure!      As you will be in a sling after surgery, please wear or bring a loose fitting button-down shirt so that you can easily place this over the sling when you leave the surgical suite.  This avoids having to place the operative arm in a sleeve.  Most patients find that this is the easiest outfit to wear for the first week or so after surgery so you may want to plan accordingly.    Most patients find that lying down in bed after shoulder surgery accentuates their discomfort.  This is likely related to the effect of gravity on the swelling in the shoulder.  As a result, most patients sleep better in a recliner or in bed with pillows propped up behind their back for the first few days or weeks after surgery.  It is a good idea to plan for this ahead of time so there will be less hassle getting things set up the night after surgery.       What to Expect After Arthroscopic Shoulder Surgery:    It is normal to have swelling and discomfort in the shoulder for several days or a week after surgery.  It is also normal to have a small amount of drainage from the surgical wounds (especially the first few days after surgery), as we put fluid into the shoulder to visualize the structures during surgery.   "It is NOT normal to have foul smelling, purulent drainage and if this is noted, please contact the office immediately or proceed to the emergency room for evaluation as this may indicate an infection.     Applying ice bags to the shoulder may help with pain that is not controlled by the regional block.  Ice should be applied 20-30 minutes at a time, every hour or two.  Make sure to put a thin towel or T-shirt next to your skin to avoid direct contact of the ice with the skin. Icing is most helpful in the first 48 hours, although many people find that continuing past this time frame lessens their postoperative pain.  Please note that your post-operative dressing is not conductive to ice, so if you need to, it is okay to remove that dressing even the night after surgery and place band-aids over the wounds in order for the ice to take effect.     Pain Control    Most patients will receive a nerve block, the local anesthetic may keep your whole arm numb for up to 4 days.  You will be given a prescription for narcotic pain medication when you are discharged from the hospital.  With the newer nerve block that is being utilized, patients are rarely requiring the use of this narcotic pain medication.  If you find you do not tolerate that type of pain medicine well, call our office and we will try another one.     In addition to the narcotic pain medication, it is safe to use an anti-inflammatory (unless the patient has a medical condition that would not allow safe use of this mediation).  This includes the Advil, Motrin, Ibuprofen and Alleve category of medications.  Simply follow the over the counter dosing on the package and use as indicated as another adjunct.  Importantly since these medications are all very similar, use only one of them.  Tylenol is a separate medication that can be utilized as well and can be taken at the same time as the other medication or given in a \"staggered\" manner.  Just make sure that you " follow the dosing on the over the counter bottle instructions.  Also make sure that the pain medication prescribed by Dr Finn's team does not contain acetaminophen (this is found in Percocet and Vicodin).   Typically we do not prescribe those types of pain medications but if for some reason that has been prescribed DO NOT add more Tylenol (acetaminophen) as you could end up taking too much of that medication.    As mentioned above, most patients find that lying down accentuates their discomfort. You might sleep better in a recliner, or propped up in bed.     Dr. Finn encourages patients to safely ambulate around the house as much as possible in the first few days after the procedure as this can help with blood circulation in the legs. While the incidence of blood clots is very rare following shoulder surgery, early ambulation is a great way to help decrease the already low rate.    24 hours after the surgery you may remove the bandage and cover incisions with Band-Aids if needed. At that time you may shower, the wounds will have a surgical glue that will protect them from shower water but do not submerge your incisions directly (bathing or swimming) until at least 2 weeks post-operatively.  It is safe to let the arm hang at the side and take a shower and put on a shirt without the sling on.  Just make sure that you do not use the operative are to reach out and grab anything as that may damage the repair.  When you are done showering and getting dressed please return the operative arm to the sling.    Unless noted otherwise in your discharge paperwork, Dr. Finn uses absorbable sutures so they do not need to be removed.    Dr. Finn’s physician assistant (PA) will see you in the office a few days after the procedure to review the intra-operative findings and to initiate physical therapy if appropriate.  A post-operative appointment should have been scheduled for you already, but if for some reason this did  not happen, please call the office to make one.     Physical therapy is important after nearly all shoulder surgeries and a detailed rehabilitation plan based on the specific intra-operative findings and procedures will be provided to your therapist at the first post-operative office visit.  Most patients have post-operative therapy appointments scheduled pre-operatively, but if you do not, that will be handled at the first post-operative office visit.  Unless expressly directed otherwise it is safe to remove the sling even the first day after the surgery and let the arm hang by the side.  This allows patients to shower and even put a shirt on (bad arm in the sleeve first).  It is also safe to flex and extend their wrist, hand and fingers as much as possible when the block wears off.  These simple motions can serve to pump fluid out of the forearm and decrease swelling in the arm.

## 2024-08-26 NOTE — TELEPHONE ENCOUNTER
Patient is in the recalls for a colonoscopy due in November with for hx of polyps with tubular adenoma. Called and spoke with pt and he pointed out that he had a colonoscopy done last year for diverticulitis in May.  That colonoscopy report states to repeat in 10 years. He doesn't feel comfortable waiting that long since having the precancerous polyps. Wondering if he could go on a 5 year plan and be due in 2028. Please advise, thank you!

## 2024-08-28 ENCOUNTER — HOSPITAL ENCOUNTER (OUTPATIENT)
Facility: AMBULARY SURGERY CENTER | Age: 59
Setting detail: OUTPATIENT SURGERY
Discharge: HOME | End: 2024-08-28
Attending: ORTHOPAEDIC SURGERY | Admitting: ORTHOPAEDIC SURGERY
Payer: COMMERCIAL

## 2024-08-28 ENCOUNTER — ANESTHESIA (OUTPATIENT)
Dept: PERIOP | Facility: AMBULARY SURGERY CENTER | Age: 59
End: 2024-08-28
Payer: COMMERCIAL

## 2024-08-28 VITALS
BODY MASS INDEX: 30.07 KG/M2 | SYSTOLIC BLOOD PRESSURE: 134 MMHG | RESPIRATION RATE: 20 BRPM | WEIGHT: 222 LBS | TEMPERATURE: 97.2 F | HEART RATE: 82 BPM | DIASTOLIC BLOOD PRESSURE: 79 MMHG | OXYGEN SATURATION: 97 % | HEIGHT: 72 IN

## 2024-08-28 DIAGNOSIS — S46.812A TEAR OF LEFT INFRASPINATUS TENDON, INITIAL ENCOUNTER: ICD-10-CM

## 2024-08-28 DIAGNOSIS — S46.812A TRAUMATIC RUPTURE OF SUPRASPINATUS TENDON OF LEFT SHOULDER, INITIAL ENCOUNTER: Primary | ICD-10-CM

## 2024-08-28 PROCEDURE — 29827 SHO ARTHRS SRG RT8TR CUF RPR: CPT | Performed by: ORTHOPAEDIC SURGERY

## 2024-08-28 PROCEDURE — 29823 SHO ARTHRS SRG XTNSV DBRDMT: CPT | Performed by: PHYSICIAN ASSISTANT

## 2024-08-28 PROCEDURE — C9290 INJ, BUPIVACAINE LIPOSOME: HCPCS | Performed by: ANESTHESIOLOGY

## 2024-08-28 PROCEDURE — 29827 SHO ARTHRS SRG RT8TR CUF RPR: CPT | Performed by: PHYSICIAN ASSISTANT

## 2024-08-28 PROCEDURE — C1713 ANCHOR/SCREW BN/BN,TIS/BN: HCPCS | Performed by: ORTHOPAEDIC SURGERY

## 2024-08-28 PROCEDURE — 29823 SHO ARTHRS SRG XTNSV DBRDMT: CPT | Performed by: ORTHOPAEDIC SURGERY

## 2024-08-28 DEVICE — SP FIBERTAK RC, DBLOAD TAPE BL/W, BLK/W
Type: IMPLANTABLE DEVICE | Site: SHOULDER | Status: FUNCTIONAL
Brand: ARTHREX®

## 2024-08-28 RX ORDER — OXYCODONE HYDROCHLORIDE 5 MG/1
TABLET ORAL
Qty: 13 TABLET | Refills: 0 | Status: SHIPPED | OUTPATIENT
Start: 2024-08-28

## 2024-08-28 RX ORDER — SODIUM CHLORIDE, SODIUM LACTATE, POTASSIUM CHLORIDE, CALCIUM CHLORIDE 600; 310; 30; 20 MG/100ML; MG/100ML; MG/100ML; MG/100ML
INJECTION, SOLUTION INTRAVENOUS CONTINUOUS PRN
Status: DISCONTINUED | OUTPATIENT
Start: 2024-08-28 | End: 2024-08-28

## 2024-08-28 RX ORDER — BUPIVACAINE HYDROCHLORIDE 5 MG/ML
INJECTION, SOLUTION EPIDURAL; INTRACAUDAL
Status: COMPLETED | OUTPATIENT
Start: 2024-08-28 | End: 2024-08-28

## 2024-08-28 RX ORDER — CEFAZOLIN SODIUM 2 G/50ML
2000 SOLUTION INTRAVENOUS ONCE
Status: COMPLETED | OUTPATIENT
Start: 2024-08-28 | End: 2024-08-28

## 2024-08-28 RX ORDER — MIDAZOLAM HYDROCHLORIDE 2 MG/2ML
INJECTION, SOLUTION INTRAMUSCULAR; INTRAVENOUS AS NEEDED
Status: DISCONTINUED | OUTPATIENT
Start: 2024-08-28 | End: 2024-08-28

## 2024-08-28 RX ORDER — PROPOFOL 10 MG/ML
INJECTION, EMULSION INTRAVENOUS AS NEEDED
Status: DISCONTINUED | OUTPATIENT
Start: 2024-08-28 | End: 2024-08-28

## 2024-08-28 RX ORDER — OXYCODONE HYDROCHLORIDE 5 MG/1
5 TABLET ORAL EVERY 4 HOURS PRN
Status: DISCONTINUED | OUTPATIENT
Start: 2024-08-28 | End: 2024-08-28 | Stop reason: HOSPADM

## 2024-08-28 RX ORDER — FENTANYL CITRATE 50 UG/ML
INJECTION, SOLUTION INTRAMUSCULAR; INTRAVENOUS AS NEEDED
Status: DISCONTINUED | OUTPATIENT
Start: 2024-08-28 | End: 2024-08-28

## 2024-08-28 RX ORDER — HYDROMORPHONE HCL/PF 1 MG/ML
0.5 SYRINGE (ML) INJECTION
Status: DISCONTINUED | OUTPATIENT
Start: 2024-08-28 | End: 2024-08-28 | Stop reason: HOSPADM

## 2024-08-28 RX ORDER — LABETALOL HYDROCHLORIDE 5 MG/ML
5 INJECTION, SOLUTION INTRAVENOUS
Status: DISCONTINUED | OUTPATIENT
Start: 2024-08-28 | End: 2024-08-28 | Stop reason: HOSPADM

## 2024-08-28 RX ORDER — FENTANYL CITRATE/PF 50 MCG/ML
25 SYRINGE (ML) INJECTION
Status: DISCONTINUED | OUTPATIENT
Start: 2024-08-28 | End: 2024-08-28 | Stop reason: HOSPADM

## 2024-08-28 RX ORDER — ONDANSETRON 2 MG/ML
INJECTION INTRAMUSCULAR; INTRAVENOUS AS NEEDED
Status: DISCONTINUED | OUTPATIENT
Start: 2024-08-28 | End: 2024-08-28

## 2024-08-28 RX ORDER — SODIUM CHLORIDE, SODIUM LACTATE, POTASSIUM CHLORIDE, CALCIUM CHLORIDE 600; 310; 30; 20 MG/100ML; MG/100ML; MG/100ML; MG/100ML
125 INJECTION, SOLUTION INTRAVENOUS CONTINUOUS
Status: CANCELLED | OUTPATIENT
Start: 2024-08-28

## 2024-08-28 RX ORDER — ALBUTEROL SULFATE 0.83 MG/ML
2.5 SOLUTION RESPIRATORY (INHALATION) ONCE AS NEEDED
Status: DISCONTINUED | OUTPATIENT
Start: 2024-08-28 | End: 2024-08-28 | Stop reason: HOSPADM

## 2024-08-28 RX ORDER — MEPERIDINE HYDROCHLORIDE 25 MG/ML
12.5 INJECTION INTRAMUSCULAR; INTRAVENOUS; SUBCUTANEOUS ONCE
Status: DISCONTINUED | OUTPATIENT
Start: 2024-08-28 | End: 2024-08-28 | Stop reason: HOSPADM

## 2024-08-28 RX ORDER — PROMETHAZINE HYDROCHLORIDE 25 MG/ML
12.5 INJECTION, SOLUTION INTRAMUSCULAR; INTRAVENOUS ONCE AS NEEDED
Status: DISCONTINUED | OUTPATIENT
Start: 2024-08-28 | End: 2024-08-28 | Stop reason: HOSPADM

## 2024-08-28 RX ORDER — LIDOCAINE HYDROCHLORIDE 10 MG/ML
INJECTION, SOLUTION EPIDURAL; INFILTRATION; INTRACAUDAL; PERINEURAL AS NEEDED
Status: DISCONTINUED | OUTPATIENT
Start: 2024-08-28 | End: 2024-08-28

## 2024-08-28 RX ORDER — ACETAMINOPHEN 325 MG/1
650 TABLET ORAL EVERY 6 HOURS PRN
Status: CANCELLED | OUTPATIENT
Start: 2024-08-28

## 2024-08-28 RX ORDER — ONDANSETRON 2 MG/ML
4 INJECTION INTRAMUSCULAR; INTRAVENOUS EVERY 6 HOURS PRN
Status: CANCELLED | OUTPATIENT
Start: 2024-08-28

## 2024-08-28 RX ORDER — DEXAMETHASONE SODIUM PHOSPHATE 4 MG/ML
INJECTION, SOLUTION INTRA-ARTICULAR; INTRALESIONAL; INTRAMUSCULAR; INTRAVENOUS; SOFT TISSUE AS NEEDED
Status: DISCONTINUED | OUTPATIENT
Start: 2024-08-28 | End: 2024-08-28

## 2024-08-28 RX ORDER — ONDANSETRON 2 MG/ML
4 INJECTION INTRAMUSCULAR; INTRAVENOUS ONCE AS NEEDED
Status: COMPLETED | OUTPATIENT
Start: 2024-08-28 | End: 2024-08-28

## 2024-08-28 RX ADMIN — FENTANYL CITRATE 25 MCG: 50 INJECTION INTRAMUSCULAR; INTRAVENOUS at 10:11

## 2024-08-28 RX ADMIN — SODIUM CHLORIDE, SODIUM LACTATE, POTASSIUM CHLORIDE, AND CALCIUM CHLORIDE: .6; .31; .03; .02 INJECTION, SOLUTION INTRAVENOUS at 09:55

## 2024-08-28 RX ADMIN — CEFAZOLIN SODIUM 2000 MG: 2 SOLUTION INTRAVENOUS at 09:57

## 2024-08-28 RX ADMIN — ONDANSETRON 4 MG: 2 INJECTION INTRAMUSCULAR; INTRAVENOUS at 10:07

## 2024-08-28 RX ADMIN — SODIUM CHLORIDE, SODIUM LACTATE, POTASSIUM CHLORIDE, AND CALCIUM CHLORIDE: .6; .31; .03; .02 INJECTION, SOLUTION INTRAVENOUS at 10:46

## 2024-08-28 RX ADMIN — BUPIVACAINE HYDROCHLORIDE 5 ML: 5 INJECTION, SOLUTION EPIDURAL; INTRACAUDAL at 09:15

## 2024-08-28 RX ADMIN — PROPOFOL 200 MG: 10 INJECTION, EMULSION INTRAVENOUS at 10:02

## 2024-08-28 RX ADMIN — DEXAMETHASONE SODIUM PHOSPHATE 5 MG: 4 INJECTION INTRA-ARTICULAR; INTRALESIONAL; INTRAMUSCULAR; INTRAVENOUS; SOFT TISSUE at 10:07

## 2024-08-28 RX ADMIN — ONDANSETRON 4 MG: 2 INJECTION INTRAMUSCULAR; INTRAVENOUS at 11:37

## 2024-08-28 RX ADMIN — LIDOCAINE HYDROCHLORIDE 50 MG: 10 INJECTION, SOLUTION EPIDURAL; INFILTRATION; INTRACAUDAL at 10:02

## 2024-08-28 RX ADMIN — MIDAZOLAM 2 MG: 1 INJECTION INTRAMUSCULAR; INTRAVENOUS at 09:11

## 2024-08-28 RX ADMIN — BUPIVACAINE 20 ML: 13.3 INJECTION, SUSPENSION, LIPOSOMAL INFILTRATION at 09:15

## 2024-08-28 NOTE — ANESTHESIA PROCEDURE NOTES
Peripheral Block    Patient location during procedure: holding area  Start time: 8/28/2024 9:07 AM  Reason for block: at surgeon's request and post-op pain management  Staffing  Performed by: Prasanth Baker MD  Authorized by: Prasanth Baker MD    Preanesthetic Checklist  Completed: patient identified, IV checked, site marked, risks and benefits discussed, surgical consent, monitors and equipment checked, pre-op evaluation and timeout performed  Peripheral Block  Patient position: sitting  Prep: ChloraPrep  Patient monitoring: frequent blood pressure checks, continuous pulse oximetry and heart rate  Block type: Interscalene  Laterality: left  Injection technique: single-shot  Procedures: ultrasound guided, Ultrasound guidance required for the procedure to increase accuracy and safety of medication placement and decrease risk of complications.  Ultrasound permanent image saved  bupivacaine (PF) (MARCAINE) 0.5 % injection 20 mL - Perineural   5 mL - 8/28/2024 9:15:00 AM  bupivacaine liposomal (EXPAREL) 1.3 % injection 20 mL - Perineural   20 mL - 8/28/2024 9:15:00 AM  Needle  Needle type: Stimuplex   Needle gauge: 20 G  Needle length: 4 in  Needle localization: anatomical landmarks and ultrasound guidance  Needle insertion depth: 4 cm  Assessment  Injection assessment: incremental injection, frequent aspiration, injected with ease, negative aspiration, negative for heart rate change, no paresthesia on injection, no symptoms of intraneural/intravenous injection and needle tip visualized at all times  Paresthesia pain: none  Post-procedure:  site cleaned  patient tolerated the procedure well with no immediate complications

## 2024-08-28 NOTE — ANESTHESIA POSTPROCEDURE EVALUATION
Post-Op Assessment Note    CV Status:  Stable    Pain management: adequate       Mental Status:  Sleepy   Hydration Status:  Stable   PONV Controlled:  None   Airway Patency:  Patent     Post Op Vitals Reviewed: Yes    No anethesia notable event occurred.    Staff: CRNA               BP      Temp      Pulse     Resp      SpO2

## 2024-08-28 NOTE — INTERVAL H&P NOTE
H&P reviewed. After examining the patient I find no changes in the patients condition since the H&P had been written.    Vitals:    08/28/24 0834   BP: 146/94   Pulse: 72   Resp: 18   Temp: 98 °F (36.7 °C)   SpO2: 95%

## 2024-08-28 NOTE — ANESTHESIA PREPROCEDURE EVALUATION
Review of Systems/Medical History  Patient summary reviewed.  Chart reviewed.  No history of anesthetic complications     Cardiovascular   Exercise tolerance (METS): >4 METS. Hyperlipidemia    Pulmonary  Negative pulmonary ROS   Comment: Snores, no apneas     GI/Hepatic  Negative GI/hepatic ROS          Negative  ROS        Endo/Other  Negative endo/other ROS       GYN       Hematology  Negative hematology ROS ,     Musculoskeletal  Negative musculoskeletal ROS         Neurology  Negative neurology ROS ,     Psychology   Negative psychology ROS                 Physical Exam    Airway    Mallampati score: I  TM Distance: >3 FB  Neck ROM: full     Dental   No notable dental hx     Cardiovascular      Pulmonary      Other Findings       No recent labs    Anesthesia Plan  ASA Score- 1     Anesthesia Type- general and regional with ASA Monitors.         Additional Monitors:     Airway Plan: LMA.    Comment: Patient seen and examined.  History reviewed.  Patient to be done under general anesthesia with LMA and routine monitors.  IS block with Exparel to be performed preoperatively for post-op pain.  Risks discussed with the patient. Consent obtained.Patient seen and examined.  History reviewed.  Patient to be done under general anesthesia with LMA and routine monitors.  IS block with Exparel to be performed preoperatively for post-op pain.  Risks discussed with the patient. Consent obtained..       Plan Factors-Exercise tolerance (METS): >4 METS.    Chart reviewed.   Existing labs reviewed. Patient summary reviewed.                  Induction- intravenous.    Postoperative Plan-         Informed Consent- Anesthetic plan and risks discussed with patient and spouse.  I personally reviewed this patient with the CRNA. Discussed and agreed on the Anesthesia Plan with the CRNA..

## 2024-08-28 NOTE — OP NOTE
OPERATIVE REPORT  PATIENT NAME: Jonathan Fitzgerald    :  1965  MRN: [id number]  Pt Location: [facility] OR ROOM 06    SURGERY DATE: 2024     SURGEON: Bassem Finn MD     ASSISTANT: Jessica Brandon PA-C     NOTE: Jessica Brandon PA-C was present throughout the entire procedure and performed essential assistance with patient prepping, draping, positioning, suture management, wound closure, sterile dressing application and sling application, all under my direct supervision.     NOTE: No qualified resident physician was available for assistance    PREOPERATIVE DIAGNOSIS:  Left Shoulder Supraspinatus Tear, Infraspinatus Tear, and Long Head Biceps Tendon Tear    POSTOPERATIVE DIAGNOSIS: Same plus Glenohumeral Osteoarthritis    PROCEDURES: Surgical Arthroscopy Left Shoulder with Rotator Cuff Repair and Extensive Debridement    ANESTHESIA STAFF: Prasanth Baker MD     ANESTHESIA TYPE: General LMA with ultrasound guided interscalene block (Exparel). The interscalene block was provided by the anesthesia staff per my request for postoperative pain control and to decrease the use of postoperative narcotic medication for pain control.    COMPLICATIONS: None    FINDINGS: Supraspinatus Tear, Infraspinatus Tear, Long Head Biceps Tendon Tear, and Glenohumeral Osteoarthritis    SPECIMEN(S):  None    ESTIMATED BLOOD LOSS: Minimal    INDICATION:  Briefly, the patient is a 59 y.o.  male with left shoulder pain. MRI scan confirmed a full thickness supraspinatus and infraspinatus tear. The patient elected for arthroscopic treatment. Informed consent was obtained after a thorough discussion of the risks and benefits of the procedure, as well as alternatives to the procedure.     OPERATIVE TECHNIQUE:  On the day of surgery, I identified the patient’s left shoulder and marked it with my initials. The patient was taken to the operating room where anesthesia was induced and 2 grams of IV Cefazolin were given. The patient was  examined in the supine position and was found to have full range of motion of the left shoulder with no instability. The patient was then positioned in the semilateral [facility] chair position. All bony prominences were padded. The shoulder was prepped and draped in normal sterile fashion. After a time-out for safety, a standard posterolateral arthroscopic portal was made.  Upon insertion of the arthroscope a large glenohumeral hemarthrosis was returned through the scope.  This was an unexpected finding.  Glenohumeral evaluation revealed another unexpected finding of diffuse grade 3 and 4 articular cartilage loss over the humeral head and glenoid not anticipated based on preoperative imaging.   The hemarthrosis had various degrees of clotted hematoma and a significant mount of time was taken to remove the hemarthrosis and hematoma throughout the glenohumeral joint and subacromial space which communicated due to the large retracted supraspinatus and infraspinatus tear.  The long head biceps was confirmed to be torn and no longer in the glenohumeral joint and the circumferential and labrum did have degenerative changes consistent with the glenohumeral osteoarthritis seen.  The subscapularis was intact.  An anterior cannula was established and an extensive debridement of the glenohumeral joint was performed using a shaving device including the long head biceps tendon stump, the circumferential and labrum, the articular cartilage changes of the glenoid and the humeral head as well as debridement of the exposed subchondral bone of both the glenoid and humeral head to a stable border.   After the intra-articular work was completed, the scope was then placed in the subacromial space through the same portal where a thorough bursectomy was performed. The full thickness supraspinatus and infraspinatus tear was found to measure 4.0 cm from anterior to posterior and was able to be reduced to the medial aspect of the  tuberosity even after utilizing electrocautery for releases superiorly inferiorly.  This did confirm my clinical suspicion preoperatively of some chronicity to the tear but by medializing our repair I did feel it was appropriate to attempt a full repair.  The tuberosity was prepared in routine fashion and a medialize repair with two medial 2.6 mm double loaded Arthrex All-Suture anchor using eight stiches through the tendon in horizontal mattress fashion was performed achieving reduction of the rotator cuff tendons to the medial tuberosity.  Given the exposed lateral tuberosity a microfracture was performed using an awl through the anchor insertion portal to help provide lateral healing tissue.  The CA ligament did have fraying so was debrided to a stable border but was not released given the large size of the tear.  The area was then irrigated. Scope was withdrawn. Wounds were closed with 4-0 Monocryl and Histoacryl. Sterile dressings and a sling with an abduction pillow was placed. The patient was awoken without complication and returned to the recovery room in good condition. We will see the patient back in the office next week to initiate therapy following standard rotator cuff repair rehabilitation protocol. At the end of procedure, the counts were correct.     PATIENT DISPOSITION:  Stable to PACU      SIGNATURE: Bassem Finn MD  DATE: August 28, 2024  TIME: 11:09 AM

## 2024-09-04 ENCOUNTER — OFFICE VISIT (OUTPATIENT)
Dept: PHYSICAL THERAPY | Facility: CLINIC | Age: 59
End: 2024-09-04
Payer: COMMERCIAL

## 2024-09-04 DIAGNOSIS — S46.812D TRAUMATIC RUPTURE OF SUPRASPINATUS TENDON OF LEFT SHOULDER, SUBSEQUENT ENCOUNTER: ICD-10-CM

## 2024-09-04 DIAGNOSIS — S46.812D TEAR OF LEFT INFRASPINATUS TENDON, SUBSEQUENT ENCOUNTER: ICD-10-CM

## 2024-09-04 PROCEDURE — 97140 MANUAL THERAPY 1/> REGIONS: CPT | Performed by: PHYSICAL THERAPIST

## 2024-09-04 PROCEDURE — 97161 PT EVAL LOW COMPLEX 20 MIN: CPT | Performed by: PHYSICAL THERAPIST

## 2024-09-04 NOTE — PROGRESS NOTES
PT Evaluation     Today's date: 2024  Patient name: Jonathan Fitzgerald  : 1965  MRN: [id number]  Referring provider: Bassem Finn*  Dx:   Encounter Diagnosis     ICD-10-CM    1. Traumatic rupture of supraspinatus tendon of left shoulder, subsequent encounter  S46.812D Ambulatory Referral to Physical Therapy      2. Tear of left infraspinatus tendon, subsequent encounter  S46.812D Ambulatory Referral to Physical Therapy                     Assessment  Impairments: abnormal or restricted ROM, abnormal movement, activity intolerance and pain with function    Assessment details: Jonathan Fitzgerald is a 59 y.o. male who presents with signs and symptoms consistent of s/p L RTC repair performed by Dr. Finn on 24. Patient presents with decreased strength, decreased ROM, and decreased joint mobility as expected. Due to these impairments, patient has difficulty performing a/iadls, recreational activities, work-related activities, and engaging in social activities. Patient would benefit from skilled physical therapy to address the impairments, improve their level of function, and to improve their overall quality of life.    Understanding of Dx/Px/POC: good     Prognosis: good  Prognosis details: Positive prognostic indicators include: absence of observed red flags, positive attitude towards recovery, good understanding of diagnosis and treatment plan   Negative prognostic indicators include:     Goals  Short Term Goals: to be achieved by 4 weeks  1) Patient to be independent with basic HEP.  2) Decrease pain to 2/10 at its worst.  3) Increase shoulder ROM by 5-10 degrees in all planes  4) Increase shoulder strength by 1/2 MMT grade in all deficient planes.    Long Term Goals: to be achieved by discharge  1) FOTO equal to or greater than expected.  2) Patient to be independent with comprehensive HEP.  3) Patient will demonstrate maximal over head reaching  4) Increase UE strength to 5/5 MMT grade  in all planes to improve a/iadls.  5) Patient to report no sleep interruption secondary to pain.    Plan  Patient would benefit from: skilled physical therapy    Planned therapy interventions: manual therapy, neuromuscular re-education, patient education, therapeutic exercise, therapeutic activities and home exercise program    Frequency: 2x week  Duration in weeks: 8  Plan of Care beginning date: 2024  Plan of Care expiration date: 10/30/2024  Treatment plan discussed with: patient        Subjective Evaluation    History of Present Illness  Mechanism of injury: History: Pt presents s/p L RTC repair (supraspinatus and infraspinatus) performed by Dr. Finn on 24. His pain is well managed and he hasn't had to take any medicine. He has removed his bandages and showered and started bleeding. He cleaned it and put a band aid on and hasn't had any issue.      Aggravating: none   Alleviating: none   Sleeping: no difficulty  Social: lives with wife   Hobbies/occupation: office work       Patient Goals  Patient goals for therapy: return to sport/leisure activities    Pain  Current pain ratin  At best pain ratin  At worst pain ratin        Objective     Observations     Additional Observation Details  Incision healing well. No signs of infection.     Active Range of Motion     Right Shoulder   Flexion: 165 degrees   Abduction: 155 degrees   External rotation 90°: 90 degrees  Internal rotation 90°: 70 degrees     Additional Active Range of Motion Details  Not tested d/t protocol     Cervical AROM WFL   Elbow ROM WFL   Wrist ROM WFL     Passive Range of Motion   Left Shoulder   Flexion: 90 degrees   Abduction: Left shoulder passive abduction: 20.   External rotation 0°: 10 degrees   Internal rotation 0°: Left shoulder passive internal rotation at 0 degrees: body.     Right Shoulder   Normal passive range of motion    Strength/Myotome Testing     Right Shoulder     Planes of Motion   Flexion: 5    Abduction: 5   External rotation at 0°: 5   Internal rotation at 0°: 5     Additional Strength Details  L NT due to protocol             Precautions: Forward Flexion to 90°, External Rotation to 35° (Elbow at side), Internal Rotation to Body, Abduction to 60°  [employer]: [id number]   Re-Eval: 9/4-10/30   Authorization: $259 flat rate then $58 flat when deductible is hit   1:1 with PT:    Visit Count 1 2 3 4 5   Date:  9/4       Manuals        PROM within protocol range  MIRYAM                       Neuro Re-Ed        Scap squeeze HEP                                                       There Ex         Elbow PROM HEP       Supination/pronation PROM  HEP       Pendulum                                                       Ther Act                                                                                 Education

## 2024-09-05 ENCOUNTER — OFFICE VISIT (OUTPATIENT)
Dept: OBGYN CLINIC | Facility: OTHER | Age: 59
End: 2024-09-05

## 2024-09-05 VITALS
HEIGHT: 72 IN | HEART RATE: 82 BPM | BODY MASS INDEX: 30.07 KG/M2 | DIASTOLIC BLOOD PRESSURE: 70 MMHG | SYSTOLIC BLOOD PRESSURE: 132 MMHG | WEIGHT: 222 LBS

## 2024-09-05 DIAGNOSIS — Z98.890 STATUS POST RIGHT ROTATOR CUFF REPAIR: Primary | ICD-10-CM

## 2024-09-05 PROCEDURE — 99024 POSTOP FOLLOW-UP VISIT: CPT | Performed by: PHYSICIAN ASSISTANT

## 2024-09-05 NOTE — PROGRESS NOTES
Orthopaedic Surgery - Office Note  Jonathan Fitzgerald (59 y.o. male)   : 1965   MRN: [id number]  Encounter Date: 2024    Chief Complaint   Patient presents with    Right Shoulder - Post-op         Assessment/Plan  Diagnoses and all orders for this visit:    Status post right rotator cuff repair    Patient will follow all postoperative instructions.  Patient will wear the sling for 6 weeks, removing the pillow portion at 2 weeks postoperative.  Patient will attend physical therapy as scheduled following the standard rotator cuff protocol which was provided to the patient in the office today in the after visit summary.  Intraoperative pictures were reviewed with the patient in the office today.  Wound care instructions were again reviewed with the patient in the office today.  All question and concerns were reviewed with the patient in the office today.       Return for Recheck in 8 weeks with myself.        History of Present Illness  Patient is here today for postop visit after left shoulder arthroscopic rotator cuff repair on 2024.  Patient has been standard rotator cuff protocol.  He reports his pain is very well-controlled.  He reports he is familiar with the protocol after having a contralateral side performed in 2019.    Review of Systems  Pertinent items are noted in HPI.  All other systems were reviewed and are negative.    Physical Exam  /70 (BP Location: Right arm, Patient Position: Sitting, Cuff Size: Standard)   Pulse 82   Ht 6' (1.829 m)   Wt 101 kg (222 lb)   BMI 30.11 kg/m²   Cons: Appears well.  No apparent distress.  Psych: Alert. Oriented x3.  Mood and affect normal.    Left shoulder incisions are healing well there are no signs of infection.  He has sensation in the deltoid.  He has full range of motion of all digits and active wrist extension.  Plan is fitting appropriately.          Studies Reviewed  Operative report was reviewed by myself in the office  today    Procedures  No procedures today.    Medical, Surgical, Family, and Social History  The patient's medical history, family history, and social history, were reviewed and updated as appropriate.    Past Medical History:   Diagnosis Date    Arthritis     Colon polyp     Diverticulitis of colon     Fracture     right leg    Hyperlipidemia     diet controlled    Rotator cuff tear     bilateral    Sleep apnea     positive test- did not follow up    Snoring        Past Surgical History:   Procedure Laterality Date    COLONOSCOPY      HIP ARTHROPLASTY Right     JOINT REPLACEMENT      total r hip / right knee    KNEE ARTHROSCOPY Bilateral     l x1 r x2    MI COLONOSCOPY FLX DX W/COLLJ SPEC WHEN PFRMD N/A 07/26/2016    Procedure: COLONOSCOPY;  Surgeon: Luke Mejias MD;  [facility]: [facility];  Service: Gastroenterology    MI NDSC WRST SURG W/RLS TRANSVRS CARPL LIGM Right 12/30/2016    Procedure: ENDOSCOPIC CARPAL TUNNEL RELEASE ;  Surgeon: Ravin Albert MD;  Location[facility];  Service: Orthopedics    MI RPR UMBILICAL HRNA 5 YRS/> REDUCIBLE N/A 04/04/2022    Procedure: UMBILICAL HERNIA REPAIR;  Surgeon: Jc Rodriguez DO;  [facility];  Service: General    MI SURGICAL ARTHROSCOPY SHOULDER W/ROTATOR CUFF RPR Right 11/02/2018    Procedure: SHOULDER ARTHROSCOPIC ROTATOR CUFF REPAIR;  Surgeon: Bassem Finn MD;  [facility];  Service: Orthopedics    MI SURGICAL ARTHROSCOPY SHOULDER W/ROTATOR CUFF RPR Left 8/28/2024    Procedure: SHOULDER ARTHROSCOPIC ROTATOR CUFF REPAIR, EXTENSIVE DEBRIDEMENT;  Surgeon: Bassem Finn MD;  [facility];  Service: Orthopedics    ROTATOR CUFF REPAIR      WISDOM TOOTH EXTRACTION         Family History   Problem Relation Age of Onset    No Known Problems Family        Social History     Occupational History    Not on file   Tobacco Use    Smoking status: Former     Current packs/day: 0.00     Types: Cigarettes     Quit date: 1990     Years  since quittin.7    Smokeless tobacco: Never   Vaping Use    Vaping status: Never Used   Substance and Sexual Activity    Alcohol use: Yes     Alcohol/week: 4.0 standard drinks of alcohol     Types: 4 Cans of beer per week     Comment: socially    Drug use: No    Sexual activity: Not on file       No Known Allergies      Current Outpatient Medications:     aspirin (ECOTRIN LOW STRENGTH) 81 mg EC tablet, Take by mouth Once a week, Disp: , Rfl:     oxyCODONE (ROXICODONE) 5 immediate release tablet, 1 tablets every 6 hours as needed for severe pain only., Disp: 13 tablet, Rfl: 0      Ravin Steiner PA-C

## 2024-09-05 NOTE — PATIENT INSTRUCTIONS
Rotator Cuff Repair Rehabilitation Protocol  (adapted from Alysa MORENO et al. “Rehabilitation of the Rotator Cuff: An Evaluation Based Approach”. JAAOS 2006; 14:599-609)  Updated 10.14  Bassem Finn MD  [facility]  [address] [facility]  [place] [id number]  [phone]  Phase 1 (Weeks 0-6)   Immediate Postoperative Period   Goals:    Diminish Pain and Inflammation  Maintain and Protect Integrity of the Repair    Gradually Increase Passive ROM (NO Active or Active Assist until Week 6)    Become Independent with Modified ADLs   Precautions:  Maintain Arm in Abduction Sling, Remove Only for Directed Exercises (may remove Abduction Pillow after Day 21 for comfort)    Keep Incisions Clean & Dry (okay to shower in 48 hours, band-aids over incisions)  No Immersion (pool) until Wounds Totally Sealed (usually not prior to day #10)  Passive Shoulder Motion ONLY, No Lifting/Holding Objects, Reaching Behind Back  Okay to Type/Write at Desktop with Arm in Sling   Day 0-6    Elbow, Wrist, Hand AROM Exercises     Start Cervical AROM and Scapula Isometrics    Cryotherapy/Ice for Pain and Inflammation    Instruct in Hygiene, Posture, and Positioning    Day 7-28    Continue Above  May Start Pendulum Exercises    May Start Supine, Pain Free, PT assisted PROM  Forward Flexion to 90°, External Rotation to 35° (Elbow at side), Internal Rotation to Body, Abduction to 60°    Can Introduce light Cardio (Walking, Stationary Bike)    Aqua Therapy may begin at week 3 (day 21) as long as no wound problems   Day 29-42    Continue Above  Progress PROM to Goal of full PROM by Week 6.  May add Gentle Mobilizations (GH and Scapulothoracic) to Regain full PROM if Needed.  May add Heat prior to PROM Exercises, Ice after Exercises  May Begin AAROM at Day 29 if ROM is Appropriate in Anticipation of AROM Starting at Week 6   Criteria to Progress to Phase 2    Reasonable Passive Forward Flexion, Abduction ,  IR/ER    Time  Phase 2 (Weeks 6-12)   Protection and Active Motion   Goals of Phase:    Allow Healing of Soft Tissues    Decrease Pain and Inflammation  Add ADLs and Regain AROM by End of Phase   Precautions:    NO STRENGTHENING until Phase 3    Repair is Most Prone to Failure during this Phase!    No Lifting Objects > 2 lbs (Coffee Cup OK), no Sudden Motions    Avoid Upper Extremity Bike and Ergometer   Day 43-56    Discontinue Sling  Initiate AROM Exercises (forward flexion, ER, IR and abduction), Rotator Cuff Isometrics    Continue Periscapular Exercises, add Stretching if PROM Lacking   No Strengthening until Week 12 (Minimum Time Needed for Cuff Healing Sufficient to withstand Strengthening)     Phase 3 (Weeks 12-16)   Early Strengthening   Goal of Phase:    Gain full AROM, Maintain PROM    Gradual return of Shoulder Strength, Power and Endurance    Gradual return to Functional Activities   Precautions:    No Lifting > 10lbs, Sudden Lifting or Pushing activities, Overhead Lifting    No Upper Extremity Bike or Ergometer   Week 12    Initiate Strengthening Program (10 lb Maximum until Phase 4)      ER/IR with Bands (Standing)      ER in Lateral Decubitius Position      Lateral Raises      Full Can in Scapular Plane      Prone Rowing, Horizontal Abduction, Extension      Elbow Flexion/Extension   Week 14-16    Initiate light Functional Activities as Permitted  Progress to Fundamental Shoulder Exercises  Phase 4 (Variable but Weeks 16-24)   Aggressive Rehab  Sport Specific or Activity Specific    Goals of Phase:    Maintain Full Pain-free AROM    Advanced Conditioning Exercises for enhanced Functional use    Continue regaining Shoulder Strength, Power and Endurance    Eventual return to full Functional Activities   Precautions:    None   Week 16    Continue ROM and stretching if appropriate    Progress Strengthening, Proprioceptive and Neuromuscular Training    Light Sports if Progressing Well (Chipping/Putting,  easy ground strokes etc.)   Week 20    Continue Strengthening and Stretching    Initiate Interval Sports Program as Appropriate    Note:   This is a general program which may be modified based on intra-operative findings, additional procedures preformed, repair stability, and patient biological factors.  If in doubt, please check with my office for individual patient specifics.  The ultimate goal of the surgery and rehabilitation is to get the rotator cuff to heal with the least residual functional deficit due to stiffness.  That being said, I would rather have a stiff shoulder with a healed rotator cuff repair, than a loose shoulder with a failed repair!

## 2024-09-17 ENCOUNTER — OFFICE VISIT (OUTPATIENT)
Dept: PHYSICAL THERAPY | Facility: CLINIC | Age: 59
End: 2024-09-17
Payer: COMMERCIAL

## 2024-09-17 DIAGNOSIS — S46.812D TRAUMATIC RUPTURE OF SUPRASPINATUS TENDON OF LEFT SHOULDER, SUBSEQUENT ENCOUNTER: ICD-10-CM

## 2024-09-17 DIAGNOSIS — S46.812D TEAR OF LEFT INFRASPINATUS TENDON, SUBSEQUENT ENCOUNTER: Primary | ICD-10-CM

## 2024-09-17 PROCEDURE — 97110 THERAPEUTIC EXERCISES: CPT | Performed by: PHYSICAL THERAPIST

## 2024-09-17 PROCEDURE — 97140 MANUAL THERAPY 1/> REGIONS: CPT | Performed by: PHYSICAL THERAPIST

## 2024-09-17 NOTE — PROGRESS NOTES
"Daily Note     Today's date: 2024  Patient name: Jonathan Fitzgerald  : 1965  MRN: [id number]  Referring provider: Bassem Finn*  Dx:   Encounter Diagnosis     ICD-10-CM    1. Tear of left infraspinatus tendon, subsequent encounter  S46.812D       2. Traumatic rupture of supraspinatus tendon of left shoulder, subsequent encounter  S46.812D                      Subjective: Pt presents today without his sling and reports that he hasn't been wearing it       Objective: See treatment diary below      Assessment: PROM performed within protocol limits. Pt able to achieve 90 deg flexion, abduction 50 deg, IR to body, and ER to ~10 deg. Continue to work on stretching ER. Educated patient on following protocol at home.       Plan: Continue per plan of care.      Precautions: Forward Flexion to 90°, External Rotation to 35° (Elbow at side), Internal Rotation to Body, Abduction to 60°  Medbridge: [email or web address]   Re-Eval: -10/30   Authorization: $259 flat rate then $58 flat when deductible is hit   1:1 with PT: [phone]   Visit Count 1 2 3 4 5   Date:        Manuals        PROM within protocol range  MIRYAM MIRYAM                       Neuro Re-Ed        Scap squeeze HEP 30x                                                      There Ex         Elbow PROM HEP 30x      Supination/pronation PROM  HEP 30x      Pendulum  60\"                                                      Ther Act             Towel squeeze  30x                                                                  Education                                            "

## 2024-09-19 ENCOUNTER — OFFICE VISIT (OUTPATIENT)
Dept: PHYSICAL THERAPY | Facility: CLINIC | Age: 59
End: 2024-09-19
Payer: COMMERCIAL

## 2024-09-19 DIAGNOSIS — S46.812D TEAR OF LEFT INFRASPINATUS TENDON, SUBSEQUENT ENCOUNTER: Primary | ICD-10-CM

## 2024-09-19 DIAGNOSIS — S46.812D TRAUMATIC RUPTURE OF SUPRASPINATUS TENDON OF LEFT SHOULDER, SUBSEQUENT ENCOUNTER: ICD-10-CM

## 2024-09-19 PROCEDURE — 97110 THERAPEUTIC EXERCISES: CPT | Performed by: PHYSICAL THERAPIST

## 2024-09-19 PROCEDURE — 97140 MANUAL THERAPY 1/> REGIONS: CPT | Performed by: PHYSICAL THERAPIST

## 2024-09-19 PROCEDURE — 97112 NEUROMUSCULAR REEDUCATION: CPT | Performed by: PHYSICAL THERAPIST

## 2024-09-19 NOTE — PROGRESS NOTES
"Daily Note     Today's date: 2024  Patient name: Jonathan Fitzgerald  : 1965  MRN: [id number]  Referring provider: Bassem Finn*  Dx:   Encounter Diagnosis     ICD-10-CM    1. Tear of left infraspinatus tendon, subsequent encounter  S46.812D       2. Traumatic rupture of supraspinatus tendon of left shoulder, subsequent encounter  S46.812D                      Subjective: Pt reports being sore after last session for an hour and then it resolved.       Objective: See treatment diary below      Assessment: Tolerated treatment well today. Continued PROM per protocol. Minimal to no soreness at the end of the session.       Plan: Continue per plan of care.      Precautions: Forward Flexion to 90°, External Rotation to 35° (Elbow at side), Internal Rotation to Body, Abduction to 60°  Medbridge: [email or web address]   Re-Eval: -10/30   Authorization: $259 flat rate then $58 flat when deductible is hit   1:1 with PT: [phone]   Visit Count 1 2 3 4 5   Date:       Manuals        PROM within protocol range  MIRYAM MIRYAM  MIRYAM                     Neuro Re-Ed        Scap squeeze HEP 30x 30x                                                      There Ex         Elbow PROM HEP 30x 30x     Supination/pronation PROM  HEP 30x 30x     Pendulum  60\"  30x                                                    Ther Act             Towel squeeze  30x                                                                  Education                                              "

## 2024-09-21 DIAGNOSIS — Z00.6 ENCOUNTER FOR EXAMINATION FOR NORMAL COMPARISON OR CONTROL IN CLINICAL RESEARCH PROGRAM: ICD-10-CM

## 2024-09-23 ENCOUNTER — APPOINTMENT (OUTPATIENT)
Dept: LAB | Facility: CLINIC | Age: 59
End: 2024-09-23

## 2024-09-23 DIAGNOSIS — Z00.6 ENCOUNTER FOR EXAMINATION FOR NORMAL COMPARISON OR CONTROL IN CLINICAL RESEARCH PROGRAM: ICD-10-CM

## 2024-09-23 PROCEDURE — 36415 COLL VENOUS BLD VENIPUNCTURE: CPT

## 2024-09-24 ENCOUNTER — OFFICE VISIT (OUTPATIENT)
Dept: PHYSICAL THERAPY | Facility: CLINIC | Age: 59
End: 2024-09-24
Payer: COMMERCIAL

## 2024-09-24 DIAGNOSIS — S46.812D TEAR OF LEFT INFRASPINATUS TENDON, SUBSEQUENT ENCOUNTER: Primary | ICD-10-CM

## 2024-09-24 DIAGNOSIS — S46.812D TRAUMATIC RUPTURE OF SUPRASPINATUS TENDON OF LEFT SHOULDER, SUBSEQUENT ENCOUNTER: ICD-10-CM

## 2024-09-24 PROCEDURE — 97140 MANUAL THERAPY 1/> REGIONS: CPT | Performed by: PHYSICAL THERAPIST

## 2024-09-24 PROCEDURE — 97110 THERAPEUTIC EXERCISES: CPT | Performed by: PHYSICAL THERAPIST

## 2024-09-24 NOTE — PROGRESS NOTES
"Daily Note     Today's date: 2024  Patient name: Jonathan Fitzgerald  : 1965  MRN: [id number]  Referring provider: Bassem Finn*  Dx:   Encounter Diagnosis     ICD-10-CM    1. Tear of left infraspinatus tendon, subsequent encounter  S46.812D       2. Traumatic rupture of supraspinatus tendon of left shoulder, subsequent encounter  S46.812D                      Subjective: Pt reports things continue to improve       Objective: See treatment diary below      Assessment: Continued treatment plan per protocol. Patient able to progress to second phase next visit.       Plan: Continue per plan of care.      Precautions: Forward Flexion to 90°, External Rotation to 35° (Elbow at side), Internal Rotation to Body, Abduction to 60°  Medbridge: [email or web address]   Re-Eval: -10/30   Authorization: $259 flat rate then $58 flat when deductible is hit   1:1 with PT: [phone]   Visit Count 1 2 3 4 5   Date:      Manuals        PROM within protocol range  MIRYAM MIRYAM WITT MIRYAM                    Neuro Re-Ed        Scap squeeze HEP 30x 30x  30x                                                    There Ex         Elbow PROM HEP 30x 30x 30x    Supination/pronation PROM  HEP 30x 30x 30x    Pendulum  60\"  30x 30x                                                   Ther Act             Towel squeeze  30x  30x                                                                Education                                                "

## 2024-09-26 ENCOUNTER — OFFICE VISIT (OUTPATIENT)
Dept: PHYSICAL THERAPY | Facility: CLINIC | Age: 59
End: 2024-09-26
Payer: COMMERCIAL

## 2024-09-26 DIAGNOSIS — S46.812D TRAUMATIC RUPTURE OF SUPRASPINATUS TENDON OF LEFT SHOULDER, SUBSEQUENT ENCOUNTER: ICD-10-CM

## 2024-09-26 DIAGNOSIS — S46.812D TEAR OF LEFT INFRASPINATUS TENDON, SUBSEQUENT ENCOUNTER: Primary | ICD-10-CM

## 2024-09-26 PROCEDURE — 97110 THERAPEUTIC EXERCISES: CPT | Performed by: PHYSICAL THERAPIST

## 2024-09-26 PROCEDURE — 97140 MANUAL THERAPY 1/> REGIONS: CPT | Performed by: PHYSICAL THERAPIST

## 2024-09-26 NOTE — PROGRESS NOTES
"Daily Note     Today's date: 2024  Patient name: Jonathan Fitzgerald  : 1965  MRN: [id number]  Referring provider: Bassem Finn*  Dx:   Encounter Diagnosis     ICD-10-CM    1. Tear of left infraspinatus tendon, subsequent encounter  S46.812D       2. Traumatic rupture of supraspinatus tendon of left shoulder, subsequent encounter  S46.812D                      Subjective: Pt felt great after last session with no soreness       Objective: See treatment diary below      Assessment: Incorporated AAROM table slides and wall crawls per protocol. Continues to do well with PROM with increasing range flexion and abduction today.       Plan: Continue per plan of care.      Precautions: No PROM restrictions. -10/9: full PROM and begin AAROM  10/10 can begin AROM     Medbridge: [email or web address]   Re-Eval: -10/30   Authorization: $259 flat rate then $58 flat when deductible is hit   1:1 with PT: [phone]   Visit Count 1 2 3 4 5   Date:     Manuals        PROM within protocol range  MIRYAM MIRYAM  MIRYAM MIRYAM MIRYAM                   Neuro Re-Ed        Scap squeeze HEP 30x 30x  30x 30x                                                   There Ex         Elbow PROM HEP 30x 30x 30x    Supination/pronation PROM  HEP 30x 30x 30x    Pendulum  60\"  30x 30x 60\"   Table slides flexion and scaption     10x5\"    Wall crawls flexion     10x5\"                                   Ther Act             Towel squeeze  30x  30x                                                                Education                                                  " Destruction After The Procedure: No

## 2024-10-01 ENCOUNTER — OFFICE VISIT (OUTPATIENT)
Dept: PHYSICAL THERAPY | Facility: CLINIC | Age: 59
End: 2024-10-01
Payer: COMMERCIAL

## 2024-10-01 DIAGNOSIS — S46.812D TRAUMATIC RUPTURE OF SUPRASPINATUS TENDON OF LEFT SHOULDER, SUBSEQUENT ENCOUNTER: Primary | ICD-10-CM

## 2024-10-01 DIAGNOSIS — S46.812D TEAR OF LEFT INFRASPINATUS TENDON, SUBSEQUENT ENCOUNTER: ICD-10-CM

## 2024-10-01 PROCEDURE — 97110 THERAPEUTIC EXERCISES: CPT | Performed by: PHYSICAL THERAPIST

## 2024-10-01 PROCEDURE — 97112 NEUROMUSCULAR REEDUCATION: CPT | Performed by: PHYSICAL THERAPIST

## 2024-10-01 PROCEDURE — 97140 MANUAL THERAPY 1/> REGIONS: CPT | Performed by: PHYSICAL THERAPIST

## 2024-10-01 NOTE — PROGRESS NOTES
"Daily Note     Today's date: 10/1/2024  Patient name: Jonathan Fitzgerald  : 1965  MRN: [id number]  Referring provider: Bassem Finn*  Dx:   Encounter Diagnosis     ICD-10-CM    1. Traumatic rupture of supraspinatus tendon of left shoulder, subsequent encounter  S46.812D       2. Tear of left infraspinatus tendon, subsequent encounter  S46.812D                      Subjective: Pt with no complaints today       Objective: See treatment diary below      Assessment: Tolerated treatment well. Continued to work on PROM per protocol with 4/10 stretch or less. Pt with a little soreness at the end of the session. Advised to ice if needed.       Plan: Continue per plan of care.      Precautions: No PROM restrictions. -10/9: full PROM and begin AAROM  10/10 can begin AROM     Medbridge: [email or web address]   Re-Eval: -10/30   Authorization: $259 flat rate then $58 flat when deductible is hit   1:1 with PT: [phone]   Visit Count 6  3 4 5   Date:  10/1  9/19 9/24 9/26   Manuals        PROM within protocol range  MIRYAM  MIYRAM MIRYAM MIRYAM                   Neuro Re-Ed        Scap squeeze 30x  30x  30x 30x                                                   There Ex         Elbow PROM 30x  30x 30x    Supination/pronation PROM  30x  30x 30x    Pendulum 60\"  30x 30x 60\"   Table slides flexion and scaption 10x5\"     10x5\"    Wall crawls flexion 10x5\"     10x5\"                                 Ther Act           Towel squeeze    30x                                                              Education                                                    "

## 2024-10-03 ENCOUNTER — OFFICE VISIT (OUTPATIENT)
Dept: PHYSICAL THERAPY | Facility: CLINIC | Age: 59
End: 2024-10-03
Payer: COMMERCIAL

## 2024-10-03 DIAGNOSIS — S46.812D TRAUMATIC RUPTURE OF SUPRASPINATUS TENDON OF LEFT SHOULDER, SUBSEQUENT ENCOUNTER: Primary | ICD-10-CM

## 2024-10-03 PROCEDURE — 97110 THERAPEUTIC EXERCISES: CPT | Performed by: PHYSICAL THERAPIST

## 2024-10-03 PROCEDURE — 97140 MANUAL THERAPY 1/> REGIONS: CPT | Performed by: PHYSICAL THERAPIST

## 2024-10-03 PROCEDURE — 97112 NEUROMUSCULAR REEDUCATION: CPT | Performed by: PHYSICAL THERAPIST

## 2024-10-03 NOTE — PROGRESS NOTES
"Daily Note     Today's date: 10/3/2024  Patient name: Jonathan Fitzgerald  : 1965  MRN: [id number]  Referring provider: Bassem Finn*  Dx:   Encounter Diagnosis     ICD-10-CM    1. Traumatic rupture of supraspinatus tendon of left shoulder, subsequent encounter  S46.812D                      Subjective: Pt reports that his shoulder continues to feel pretty good       Objective: See treatment diary below      Assessment: Tolerated treatment well today. Continued to work on ROM within protocol limits and incorporate active assisted into treatment.       Plan: Continue per plan of care.      Precautions: No PROM restrictions. -10/9: full PROM and begin AAROM  10/10 can begin AROM     Medbridge: [email or web address]   Re-Eval: -10/30   Authorization: $259 flat rate then $58 flat when deductible is hit   1:1 with PT: [phone]   Visit Count 6 7 3 4 5   Date:  10/1 10/3 9/19 9/24 9/26   Manuals        PROM within protocol range  MIRYAM MIRYAM MIRYAM MIRYAM MIRYAM                   Neuro Re-Ed        Scap squeeze 30x 30x 30x  30x 30x                                                   There Ex         Elbow PROM 30x  30x 30x    Supination/pronation PROM  30x  30x 30x    Pendulum 60\" 60\" 30x 30x 60\"   Table slides flexion and scaption 10x5\"  10x5\"    10x5\"    Wall crawls flexion 10x5\"  10x5\"    10x5\"                                 Ther Act           Towel squeeze  30x  30x                                                              Education                                                      "

## 2024-10-04 LAB
APOB+LDLR+PCSK9 GENE MUT ANL BLD/T: NOT DETECTED
BRCA1+BRCA2 DEL+DUP + FULL MUT ANL BLD/T: NOT DETECTED
MLH1+MSH2+MSH6+PMS2 GN DEL+DUP+FUL M: NOT DETECTED

## 2024-10-08 ENCOUNTER — OFFICE VISIT (OUTPATIENT)
Dept: PHYSICAL THERAPY | Facility: CLINIC | Age: 59
End: 2024-10-08
Payer: COMMERCIAL

## 2024-10-08 DIAGNOSIS — S46.812D TEAR OF LEFT INFRASPINATUS TENDON, SUBSEQUENT ENCOUNTER: ICD-10-CM

## 2024-10-08 DIAGNOSIS — S46.812D TRAUMATIC RUPTURE OF SUPRASPINATUS TENDON OF LEFT SHOULDER, SUBSEQUENT ENCOUNTER: Primary | ICD-10-CM

## 2024-10-08 PROCEDURE — 97140 MANUAL THERAPY 1/> REGIONS: CPT | Performed by: PHYSICAL THERAPIST

## 2024-10-08 PROCEDURE — 97110 THERAPEUTIC EXERCISES: CPT | Performed by: PHYSICAL THERAPIST

## 2024-10-08 NOTE — PROGRESS NOTES
"Daily Note     Today's date: 10/8/2024  Patient name: Jonathan Fitzgerald  : 1965  MRN: [id number]  Referring provider: Bassem Finn*  Dx:   Encounter Diagnosis     ICD-10-CM    1. Traumatic rupture of supraspinatus tendon of left shoulder, subsequent encounter  S46.812D       2. Tear of left infraspinatus tendon, subsequent encounter  S46.812D                      Subjective: Patient reports no complaints at the shoulder       Objective: See treatment diary below      Assessment: Patient tolerated treatment well. Added in additional AAROM exercises to work on progressing range of motion. Patient tends to have less guarding with AAROM vs PROM and is able to achieve a moderate stretch without pain. Educated patient on importance of healing and when the tear is most vulnerable.       Plan: Continue per plan of care.      Precautions: No PROM restrictions. -10/9: full PROM and begin AAROM  10/10 can begin AROM     Medbridge: [email or web address]   Re-Eval: -10/30   Authorization: $259 flat rate then $58 flat when deductible is hit   1:1 with PT: [phone]   Visit Count 6 7 8  5   Date:  10/1 10/3 10/8  9/26   Manuals        PROM within protocol range  MIRYAM MIRYAM MIRYAM  MIRYAM                   Neuro Re-Ed        Scap squeeze 30x 30x 30x  30x                                                   There Ex         Elbow PROM 30x       Supination/pronation PROM  30x       Pendulum 60\" 60\"   60\"   Table slides flexion and scaption 10x5\"  10x5\"  10x5\"   10x5\"    Wall crawls flexion 10x5\"  10x5\"  10x5\"  10x5\"    AAROM cane flexion, ER   10x5\"                        Ther Act         Towel squeeze  30x                                                            Education                                                    "

## 2024-10-10 ENCOUNTER — OFFICE VISIT (OUTPATIENT)
Dept: PHYSICAL THERAPY | Facility: CLINIC | Age: 59
End: 2024-10-10
Payer: COMMERCIAL

## 2024-10-10 DIAGNOSIS — S46.812D TRAUMATIC RUPTURE OF SUPRASPINATUS TENDON OF LEFT SHOULDER, SUBSEQUENT ENCOUNTER: Primary | ICD-10-CM

## 2024-10-10 DIAGNOSIS — S46.812D TEAR OF LEFT INFRASPINATUS TENDON, SUBSEQUENT ENCOUNTER: ICD-10-CM

## 2024-10-10 PROCEDURE — 97110 THERAPEUTIC EXERCISES: CPT | Performed by: PHYSICAL THERAPIST

## 2024-10-10 PROCEDURE — 97112 NEUROMUSCULAR REEDUCATION: CPT | Performed by: PHYSICAL THERAPIST

## 2024-10-10 PROCEDURE — 97140 MANUAL THERAPY 1/> REGIONS: CPT | Performed by: PHYSICAL THERAPIST

## 2024-10-10 NOTE — PROGRESS NOTES
"Daily Note     Today's date: 10/10/2024  Patient name: Jonathan Fitzgerald  : 1965  MRN: [id number]  Referring provider: Bassem Finn*  Dx:   Encounter Diagnosis     ICD-10-CM    1. Traumatic rupture of supraspinatus tendon of left shoulder, subsequent encounter  S46.812D       2. Tear of left infraspinatus tendon, subsequent encounter  S46.812D                      Subjective: Pt reports no complaints today      Objective: See treatment diary below      Assessment: Tolerated treatment well today. Began AROM exercises to 90 deg and utilized visual feedback to decrease compensatory strategies. Able to complete to 80 deg without issue. Also began bent over row/scap retraction which required significant cueing for form.       Plan: Continue per plan of care.      Precautions: No PROM restrictions. -10/9: full PROM and begin AAROM  10/10 can begin AROM     Medbridge: [email or web address]   Re-Eval: -10/30   Authorization: $259 flat rate then $58 flat when deductible is hit   1:1 with PT: [phone]   Visit Count 6 7 8 9 5   Date:  10/1 10/3 10/8 10/10 9/26   Manuals        PROM within protocol range  MIRYAM MIRYAM MIRYAM MIRYAM MIRYAM                   Neuro Re-Ed        Scap squeeze 30x 30x 30x 30x 30x   AROM- flexion to 90 deg    2x10 mirror    Bent over row/scap retraction    2x10                                     There Ex         Elbow PROM 30x       Supination/pronation PROM  30x       Pendulum 60\" 60\"   60\"   Table slides flexion and scaption 10x5\"  10x5\"  10x5\"  10x5\"  10x5\"    Wall crawls flexion 10x5\"  10x5\"  10x5\" 10x5\"  10x5\"    AAROM cane flexion, ER   10x5\" 10x5\"                        Ther Act         Towel squeeze  30x                                                            Education                                                      "

## 2024-10-15 ENCOUNTER — APPOINTMENT (OUTPATIENT)
Dept: PHYSICAL THERAPY | Facility: CLINIC | Age: 59
End: 2024-10-15
Payer: COMMERCIAL

## 2024-10-17 ENCOUNTER — OFFICE VISIT (OUTPATIENT)
Dept: PHYSICAL THERAPY | Facility: CLINIC | Age: 59
End: 2024-10-17
Payer: COMMERCIAL

## 2024-10-17 DIAGNOSIS — S46.812D TEAR OF LEFT INFRASPINATUS TENDON, SUBSEQUENT ENCOUNTER: ICD-10-CM

## 2024-10-17 DIAGNOSIS — S46.812D TRAUMATIC RUPTURE OF SUPRASPINATUS TENDON OF LEFT SHOULDER, SUBSEQUENT ENCOUNTER: Primary | ICD-10-CM

## 2024-10-17 PROCEDURE — 97112 NEUROMUSCULAR REEDUCATION: CPT | Performed by: PHYSICAL THERAPIST

## 2024-10-17 PROCEDURE — 97110 THERAPEUTIC EXERCISES: CPT | Performed by: PHYSICAL THERAPIST

## 2024-10-17 PROCEDURE — 97530 THERAPEUTIC ACTIVITIES: CPT | Performed by: PHYSICAL THERAPIST

## 2024-10-17 PROCEDURE — 97140 MANUAL THERAPY 1/> REGIONS: CPT | Performed by: PHYSICAL THERAPIST

## 2024-10-17 NOTE — PROGRESS NOTES
"Daily Note     Today's date: 10/17/2024  Patient name: Jonathan Fitzgerald  : 1965  MRN: [id number]  Referring provider: Bassem Finn*  Dx:   Encounter Diagnosis     ICD-10-CM    1. Traumatic rupture of supraspinatus tendon of left shoulder, subsequent encounter  S46.812D       2. Tear of left infraspinatus tendon, subsequent encounter  S46.812D                      Subjective: Patient reports that he had to move a tractor tire on Monday and tweaked his shoulder. It was sore for the rest of the day but has gradually gotten better.       Objective: See treatment diary below      Assessment: Patient with no significant change in status today. ROM was similar to previous sessions and no increase in pain. Continue to educate patient on protocol and stage of healing. Progress as tolerated.      Plan: Continue per plan of care.      Precautions: No PROM restrictions. -10/9: full PROM and begin AAROM  10/10 can begin AROM     Medbridge: [email or web address]   Re-Eval: -10/30   Authorization: $259 flat rate then $58 flat when deductible is hit   1:1 with PT: [phone]   Visit Count 6 7 8 9 10   Date:  10/1 10/3 10/8 10/10 10/17   Manuals        PROM within protocol range  MIRYAM MIRYAM MIRYAM MIRYAM MIRYAM                   Neuro Re-Ed        Scap squeeze 30x 30x 30x 30x 30x   AROM- flexion to 90 deg    2x10 mirror 2x10   AROM- scaption to 90 deg     2x10   Bent over row/scap retraction    2x10  2x10   RTC Isometrics      10x3\" ea   Serratus punch supine     2x10                    There Ex         Elbow PROM 30x       Supination/pronation PROM  30x       Pendulum 60\" 60\"      Table slides flexion and scaption 10x5\"  10x5\"  10x5\"  10x5\"  10x5\"    Wall crawls flexion 10x5\"  10x5\"  10x5\" 10x5\"  10x5\"    AAROM cane flexion, ER   10x5\" 10x5\"                       Ther Act        Towel squeeze  30x                                                           Education                                                        "

## 2024-10-22 ENCOUNTER — OFFICE VISIT (OUTPATIENT)
Dept: PHYSICAL THERAPY | Facility: CLINIC | Age: 59
End: 2024-10-22
Payer: COMMERCIAL

## 2024-10-22 DIAGNOSIS — S46.812D TEAR OF LEFT INFRASPINATUS TENDON, SUBSEQUENT ENCOUNTER: ICD-10-CM

## 2024-10-22 DIAGNOSIS — S46.812D TRAUMATIC RUPTURE OF SUPRASPINATUS TENDON OF LEFT SHOULDER, SUBSEQUENT ENCOUNTER: Primary | ICD-10-CM

## 2024-10-22 PROCEDURE — 97140 MANUAL THERAPY 1/> REGIONS: CPT | Performed by: PHYSICAL THERAPIST

## 2024-10-22 PROCEDURE — 97112 NEUROMUSCULAR REEDUCATION: CPT | Performed by: PHYSICAL THERAPIST

## 2024-10-22 PROCEDURE — 97530 THERAPEUTIC ACTIVITIES: CPT | Performed by: PHYSICAL THERAPIST

## 2024-10-22 PROCEDURE — 97110 THERAPEUTIC EXERCISES: CPT | Performed by: PHYSICAL THERAPIST

## 2024-10-22 NOTE — PROGRESS NOTES
"Daily Note     Today's date: 10/22/2024  Patient name: Jonathan Fitzgerald  : 1965  MRN: [id number]  Referring provider: Bassem Finn*  Dx:   Encounter Diagnosis     ICD-10-CM    1. Traumatic rupture of supraspinatus tendon of left shoulder, subsequent encounter  S46.812D       2. Tear of left infraspinatus tendon, subsequent encounter  S46.812D                      Subjective: Pt reports feeling normal amounts of sore after last session       Objective: See treatment diary below      Assessment: Continued per protocol. Pt with most fatigue with standing shoulder flexion and scaption as expected.       Plan: Continue per plan of care.      Precautions: No PROM restrictions. -10/9: full PROM and begin AAROM  10/10 can begin AROM     [employer]: [email or web address]   Re-Eval: -10/30   Authorization: $259 flat rate then $58 flat when deductible is hit   1:1 with PT: [phone]   Visit Count 11  8 9 10   Date:  10/22  10/8 10/10 10/17   Manuals        PROM within protocol range  MIRYAM  MIRYAM MIRYAM MIRYAM                   Neuro Re-Ed        Scap squeeze   30x 30x 30x   AROM- flexion to 90 deg 2x10   2x10 mirror 2x10   AROM- scaption to 90 deg 2x10    2x10   Bent over row/scap retraction 2x10   2x10  2x10   RTC Isometrics 10x3\" ea     10x3\" ea   Serratus punch supine 2x10    2x10                    There Ex         Elbow PROM        Supination/pronation PROM         Pendulum        Table slides flexion and scaption 10x5\"   10x5\"  10x5\"  10x5\"    Wall crawls flexion 10x5\"   10x5\" 10x5\"  10x5\"    AAROM cane flexion, ER 10x5\"   10x5\" 10x5\"                       Ther Act        Towel squeeze                                                           Education                                                        "

## 2024-10-31 ENCOUNTER — OFFICE VISIT (OUTPATIENT)
Dept: PHYSICAL THERAPY | Facility: CLINIC | Age: 59
End: 2024-10-31
Payer: COMMERCIAL

## 2024-10-31 DIAGNOSIS — S46.812D TRAUMATIC RUPTURE OF SUPRASPINATUS TENDON OF LEFT SHOULDER, SUBSEQUENT ENCOUNTER: Primary | ICD-10-CM

## 2024-10-31 DIAGNOSIS — S46.812D TEAR OF LEFT INFRASPINATUS TENDON, SUBSEQUENT ENCOUNTER: ICD-10-CM

## 2024-10-31 PROCEDURE — 97110 THERAPEUTIC EXERCISES: CPT | Performed by: PHYSICAL THERAPIST

## 2024-10-31 PROCEDURE — 97112 NEUROMUSCULAR REEDUCATION: CPT | Performed by: PHYSICAL THERAPIST

## 2024-10-31 PROCEDURE — 97140 MANUAL THERAPY 1/> REGIONS: CPT | Performed by: PHYSICAL THERAPIST

## 2024-10-31 NOTE — PROGRESS NOTES
"Daily Note     Today's date: 10/31/2024  Patient name: Jonathan Fitzgerald  : 1965  MRN: [id number]  Referring provider: Bassem Finn*  Dx:   Encounter Diagnosis     ICD-10-CM    1. Traumatic rupture of supraspinatus tendon of left shoulder, subsequent encounter  S46.812D       2. Tear of left infraspinatus tendon, subsequent encounter  S46.812D                      Subjective: Pt reports that he's been feeling pretty good       Objective: See treatment diary below      Assessment: Tolerated treatment well today. Progressed towards scaption and abduction AROM which patient found significantly fatiguing but not painful. Continued to remind pt about his precautions and < 2 lbs recommendation. Also educated patient on practicing scaption/abduction in the mirror to identify compensatory strategies.       Plan: Continue per plan of care.      Precautions: No PROM restrictions. -10/9: full PROM and begin AAROM  10/10 can begin AROM     [employer]: [email or web address]   Re-Eval: -10/30   Authorization: $259 flat rate then $58 flat when deductible is hit   1:1 with PT: [phone]   Visit Count 11 12 8 9 10   Date:  10/22 10/31 10/8 10/10 10/17   Manuals        PROM within protocol range  MIRYAM MIRYAM MIRYAM MIRYAM MIRYAM                   Neuro Re-Ed        Scap squeeze  30x 30x 30x 30x   AROM- flexion to 90 deg 2x10 2x10  2x10 mirror 2x10   AROM- scaption to 90 deg 2x10 2x10   2x10   AROM- abduction to 90 deg  2x10      Bent over row/scap retraction 2x10 2x10  2x10  2x10   RTC Isometrics 10x3\" ea 10x3\" ea    10x3\" ea   Serratus punch supine 2x10 2x10   2x10                    There Ex         Elbow PROM        Supination/pronation PROM         Pendulum        Table slides flexion and scaption 10x5\"  10x5\"  10x5\"  10x5\"  10x5\"    Wall crawls flexion 10x5\"  10x5\"  10x5\" 10x5\"  10x5\"    AAROM cane flexion, ER 10x5\"  10x5\"  10x5\" 10x5\"                       Ther Act        Towel squeeze                                                   "         Education

## 2024-11-05 ENCOUNTER — EVALUATION (OUTPATIENT)
Dept: PHYSICAL THERAPY | Facility: CLINIC | Age: 59
End: 2024-11-05
Payer: COMMERCIAL

## 2024-11-05 DIAGNOSIS — S46.812D TRAUMATIC RUPTURE OF SUPRASPINATUS TENDON OF LEFT SHOULDER, SUBSEQUENT ENCOUNTER: ICD-10-CM

## 2024-11-05 DIAGNOSIS — S46.812D TEAR OF LEFT INFRASPINATUS TENDON, SUBSEQUENT ENCOUNTER: Primary | ICD-10-CM

## 2024-11-05 PROCEDURE — 97112 NEUROMUSCULAR REEDUCATION: CPT | Performed by: PHYSICAL THERAPIST

## 2024-11-05 PROCEDURE — 97140 MANUAL THERAPY 1/> REGIONS: CPT | Performed by: PHYSICAL THERAPIST

## 2024-11-05 PROCEDURE — 97530 THERAPEUTIC ACTIVITIES: CPT | Performed by: PHYSICAL THERAPIST

## 2024-11-05 PROCEDURE — 97110 THERAPEUTIC EXERCISES: CPT | Performed by: PHYSICAL THERAPIST

## 2024-11-05 NOTE — PROGRESS NOTES
PT Re-Eval    Today's date: 2024  Patient name: Jonathan Fitzgerald  : 1965  MRN: [id number]  Referring provider: Bassem Finn*  Dx:   Encounter Diagnosis     ICD-10-CM    1. Tear of left infraspinatus tendon, subsequent encounter  S46.812D       2. Traumatic rupture of supraspinatus tendon of left shoulder, subsequent encounter  S46.812D                      Subjective: Pt reports feeling good and happy with progress thus far. Has been adhering to protocol.     Worst pain 0/10    Goals  Short Term Goals: to be achieved by 4 weeks  1) Patient to be independent with basic HEP. -MET  2) Decrease pain to 2/10 at its worst. -MET  3) Increase shoulder ROM by 5-10 degrees in all planes -MET  4) Increase shoulder strength by 1/2 MMT grade in all deficient planes. -MET    Long Term Goals: to be achieved by discharge  1) FOTO equal to or greater than expected.  2) Patient to be independent with comprehensive HEP.  3) Patient will demonstrate maximal over head reaching  4) Increase UE strength to 5/5 MMT grade in all planes to improve a/iadls.  5) Patient to report no sleep interruption secondary to pain.    Objective:       Observations     Additional Observation Details  Incision healing well. No signs of infection.     Active Range of Motion     Right Shoulder   Flexion: 165 degrees   Abduction: 155 degrees   External rotation 90°: 90 degrees  Internal rotation 90°: 70 degrees     Left Shoulder   Flexion: 150 degrees   Abduction: 150 degrees   External rotation 0°: 50 degrees   External rotation behind the head: T2    Cervical AROM WFL   Elbow ROM WFL   Wrist ROM WFL     Passive Range of Motion   Left Shoulder   Flexion: 90 degrees   Abduction: Left shoulder passive abduction: 20.   External rotation 0°: 10 degrees   Internal rotation 0°: Left shoulder passive internal rotation at 0 degrees: body.     Right Shoulder   Normal passive range of motion    Strength/Myotome Testing     Right Shoulder  "    Planes of Motion   Flexion: 5   Abduction: 5   External rotation at 0°: 5   Internal rotation at 0°: 5     Additional Strength Details  L NT due to protocol          Assessment: Patient is 10 weeks s/p RTC repair and doing great. He is currently in phase 2 of Dr. Finn's protocol. ROM is ~80% on average (objective measures above). Strength not tested today due to protocol but is at least 3/5 based on observation and AROM. Skilled PT recommended to continue progressing patient.       Plan: Continue per plan of care.      Precautions: see attached; DOS 8/28      Medbridge: [email or web address]   Re-Eval: 11/5-12/10   Authorization: $259 flat rate then $58 flat when deductible is hit   1:1 with PT: [phone]   Visit Count 11 12 13  10   Date:  10/22 10/31 11/5  10/17   Manuals        PROM within protocol range  MIRYAM MIRYAM MIRYAM  MIRYAM                   Neuro Re-Ed        Scap squeeze  30x   30x   AROM- flexion to 90 deg 2x10 2x10 2x10  2x10   AROM- scaption to 90 deg 2x10 2x10 2x10  2x10   AROM- abduction to 90 deg  2x10 2x10     Bent over row/scap retraction 2x10 2x10 2x10   2x10   RTC Isometrics 10x3\" ea 10x3\" ea 10x3\"   10x3\" ea   Serratus punch supine 2x10 2x10 2x10  2x10                    There Ex         Elbow PROM        Supination/pronation PROM         Pendulum        Table slides flexion and scaption 10x5\"  10x5\"  10x5\"   10x5\"    Wall crawls flexion 10x5\"  10x5\"  10x5\"   10x5\"    AAROM cane flexion, ER 10x5\"  10x5\"  10x5\"                        Ther Act        Towel squeeze                                                           Education                                                            "

## 2024-11-12 ENCOUNTER — APPOINTMENT (OUTPATIENT)
Dept: PHYSICAL THERAPY | Facility: CLINIC | Age: 59
End: 2024-11-12
Payer: COMMERCIAL

## 2024-11-19 ENCOUNTER — OFFICE VISIT (OUTPATIENT)
Dept: PHYSICAL THERAPY | Facility: CLINIC | Age: 59
End: 2024-11-19
Payer: COMMERCIAL

## 2024-11-19 DIAGNOSIS — S46.812D TRAUMATIC RUPTURE OF SUPRASPINATUS TENDON OF LEFT SHOULDER, SUBSEQUENT ENCOUNTER: ICD-10-CM

## 2024-11-19 DIAGNOSIS — S46.812D TEAR OF LEFT INFRASPINATUS TENDON, SUBSEQUENT ENCOUNTER: Primary | ICD-10-CM

## 2024-11-19 PROCEDURE — 97112 NEUROMUSCULAR REEDUCATION: CPT | Performed by: PHYSICAL THERAPIST

## 2024-11-19 PROCEDURE — 97140 MANUAL THERAPY 1/> REGIONS: CPT | Performed by: PHYSICAL THERAPIST

## 2024-11-19 PROCEDURE — 97110 THERAPEUTIC EXERCISES: CPT | Performed by: PHYSICAL THERAPIST

## 2024-11-19 PROCEDURE — 97530 THERAPEUTIC ACTIVITIES: CPT | Performed by: PHYSICAL THERAPIST

## 2024-11-19 NOTE — PROGRESS NOTES
"Daily Note     Today's date: 2024  Patient name: Jonathan Fitzgerald  : 1965  MRN: [id number]  Referring provider: Bassem Finn*  Dx:   Encounter Diagnosis     ICD-10-CM    1. Tear of left infraspinatus tendon, subsequent encounter  S46.812D       2. Traumatic rupture of supraspinatus tendon of left shoulder, subsequent encounter  S46.812D                      Subjective: Pt is doing well. Currently 12 weeks post-op.       Objective: See treatment diary below      Assessment: Pt currently 12 weeks and is doing well. Progressed per protocol to include theraband IR/ER. Patient fatigued but no pain. Continued to educate pt on protocol restrictions.       Plan: Continue per plan of care.      Precautions: see attached; DOS       [employer]: [email or web address]   Re-Eval: -12/10   Authorization: $259 flat rate then $58 flat when deductible is hit   1:1 with PT: [phone]   Visit Count 11 12 13 14 15   Date:  10/22 10/31 11/5 11/19    Manuals        PROM within protocol range  MIRYAM MIRYAM MIRYAM MIRYAM                    Neuro Re-Ed        Scap squeeze  30x  30x    AROM- flexion to 90 deg 2x10 2x10 2x10     AROM- scaption to 90 deg 2x10 2x10 2x10     AROM- abduction to 90 deg  2x10 2x10     Bent over row/scap retraction 2x10 2x10 2x10  2x10    RTC Isometrics 10x3\" ea 10x3\" ea 10x3\" 10x3\"    Serratus punch supine 2x10 2x10 2x10 3x10    TB ER    YTB 3x10    TB IR     YTB 3x10    TB Row    BTB 3x10     TB pull down    GTB 3x10             There Ex         Elbow PROM        Supination/pronation PROM         Pendulum        Table slides flexion and scaption 10x5\"  10x5\"  10x5\"  10x5\"     Wall crawls flexion 10x5\"  10x5\"  10x5\"  10x5\"     AAROM cane flexion, ER 10x5\"  10x5\"  10x5\"  10x5\"                       Ther Act        Towel squeeze                                                          Education                                                             "

## 2024-11-26 ENCOUNTER — OFFICE VISIT (OUTPATIENT)
Dept: PHYSICAL THERAPY | Facility: CLINIC | Age: 59
End: 2024-11-26
Payer: COMMERCIAL

## 2024-11-26 DIAGNOSIS — S46.812D TEAR OF LEFT INFRASPINATUS TENDON, SUBSEQUENT ENCOUNTER: ICD-10-CM

## 2024-11-26 DIAGNOSIS — S46.812D TRAUMATIC RUPTURE OF SUPRASPINATUS TENDON OF LEFT SHOULDER, SUBSEQUENT ENCOUNTER: Primary | ICD-10-CM

## 2024-11-26 PROCEDURE — 97110 THERAPEUTIC EXERCISES: CPT | Performed by: PHYSICAL THERAPIST

## 2024-11-26 PROCEDURE — 97140 MANUAL THERAPY 1/> REGIONS: CPT | Performed by: PHYSICAL THERAPIST

## 2024-11-26 PROCEDURE — 97112 NEUROMUSCULAR REEDUCATION: CPT | Performed by: PHYSICAL THERAPIST

## 2024-11-26 PROCEDURE — 97530 THERAPEUTIC ACTIVITIES: CPT | Performed by: PHYSICAL THERAPIST

## 2024-11-26 NOTE — PROGRESS NOTES
"Daily Note     Today's date: 2024  Patient name: Jonathan Fitzgerald  : 1965  MRN: [id number]  Referring provider: Bassem Finn*  Dx:   Encounter Diagnosis     ICD-10-CM    1. Traumatic rupture of supraspinatus tendon of left shoulder, subsequent encounter  S46.812D       2. Tear of left infraspinatus tendon, subsequent encounter  S46.812D                      Subjective: Pt reports feeling tired after last session but no pain       Objective: See treatment diary below      Assessment: Patient did well with treatment today. Significantly fatigued with TB exercises and standing shoulder flexion; cueing required to decrease compensatory strategies. Continued to educate patient on safety with what he can do.       Plan: Continue per plan of care.      Precautions: see attached; DOS       [employer]: [id number]   Re-Eval: -12/10   Authorization: $259 flat rate then $58 flat when deductible is hit   1:1 with PT: [phone]   Visit Count 11 12 13 14 15   Date:  10/22 10/31 11/5 11/19 11/26   Manuals        PROM within protocol range  MIRYAM MIRYAM MIRYAM MIRYAM                    Neuro Re-Ed        Scap squeeze  30x  30x    AROM- flexion to 90 deg 2x10 2x10 2x10  x10 1#   AROM- scaption to 90 deg 2x10 2x10 2x10  x10 1#   AROM- abduction to 90 deg  2x10 2x10  x10 0#   Bent over row/scap retraction 2x10 2x10 2x10  2x10 3x10 3#    RTC Isometrics 10x3\" ea 10x3\" ea 10x3\" 10x3\"    Serratus punch supine 2x10 2x10 2x10 3x10    TB ER    YTB 3x10 YTB 3x10   TB IR     YTB 3x10 RTB 3x10   TB Row    BTB 3x10  BTB 3x10    TB pull down    GTB 3x10  GTB 3x10    Sidelying ER     30x           There Ex         Elbow PROM        Supination/pronation PROM         Pendulum        Table slides flexion and scaption 10x5\"  10x5\"  10x5\"  10x5\"  10x5\"   Wall crawls flexion 10x5\"  10x5\"  10x5\"  10x5\"  10x5\"   AAROM cane flexion, ER 10x5\"  10x5\"  10x5\"  10x5\"                       Ther Act        Towel squeeze                              "                             Education

## 2024-12-05 ENCOUNTER — APPOINTMENT (OUTPATIENT)
Dept: PHYSICAL THERAPY | Facility: CLINIC | Age: 59
End: 2024-12-05
Payer: COMMERCIAL

## 2024-12-10 ENCOUNTER — OFFICE VISIT (OUTPATIENT)
Dept: PHYSICAL THERAPY | Facility: CLINIC | Age: 59
End: 2024-12-10
Payer: COMMERCIAL

## 2024-12-10 DIAGNOSIS — S46.812D TRAUMATIC RUPTURE OF SUPRASPINATUS TENDON OF LEFT SHOULDER, SUBSEQUENT ENCOUNTER: ICD-10-CM

## 2024-12-10 DIAGNOSIS — S46.812D TEAR OF LEFT INFRASPINATUS TENDON, SUBSEQUENT ENCOUNTER: Primary | ICD-10-CM

## 2024-12-10 PROCEDURE — 97140 MANUAL THERAPY 1/> REGIONS: CPT | Performed by: PHYSICAL THERAPIST

## 2024-12-10 PROCEDURE — 97530 THERAPEUTIC ACTIVITIES: CPT | Performed by: PHYSICAL THERAPIST

## 2024-12-10 PROCEDURE — 97112 NEUROMUSCULAR REEDUCATION: CPT | Performed by: PHYSICAL THERAPIST

## 2024-12-10 PROCEDURE — 97110 THERAPEUTIC EXERCISES: CPT | Performed by: PHYSICAL THERAPIST

## 2024-12-10 NOTE — PROGRESS NOTES
"Daily Note     Today's date: 12/10/2024  Patient name: Jonathan Fitzgerald  : 1965  MRN: [id number]  Referring provider: Bassem Finn*  Dx:   Encounter Diagnosis     ICD-10-CM    1. Tear of left infraspinatus tendon, subsequent encounter  S46.812D       2. Traumatic rupture of supraspinatus tendon of left shoulder, subsequent encounter  S46.812D                      Subjective: Pt reports no issues       Objective: See treatment diary below      Assessment: Tolerated treatment well today. Still lacking significant strength into abduction and ER. Encouraged patient to increase compliance with HEP by doing 1-2 exercises/day rather than trying to do all of them 1-2x/week. Some shifting noted during PROM but no discomfort. Focused on proper kinematics with elevation in front of the mirror today.       Plan: Continue per plan of care.      Precautions: see attached; DOS       [employer]: [email or web address]   Re-Eval: -12/10   Authorization: $259 flat rate then $58 flat when deductible is hit   1:1 with PT: [phone]   Visit Count 16  13 14 15   Date:  12/10  11/5 11/19 11/26   Manuals        PROM within protocol range  MIRYAM  MIRYAM MIRYAM                    Neuro Re-Ed        Scap squeeze    30x    AROM- flexion to 90 deg X10 mirror  2x10  x10 1#   AROM- scaption to 90 deg X10 mirror  2x10  x10 1#   AROM- abduction to 90 deg X10 mirror  2x10  x10 0#   Bent over row/scap retraction   2x10  2x10 3x10 3#    RTC Isometrics   10x3\" 10x3\"    Serratus punch supine   2x10 3x10    TB ER YTB 3x10    YTB 3x10 YTB 3x10   TB IR  GTB 3x10    YTB 3x10 RTB 3x10   TB Row GTB 3x10    BTB 3x10  BTB 3x10    TB pull down    GTB 3x10  GTB 3x10    Sidelying ER 30x     30x           There Ex         Elbow PROM        Supination/pronation PROM         Pendulum        Table slides flexion and scaption   10x5\"  10x5\"  10x5\"   Wall crawls flexion 10x5\"  10x5\"  10x5\"  10x5\"   AAROM cane flexion, ER   10x5\"  10x5\"                       Ther Act    "     Towel squeeze                                                          Education

## 2024-12-17 ENCOUNTER — APPOINTMENT (OUTPATIENT)
Dept: PHYSICAL THERAPY | Facility: CLINIC | Age: 59
End: 2024-12-17
Payer: COMMERCIAL

## 2025-02-21 ENCOUNTER — TELEPHONE (OUTPATIENT)
Dept: OBGYN CLINIC | Facility: HOSPITAL | Age: 60
End: 2025-02-21

## (undated) DEVICE — GLOVE INDICATOR PI UNDERGLOVE SZ 7.5 BLUE

## (undated) DEVICE — THREADED CLEAR CANNULA WITH OBTURATOR 8.5MM X 75MM

## (undated) DEVICE — INTENDED FOR TISSUE SEPARATION, AND OTHER PROCEDURES THAT REQUIRE A SHARP SURGICAL BLADE TO PUNCTURE OR CUT.: Brand: BARD-PARKER SAFETY BLADES SIZE 11, STERILE

## (undated) DEVICE — INTENDED FOR TISSUE SEPARATION, AND OTHER PROCEDURES THAT REQUIRE A SHARP SURGICAL BLADE TO PUNCTURE OR CUT.: Brand: BARD-PARKER SAFETY BLADES SIZE 15, STERILE

## (undated) DEVICE — TUBING SUCTION 5MM X 12 FT

## (undated) DEVICE — INTENDED FOR TISSUE SEPARATION, AND OTHER PROCEDURES THAT REQUIRE A SHARP SURGICAL BLADE TO PUNCTURE OR CUT.: Brand: BARD-PARKER ® CARBON RIB-BACK BLADES

## (undated) DEVICE — GLOVE SRG BIOGEL ORTHOPEDIC 8

## (undated) DEVICE — VIAL DECANTER

## (undated) DEVICE — ADHESIVE SKIN HIGH VISCOSITY EXOFIN 1ML

## (undated) DEVICE — SHOULDER SUSPENSION KIT 6 PER BOX

## (undated) DEVICE — PAD GROUNDING ADULT

## (undated) DEVICE — SUT VICRYL 0 UR-6 27 IN J603H

## (undated) DEVICE — DRESSING MEPILEX AG BORDER 4 X 4 IN

## (undated) DEVICE — DRAPE EQUIPMENT RF WAND

## (undated) DEVICE — EXPRESSEW III SUTURE NEEDLE FOR USE WITH EXPRESSEW II OR III SUTURE PASSER: Brand: EXPRESSEW

## (undated) DEVICE — CHLORAPREP HI-LITE 26ML ORANGE

## (undated) DEVICE — GLOVE SRG BIOGEL 7.5

## (undated) DEVICE — VAPR COOLPULSE 90 ELECTRODE 90 DEGREES SUCTION WITH INTEGRATED HANDPIECE: Brand: VAPR COOLPULSE

## (undated) DEVICE — SPONGE PVP SCRUB WING STERILE

## (undated) DEVICE — BETHLEHEM UNIVERSAL MINOR GEN: Brand: CARDINAL HEALTH

## (undated) DEVICE — LIGHT HANDLE COVER SLEEVE DISP BLUE STELLAR

## (undated) DEVICE — BLADE SHAVER DISSECTOR 4MM 13CM COOLCUT

## (undated) DEVICE — SUT MONOCRYL 4-0 PS-2 18 IN Y496G

## (undated) DEVICE — SKN PRP WNG SPNGE PVP SCRB STR: Brand: MEDLINE INDUSTRIES, INC.

## (undated) DEVICE — SUT MONOCRYL 4-0 PS-2 27 IN Y426H

## (undated) DEVICE — TOWEL SURG XR DETECT GREEN STRL RFD

## (undated) DEVICE — NEEDLE 22 G X 1 1/2 SAFETY

## (undated) DEVICE — THREADED CLEAR CANNULA WITH OBTURATOR 7MM X 75MM

## (undated) DEVICE — PACK PBDS SHOULDER ARTHROSCOPY RF

## (undated) DEVICE — GLOVE SRG BIOGEL ECLIPSE 7

## (undated) DEVICE — 3M™ IOBAN™ 2 ANTIMICROBIAL INCISE DRAPE 6650EZ: Brand: IOBAN™ 2

## (undated) DEVICE — PLUMEPEN PRO 10FT

## (undated) DEVICE — 3M™ STERI-STRIP™ REINFORCED ADHESIVE SKIN CLOSURES, R1547, 1/2 IN X 4 IN (12 MM X 100 MM), 6 STRIPS/ENVELOPE: Brand: 3M™ STERI-STRIP™

## (undated) DEVICE — PACK SHOULDER ARTHROSCOPY PBDS

## (undated) DEVICE — DISPOSABLE EQUIPMENT COVER: Brand: SMALL TOWEL DRAPE

## (undated) DEVICE — ADHESIVE SKN CLSR HISTOACRYL FLEX 0.5ML LF

## (undated) DEVICE — BLADE SHAVER DISSECTOR 3.5MM 13CM COOLCUT